# Patient Record
Sex: MALE | Race: WHITE | NOT HISPANIC OR LATINO | Employment: OTHER | URBAN - METROPOLITAN AREA
[De-identification: names, ages, dates, MRNs, and addresses within clinical notes are randomized per-mention and may not be internally consistent; named-entity substitution may affect disease eponyms.]

---

## 2020-11-23 ENCOUNTER — APPOINTMENT (OUTPATIENT)
Dept: GENERAL RADIOLOGY | Facility: HOSPITAL | Age: 60
End: 2020-11-23

## 2020-11-23 ENCOUNTER — APPOINTMENT (OUTPATIENT)
Dept: CT IMAGING | Facility: HOSPITAL | Age: 60
End: 2020-11-23

## 2020-11-23 ENCOUNTER — HOSPITAL ENCOUNTER (EMERGENCY)
Facility: HOSPITAL | Age: 60
Discharge: LEFT BEFORE DISCHARGE | End: 2020-11-23
Attending: EMERGENCY MEDICINE | Admitting: EMERGENCY MEDICINE

## 2020-11-23 VITALS
BODY MASS INDEX: 24.5 KG/M2 | HEIGHT: 71 IN | SYSTOLIC BLOOD PRESSURE: 175 MMHG | DIASTOLIC BLOOD PRESSURE: 100 MMHG | OXYGEN SATURATION: 93 % | RESPIRATION RATE: 16 BRPM | TEMPERATURE: 98 F | HEART RATE: 102 BPM | WEIGHT: 175 LBS

## 2020-11-23 DIAGNOSIS — F10.20 ALCOHOLISM (HCC): ICD-10-CM

## 2020-11-23 DIAGNOSIS — F10.920 ALCOHOLIC INTOXICATION WITHOUT COMPLICATION (HCC): Primary | ICD-10-CM

## 2020-11-23 DIAGNOSIS — R41.3 MEMORY CHANGES: ICD-10-CM

## 2020-11-23 LAB
ALBUMIN SERPL-MCNC: 4.2 G/DL (ref 3.5–5.2)
ALBUMIN/GLOB SERPL: 1.6 G/DL
ALP SERPL-CCNC: 115 U/L (ref 39–117)
ALT SERPL W P-5'-P-CCNC: 46 U/L (ref 1–41)
AMMONIA BLD-SCNC: 18 UMOL/L (ref 16–60)
AMPHET+METHAMPHET UR QL: NEGATIVE
AMPHETAMINES UR QL: NEGATIVE
ANION GAP SERPL CALCULATED.3IONS-SCNC: 17 MMOL/L (ref 5–15)
AST SERPL-CCNC: 80 U/L (ref 1–40)
BARBITURATES UR QL SCN: NEGATIVE
BASOPHILS # BLD AUTO: 0.06 10*3/MM3 (ref 0–0.2)
BASOPHILS NFR BLD AUTO: 0.7 % (ref 0–1.5)
BENZODIAZ UR QL SCN: NEGATIVE
BILIRUB SERPL-MCNC: 0.6 MG/DL (ref 0–1.2)
BILIRUB UR QL STRIP: NEGATIVE
BUN SERPL-MCNC: 5 MG/DL (ref 8–23)
BUN/CREAT SERPL: 8.6 (ref 7–25)
BUPRENORPHINE SERPL-MCNC: NEGATIVE NG/ML
CALCIUM SPEC-SCNC: 9.2 MG/DL (ref 8.6–10.5)
CANNABINOIDS SERPL QL: NEGATIVE
CHLORIDE SERPL-SCNC: 102 MMOL/L (ref 98–107)
CLARITY UR: CLEAR
CO2 SERPL-SCNC: 23 MMOL/L (ref 22–29)
COCAINE UR QL: NEGATIVE
COLOR UR: YELLOW
CREAT SERPL-MCNC: 0.58 MG/DL (ref 0.76–1.27)
D-LACTATE SERPL-SCNC: 2.7 MMOL/L (ref 0.5–2)
DEPRECATED RDW RBC AUTO: 47.8 FL (ref 37–54)
EOSINOPHIL # BLD AUTO: 0.13 10*3/MM3 (ref 0–0.4)
EOSINOPHIL NFR BLD AUTO: 1.5 % (ref 0.3–6.2)
ERYTHROCYTE [DISTWIDTH] IN BLOOD BY AUTOMATED COUNT: 12.4 % (ref 12.3–15.4)
ETHANOL BLD-MCNC: 283 MG/DL (ref 0–10)
GFR SERPL CREATININE-BSD FRML MDRD: 142 ML/MIN/1.73
GLOBULIN UR ELPH-MCNC: 2.7 GM/DL
GLUCOSE SERPL-MCNC: 90 MG/DL (ref 65–99)
GLUCOSE UR STRIP-MCNC: NEGATIVE MG/DL
HCT VFR BLD AUTO: 46.1 % (ref 37.5–51)
HGB BLD-MCNC: 16 G/DL (ref 13–17.7)
HGB UR QL STRIP.AUTO: NEGATIVE
HOLD SPECIMEN: NORMAL
HOLD SPECIMEN: NORMAL
IMM GRANULOCYTES # BLD AUTO: 0.03 10*3/MM3 (ref 0–0.05)
IMM GRANULOCYTES NFR BLD AUTO: 0.3 % (ref 0–0.5)
KETONES UR QL STRIP: NEGATIVE
LACTATE HOLD SPECIMEN: NORMAL
LEUKOCYTE ESTERASE UR QL STRIP.AUTO: NEGATIVE
LYMPHOCYTES # BLD AUTO: 2.02 10*3/MM3 (ref 0.7–3.1)
LYMPHOCYTES NFR BLD AUTO: 23.5 % (ref 19.6–45.3)
MAGNESIUM SERPL-MCNC: 2.1 MG/DL (ref 1.6–2.4)
MCH RBC QN AUTO: 35.9 PG (ref 26.6–33)
MCHC RBC AUTO-ENTMCNC: 34.7 G/DL (ref 31.5–35.7)
MCV RBC AUTO: 103.4 FL (ref 79–97)
METHADONE UR QL SCN: NEGATIVE
MONOCYTES # BLD AUTO: 1 10*3/MM3 (ref 0.1–0.9)
MONOCYTES NFR BLD AUTO: 11.7 % (ref 5–12)
NEUTROPHILS NFR BLD AUTO: 5.34 10*3/MM3 (ref 1.7–7)
NEUTROPHILS NFR BLD AUTO: 62.3 % (ref 42.7–76)
NITRITE UR QL STRIP: NEGATIVE
NRBC BLD AUTO-RTO: 0 /100 WBC (ref 0–0.2)
OPIATES UR QL: NEGATIVE
OXYCODONE UR QL SCN: NEGATIVE
PCP UR QL SCN: NEGATIVE
PH UR STRIP.AUTO: 6.5 [PH] (ref 5–8)
PLATELET # BLD AUTO: 193 10*3/MM3 (ref 140–450)
PMV BLD AUTO: 9.4 FL (ref 6–12)
POTASSIUM SERPL-SCNC: 3.9 MMOL/L (ref 3.5–5.2)
PROCALCITONIN SERPL-MCNC: 0.06 NG/ML (ref 0–0.25)
PROPOXYPH UR QL: NEGATIVE
PROT SERPL-MCNC: 6.9 G/DL (ref 6–8.5)
PROT UR QL STRIP: ABNORMAL
RBC # BLD AUTO: 4.46 10*6/MM3 (ref 4.14–5.8)
SODIUM SERPL-SCNC: 142 MMOL/L (ref 136–145)
SP GR UR STRIP: 1.01 (ref 1–1.03)
TRICYCLICS UR QL SCN: NEGATIVE
TROPONIN T SERPL-MCNC: <0.01 NG/ML (ref 0–0.03)
UROBILINOGEN UR QL STRIP: ABNORMAL
WBC # BLD AUTO: 8.58 10*3/MM3 (ref 3.4–10.8)
WHOLE BLOOD HOLD SPECIMEN: NORMAL
WHOLE BLOOD HOLD SPECIMEN: NORMAL

## 2020-11-23 PROCEDURE — 83735 ASSAY OF MAGNESIUM: CPT | Performed by: EMERGENCY MEDICINE

## 2020-11-23 PROCEDURE — 71045 X-RAY EXAM CHEST 1 VIEW: CPT

## 2020-11-23 PROCEDURE — 84145 PROCALCITONIN (PCT): CPT | Performed by: EMERGENCY MEDICINE

## 2020-11-23 PROCEDURE — 80307 DRUG TEST PRSMV CHEM ANLYZR: CPT | Performed by: EMERGENCY MEDICINE

## 2020-11-23 PROCEDURE — 84484 ASSAY OF TROPONIN QUANT: CPT | Performed by: EMERGENCY MEDICINE

## 2020-11-23 PROCEDURE — 70450 CT HEAD/BRAIN W/O DYE: CPT

## 2020-11-23 PROCEDURE — 85025 COMPLETE CBC W/AUTO DIFF WBC: CPT | Performed by: EMERGENCY MEDICINE

## 2020-11-23 PROCEDURE — 99284 EMERGENCY DEPT VISIT MOD MDM: CPT

## 2020-11-23 PROCEDURE — 80053 COMPREHEN METABOLIC PANEL: CPT | Performed by: EMERGENCY MEDICINE

## 2020-11-23 PROCEDURE — 82140 ASSAY OF AMMONIA: CPT | Performed by: EMERGENCY MEDICINE

## 2020-11-23 PROCEDURE — 93005 ELECTROCARDIOGRAM TRACING: CPT | Performed by: EMERGENCY MEDICINE

## 2020-11-23 PROCEDURE — 81003 URINALYSIS AUTO W/O SCOPE: CPT | Performed by: EMERGENCY MEDICINE

## 2020-11-23 PROCEDURE — 83605 ASSAY OF LACTIC ACID: CPT | Performed by: EMERGENCY MEDICINE

## 2020-11-23 PROCEDURE — 99283 EMERGENCY DEPT VISIT LOW MDM: CPT

## 2020-11-23 RX ORDER — SODIUM CHLORIDE 0.9 % (FLUSH) 0.9 %
10 SYRINGE (ML) INJECTION AS NEEDED
Status: DISCONTINUED | OUTPATIENT
Start: 2020-11-23 | End: 2020-11-23 | Stop reason: HOSPADM

## 2020-11-26 LAB
QT INTERVAL: 332 MS
QTC INTERVAL: 438 MS

## 2023-01-17 ENCOUNTER — OFFICE VISIT (OUTPATIENT)
Dept: FAMILY MEDICINE CLINIC | Facility: CLINIC | Age: 63
End: 2023-01-17
Payer: MEDICAID

## 2023-01-17 ENCOUNTER — LAB (OUTPATIENT)
Dept: LAB | Facility: HOSPITAL | Age: 63
End: 2023-01-17
Payer: MEDICAID

## 2023-01-17 VITALS
DIASTOLIC BLOOD PRESSURE: 90 MMHG | OXYGEN SATURATION: 96 % | WEIGHT: 173 LBS | HEART RATE: 105 BPM | BODY MASS INDEX: 24.22 KG/M2 | SYSTOLIC BLOOD PRESSURE: 146 MMHG | RESPIRATION RATE: 21 BRPM | HEIGHT: 71 IN | TEMPERATURE: 98.6 F

## 2023-01-17 DIAGNOSIS — Z13.1 SCREENING FOR DIABETES MELLITUS: ICD-10-CM

## 2023-01-17 DIAGNOSIS — R20.0 NUMBNESS AND TINGLING IN RIGHT HAND: Primary | ICD-10-CM

## 2023-01-17 DIAGNOSIS — R20.2 NUMBNESS AND TINGLING IN RIGHT HAND: Primary | ICD-10-CM

## 2023-01-17 DIAGNOSIS — Z12.5 SCREENING PSA (PROSTATE SPECIFIC ANTIGEN): ICD-10-CM

## 2023-01-17 DIAGNOSIS — Z13.0 SCREENING FOR DEFICIENCY ANEMIA: ICD-10-CM

## 2023-01-17 DIAGNOSIS — Z13.220 SCREENING, LIPID: ICD-10-CM

## 2023-01-17 DIAGNOSIS — Z13.21 ENCOUNTER FOR VITAMIN DEFICIENCY SCREENING: ICD-10-CM

## 2023-01-17 DIAGNOSIS — Z13.29 SCREENING FOR THYROID DISORDER: ICD-10-CM

## 2023-01-17 DIAGNOSIS — Z78.9 ALCOHOL USE: ICD-10-CM

## 2023-01-17 DIAGNOSIS — J44.9 COPD WITHOUT EXACERBATION: ICD-10-CM

## 2023-01-17 DIAGNOSIS — Z00.00 ENCOUNTER FOR MEDICAL EXAMINATION TO ESTABLISH CARE: ICD-10-CM

## 2023-01-17 DIAGNOSIS — I10 HTN, GOAL BELOW 140/80: ICD-10-CM

## 2023-01-17 DIAGNOSIS — M71.331 OTHER BURSAL CYST OF RIGHT WRIST: ICD-10-CM

## 2023-01-17 LAB
25(OH)D3 SERPL-MCNC: 46.2 NG/ML (ref 30–100)
ALBUMIN SERPL-MCNC: 4.6 G/DL (ref 3.5–5.2)
ALBUMIN/GLOB SERPL: 1.5 G/DL
ALP SERPL-CCNC: 97 U/L (ref 39–117)
ALT SERPL W P-5'-P-CCNC: 17 U/L (ref 1–41)
ANION GAP SERPL CALCULATED.3IONS-SCNC: 14.9 MMOL/L (ref 5–15)
AST SERPL-CCNC: 35 U/L (ref 1–40)
BACTERIA UR QL AUTO: ABNORMAL /HPF
BASOPHILS # BLD AUTO: 0.07 10*3/MM3 (ref 0–0.2)
BASOPHILS NFR BLD AUTO: 0.7 % (ref 0–1.5)
BILIRUB SERPL-MCNC: 0.8 MG/DL (ref 0–1.2)
BILIRUB UR QL STRIP: NEGATIVE
BUN SERPL-MCNC: 6 MG/DL (ref 8–23)
BUN/CREAT SERPL: 7.6 (ref 7–25)
CALCIUM SPEC-SCNC: 10.2 MG/DL (ref 8.6–10.5)
CHLORIDE SERPL-SCNC: 99 MMOL/L (ref 98–107)
CHOLEST SERPL-MCNC: 247 MG/DL (ref 0–200)
CLARITY UR: CLEAR
CO2 SERPL-SCNC: 25.1 MMOL/L (ref 22–29)
COLOR UR: ABNORMAL
CREAT SERPL-MCNC: 0.79 MG/DL (ref 0.76–1.27)
DEPRECATED RDW RBC AUTO: 45.2 FL (ref 37–54)
EGFRCR SERPLBLD CKD-EPI 2021: 99.8 ML/MIN/1.73
EOSINOPHIL # BLD AUTO: 0.1 10*3/MM3 (ref 0–0.4)
EOSINOPHIL NFR BLD AUTO: 1 % (ref 0.3–6.2)
ERYTHROCYTE [DISTWIDTH] IN BLOOD BY AUTOMATED COUNT: 12 % (ref 12.3–15.4)
FOLATE SERPL-MCNC: 14.2 NG/ML (ref 4.78–24.2)
GLOBULIN UR ELPH-MCNC: 3.1 GM/DL
GLUCOSE SERPL-MCNC: 95 MG/DL (ref 65–99)
GLUCOSE UR STRIP-MCNC: NEGATIVE MG/DL
HCT VFR BLD AUTO: 47.4 % (ref 37.5–51)
HDLC SERPL-MCNC: 68 MG/DL (ref 40–60)
HGB BLD-MCNC: 16.6 G/DL (ref 13–17.7)
HGB UR QL STRIP.AUTO: NEGATIVE
HYALINE CASTS UR QL AUTO: ABNORMAL /LPF
IMM GRANULOCYTES # BLD AUTO: 0.04 10*3/MM3 (ref 0–0.05)
IMM GRANULOCYTES NFR BLD AUTO: 0.4 % (ref 0–0.5)
KETONES UR QL STRIP: ABNORMAL
LDLC SERPL CALC-MCNC: 165 MG/DL (ref 0–100)
LDLC/HDLC SERPL: 2.39 {RATIO}
LEUKOCYTE ESTERASE UR QL STRIP.AUTO: ABNORMAL
LYMPHOCYTES # BLD AUTO: 2.07 10*3/MM3 (ref 0.7–3.1)
LYMPHOCYTES NFR BLD AUTO: 19.9 % (ref 19.6–45.3)
MCH RBC QN AUTO: 35.7 PG (ref 26.6–33)
MCHC RBC AUTO-ENTMCNC: 35 G/DL (ref 31.5–35.7)
MCV RBC AUTO: 101.9 FL (ref 79–97)
MONOCYTES # BLD AUTO: 1.25 10*3/MM3 (ref 0.1–0.9)
MONOCYTES NFR BLD AUTO: 12 % (ref 5–12)
MUCOUS THREADS URNS QL MICRO: ABNORMAL /HPF
NEUTROPHILS NFR BLD AUTO: 6.85 10*3/MM3 (ref 1.7–7)
NEUTROPHILS NFR BLD AUTO: 66 % (ref 42.7–76)
NITRITE UR QL STRIP: NEGATIVE
NRBC BLD AUTO-RTO: 0 /100 WBC (ref 0–0.2)
PH UR STRIP.AUTO: 7 [PH] (ref 5–8)
PLATELET # BLD AUTO: 343 10*3/MM3 (ref 140–450)
PMV BLD AUTO: 10.3 FL (ref 6–12)
POTASSIUM SERPL-SCNC: 4 MMOL/L (ref 3.5–5.2)
PROT SERPL-MCNC: 7.7 G/DL (ref 6–8.5)
PROT UR QL STRIP: ABNORMAL
PSA SERPL-MCNC: 0.95 NG/ML (ref 0–4)
RBC # BLD AUTO: 4.65 10*6/MM3 (ref 4.14–5.8)
RBC # UR STRIP: ABNORMAL /HPF
REF LAB TEST METHOD: ABNORMAL
SODIUM SERPL-SCNC: 139 MMOL/L (ref 136–145)
SP GR UR STRIP: 1.02 (ref 1–1.03)
SQUAMOUS #/AREA URNS HPF: ABNORMAL /HPF
TRIGL SERPL-MCNC: 83 MG/DL (ref 0–150)
TSH SERPL DL<=0.05 MIU/L-ACNC: 0.54 UIU/ML (ref 0.27–4.2)
UROBILINOGEN UR QL STRIP: ABNORMAL
VIT B12 BLD-MCNC: 666 PG/ML (ref 211–946)
VLDLC SERPL-MCNC: 14 MG/DL (ref 5–40)
WBC # UR STRIP: ABNORMAL /HPF
WBC NRBC COR # BLD: 10.38 10*3/MM3 (ref 3.4–10.8)

## 2023-01-17 PROCEDURE — 80061 LIPID PANEL: CPT

## 2023-01-17 PROCEDURE — 81001 URINALYSIS AUTO W/SCOPE: CPT

## 2023-01-17 PROCEDURE — 80053 COMPREHEN METABOLIC PANEL: CPT

## 2023-01-17 PROCEDURE — 83036 HEMOGLOBIN GLYCOSYLATED A1C: CPT

## 2023-01-17 PROCEDURE — 84443 ASSAY THYROID STIM HORMONE: CPT | Performed by: NURSE PRACTITIONER

## 2023-01-17 PROCEDURE — 85025 COMPLETE CBC W/AUTO DIFF WBC: CPT

## 2023-01-17 PROCEDURE — 36415 COLL VENOUS BLD VENIPUNCTURE: CPT

## 2023-01-17 PROCEDURE — 82306 VITAMIN D 25 HYDROXY: CPT

## 2023-01-17 PROCEDURE — 82607 VITAMIN B-12: CPT

## 2023-01-17 PROCEDURE — 99203 OFFICE O/P NEW LOW 30 MIN: CPT | Performed by: NURSE PRACTITIONER

## 2023-01-17 PROCEDURE — 82746 ASSAY OF FOLIC ACID SERUM: CPT

## 2023-01-17 PROCEDURE — G0103 PSA SCREENING: HCPCS

## 2023-01-17 RX ORDER — ALBUTEROL SULFATE 90 UG/1
2 AEROSOL, METERED RESPIRATORY (INHALATION) EVERY 4 HOURS PRN
Qty: 18 G | Refills: 5 | Status: SHIPPED | OUTPATIENT
Start: 2023-01-17

## 2023-01-17 RX ORDER — MULTIPLE VITAMINS W/ MINERALS TAB 9MG-400MCG
1 TAB ORAL DAILY
COMMUNITY

## 2023-01-17 RX ORDER — FLUTICASONE PROPIONATE AND SALMETEROL 250; 50 UG/1; UG/1
1 POWDER RESPIRATORY (INHALATION)
Qty: 1 EACH | Refills: 11 | Status: SHIPPED | OUTPATIENT
Start: 2023-01-17

## 2023-01-17 RX ORDER — LISINOPRIL 10 MG/1
10 TABLET ORAL DAILY
Qty: 90 TABLET | Refills: 1 | Status: SHIPPED | OUTPATIENT
Start: 2023-01-17

## 2023-01-17 NOTE — PROGRESS NOTES
"Chief Complaint  Establish Care and Hand Pain (Pt has been having right hand pain and numbness.  Pt had carpal tunnel surgery on left hand. )    Subjective          Oscar Avila presents to [facility]  History of Present Illness  Patient is a 63 yo male. He is here to establish care with a new provider. He has seen PCP at [facility] and does not recall the name. They no longer take his insurance.  He is retired on social security.   He states a history of COPD, alcohol use, and hand pain right hand pain and numbness and tingling that started several months ago seems to be worsening.  He does have a history of carpal tunnel surgery on his left hand some years ago.      The following portions of the patient's history were reviewed and updated as appropriate: allergies, current medications, past family history, past medical history, past social history, past surgical history and problem list.    Review of Systems   Constitutional: Negative.    Respiratory: Positive for cough and chest tightness.    Cardiovascular: Negative.    Gastrointestinal: Negative.    Genitourinary: Negative.    Musculoskeletal: Negative.    Skin: Negative.    Neurological: Positive for numbness.   Hematological: Negative.    Psychiatric/Behavioral: Negative for suicidal ideas. The patient is nervous/anxious.          Objective   Vital Signs:   /90   Pulse 105   Temp 98.6 °F (37 °C) (Temporal)   Resp 21   Ht 180.3 cm (70.98\")   Wt 78.5 kg (173 lb)   SpO2 96%   BMI 24.14 kg/m²    BMI is within normal parameters. No other follow-up for BMI required.      PHQ-2/9 Depression Screening  PHQ-9 Total Score: 0    SAMUEL-7 Anxiety Screening  SAMUEL-7  Feeling nervous, anxious or on edge: Not at all  Not being able to stop or control worrying: Several days  Worrying too much about different things: Several days  Trouble Relaxing: Not at all  Being so restless that it is hard to sit still: Not at " all  Feeling afraid as if something awful might happen: Not at all  Becoming easily annoyed or irritable: Several days  SAMUEL 7 Total Score: 3  If you checked any problems, how difficult have these problems made it for you to do your work, take care of things at home, or get along with other people: Somewhat difficult          Physical Exam  Vitals ( blood pressure is 146/90 and his pulse rate is 105 bpm   today is his first day with this provider.  He is not currently on any blood pressure medication. he does state a hx of HTN untreated.) reviewed.   Constitutional:       Appearance: He is normal weight.   HENT:      Head: Normocephalic.      Right Ear: Tympanic membrane normal.      Mouth/Throat:      Mouth: Mucous membranes are moist.   Eyes:      Pupils: Pupils are equal, round, and reactive to light.   Cardiovascular:      Rate and Rhythm: Regular rhythm. Tachycardia present.      Pulses: Normal pulses.   Pulmonary:      Effort: Pulmonary effort is normal.      Breath sounds: Normal breath sounds.   Abdominal:      Palpations: Abdomen is soft.   Musculoskeletal:         General: No swelling, deformity or signs of injury.      Right hand: Tenderness present. No swelling, deformity, lacerations or bony tenderness. Normal range of motion. Normal strength. Normal sensation. There is no disruption of two-point discrimination. Normal capillary refill. Normal pulse.        Arms:       Right lower leg: No edema.      Left lower leg: No edema.      Comments: There is a very small cyst type lesion on his ulnar side of his wrist. This may be the cause of his tingling/ numbness.      Skin:     General: Skin is warm and dry.      Capillary Refill: Capillary refill takes less than 2 seconds.   Neurological:      Mental Status: He is alert and oriented to person, place, and time.   Psychiatric:         Mood and Affect: Mood normal.        Result Review :                 Assessment and Plan    Diagnoses and all orders for this  visit:    1. Numbness and tingling in right hand (Primary)  -     Ambulatory Referral to Orthopedic Surgery    2. Encounter for medical examination to establish care  -     CBC & Differential; Future  -     Lipid Panel; Future  -     Hemoglobin A1c; Future  -     Comprehensive Metabolic Panel; Future  -     Urinalysis With Culture If Indicated - Urine, Clean Catch; Future    3. Screening, lipid  -     Lipid Panel; Future    4. Screening for diabetes mellitus  -     Hemoglobin A1c; Future    5. Screening for deficiency anemia  -     CBC & Differential; Future    6. Screening for thyroid disorder  -     TSH Rfx On Abnormal To Free T4    7. Screening PSA (prostate specific antigen)  -     PSA Screen; Future    8. Encounter for vitamin deficiency screening  -     Vitamin D,25-Hydroxy; Future  -     Vitamin B12; Future  -     Folate; Future    9. Alcohol use  -     Vitamin B12; Future  -     Folate; Future    10. HTN, goal below 140/80  -     lisinopril (PRINIVIL,ZESTRIL) 10 MG tablet; Take 1 tablet by mouth Daily.  Dispense: 90 tablet; Refill: 1    11. COPD without exacerbation (HCC)  -     albuterol sulfate  (90 Base) MCG/ACT inhaler; Inhale 2 puffs Every 4 (Four) Hours As Needed for Wheezing or Shortness of Air.  Dispense: 18 g; Refill: 5  -     Fluticasone-Salmeterol (ADVAIR/WIXELA) 250-50 MCG/ACT DISKUS; Inhale 1 puff 2 (Two) Times a Day.  Dispense: 1 each; Refill: 11    12. Other bursal cyst of right wrist    referral to see orthopedics for right hand numbness tingling and cyst.   Follow up for labs.   Refilling his albuterol, advair.  Starting on lisinopril for his HTN. He had several documented elevated pressures in his past.   Follow up in 4 weeks for HTN and annual exam.       Follow Up   Return needs appointment for physical in 4 weeks, for Annual.  Patient was given instructions and counseling regarding his condition or for health maintenance advice. Please see specific information pulled into the AVS  if appropriate.

## 2023-01-18 LAB — HBA1C MFR BLD: 5.4 % (ref 4.8–5.6)

## 2023-01-18 NOTE — PROGRESS NOTES
Please call patient regarding his urinalysis shows some protein and is dark.   Increase water intake will recheck at next visit. Otherwise, there are a few minor non concerning abnormalities.

## 2023-01-18 NOTE — PROGRESS NOTES
Let patient know his labs are normal.  His PSA/prostate level is normal, his liver function, kidney function, electrolytes are normal.  His folate, B12, and vitamin D are normal.     His cholesterol is elevated. Your cholesterol was elevated. You will need to avoid fat and fried foods.   Increase your intake of fiber. Increase your exercise to 30 minutes 3-4 times a week.

## 2023-01-19 ENCOUNTER — OFFICE VISIT (OUTPATIENT)
Dept: ORTHOPEDIC SURGERY | Facility: CLINIC | Age: 63
End: 2023-01-19
Payer: MEDICAID

## 2023-01-19 VITALS
SYSTOLIC BLOOD PRESSURE: 130 MMHG | HEIGHT: 71 IN | WEIGHT: 173.06 LBS | BODY MASS INDEX: 24.23 KG/M2 | DIASTOLIC BLOOD PRESSURE: 82 MMHG

## 2023-01-19 DIAGNOSIS — G56.01 CARPAL TUNNEL SYNDROME ON RIGHT: ICD-10-CM

## 2023-01-19 DIAGNOSIS — R20.2 NUMBNESS AND TINGLING IN RIGHT HAND: Primary | ICD-10-CM

## 2023-01-19 DIAGNOSIS — M79.641 RIGHT HAND PAIN: ICD-10-CM

## 2023-01-19 DIAGNOSIS — R20.0 NUMBNESS AND TINGLING IN RIGHT HAND: Primary | ICD-10-CM

## 2023-01-19 PROCEDURE — 73130 X-RAY EXAM OF HAND: CPT | Performed by: ORTHOPAEDIC SURGERY

## 2023-01-19 PROCEDURE — 99203 OFFICE O/P NEW LOW 30 MIN: CPT

## 2023-01-19 NOTE — PROGRESS NOTES
[facility] [place] Office Visit - Najma Mojica PA-C    Office Visit       Patient Name: Oscar Avila    Chief Complaint:   Chief Complaint   Patient presents with   • Right Hand - Pain       Referring Physician: Aicha Maldonado APRN    History of Present Illness:   Oscar Avila is a 62 y.o. male who presents with numbness and tingling in his right hand and fingers.  He reports for the last several years he has had numbness in his fingers.  It has progressively worsened recently.  Associated with poor  strength.  He says he often drops objects and cups due to poor sensation.  Associated with mild pain.   Described as dull and aching.  Treatments tried include heat and activity modification. He is right-hand dominant.  History of left carpal tunnel release many years ago at [facility].  No history of diabetes.  Normal vitamin B12 and folate levels checked by PCP.      Subjective     Review of Systems   Constitutional: Negative.  Negative for chills, fatigue and fever.   HENT: Negative.  Negative for congestion and dental problem.    Eyes: Negative.  Negative for blurred vision.   Respiratory: Negative.  Negative for shortness of breath.    Cardiovascular: Negative.  Negative for leg swelling.   Gastrointestinal: Negative.  Negative for abdominal pain.   Endocrine: Negative.  Negative for polyuria.   Genitourinary: Negative.  Negative for difficulty urinating.   Musculoskeletal: Positive for arthralgias.   Skin: Negative.    Allergic/Immunologic: Negative.    Neurological: Negative.    Hematological: Negative.  Negative for adenopathy.   Psychiatric/Behavioral: Negative.  Negative for behavioral problems.        Past Medical History:   Past Medical History:   Diagnosis Date   • Hypertension    • Neuromuscular disorder (HCC)        Past Surgical History:   Past Surgical History:   Procedure Laterality Date   • CARPAL TUNNEL  "RELEASE Left    • FOOT FRACTURE SURGERY Right 01/01/2018    fracture to right foot - has a metal pin on great toe       Family History:   Family History   Problem Relation Age of Onset   • Cancer Mother         small cell cancer   • Cancer Father         colon cancer       Social History:   Social History     Socioeconomic History   • Marital status:    Tobacco Use   • Smoking status: Every Day     Packs/day: 1.50     Years: 45.00     Pack years: 67.50     Types: Cigarettes     Start date: 1976   • Smokeless tobacco: Never   Vaping Use   • Vaping Use: Never used   Substance and Sexual Activity   • Alcohol use: Yes     Comment: drinks vodka - 4 pints per week.   • Drug use: Yes     Comment: alcohol   • Sexual activity: Yes     Partners: Female       Medications:   Current Outpatient Medications:   •  albuterol sulfate  (90 Base) MCG/ACT inhaler, Inhale 2 puffs Every 4 (Four) Hours As Needed for Wheezing or Shortness of Air., Disp: 18 g, Rfl: 5  •  Fluticasone-Salmeterol (ADVAIR/WIXELA) 250-50 MCG/ACT DISKUS, Inhale 1 puff 2 (Two) Times a Day., Disp: 1 each, Rfl: 11  •  lisinopril (PRINIVIL,ZESTRIL) 10 MG tablet, Take 1 tablet by mouth Daily., Disp: 90 tablet, Rfl: 1  •  multivitamin with minerals tablet tablet, Take 1 tablet by mouth Daily., Disp: , Rfl:     Allergies:   Allergies   Allergen Reactions   • Penicillins GI Intolerance     \" I VOMIT\"        I have reviewed and updated the following portions of the patient's history and review of systems: allergies, current medications, past family history, past medical history, past social history, past surgical history and problem list.    Objective      Vital Signs:   Vitals:    01/19/23 0755   BP: 130/82   Weight: 78.5 kg (173 lb 1 oz)   Height: 180.3 cm (70.98\")       Ortho Exam:  HAND AND WRIST EXAM    SIDE: Right    Peripheral Vascular   Bilateral Upper Extremity    No cyanotic nail beds    Pink nail beds and rapid capillary " refill   Palpation    Radial Pulse - Bilaterally normal    Neurologic   Sensory: Light touch intact, reports numbness of fingertips      Upper Extremity    Wrist extensors: 5/5    Wrist flexors: 5/5    Intrinsics: 5/5    Musculoskeletal      Elbow    Forearm supination: AROM - 90 degrees    Forearm pronation: AROM - 90 degrees     Inspection and Palpation   Wrist      Tenderness -none    Inspection- small volar cyst on wrist, <1 cm    Swelling - none    Crepitus - none    Muscle tone - no atrophy        ROJM:     Wrist    Flexion: AROM - 90 degrees    Extension: AROM - 90 degrees     Deformities, Malalignments, Discrepancies    None     Functional Testing   Wrist    Tinel's Sign--positive    Phalen's Sign--positive       Strength and Tone     strength: weakened     Results Review:   XR Hand 3+ View Right  Imaging: HAND x-rays 3 views     Side: RIGHT hand    Indication for images: hand pain, possible carpal tunnel syndrome    Comparison: no comparison views available    Findings:   Right hand baseline degenerative changes including basilar thumb arthritic   changes, no acute bony findings.    I personally reviewed the above x-rays         Assessment / Plan      Assessment:  Diagnoses and all orders for this visit:    1. Numbness and tingling in right hand (Primary)  -     EMG & Nerve Conduction Test    2. Carpal tunnel syndrome on right  -     EMG & Nerve Conduction Test    3. Right hand pain  -     Cancel: XR Hand 3+ View Right  -     EMG & Nerve Conduction Test        Plan:  1. Fitted for right cock-up wrist splint to wear nightly.  2. Ordered EMG testing for right upper extremity to evaluate median nerve function.      Follow Up:   After EMG study        Najma Mojica PA-C  [facility] [place] [place] [facility]       Dictated using Dragon Speech Recognition.

## 2023-01-20 ENCOUNTER — PRIOR AUTHORIZATION (OUTPATIENT)
Dept: FAMILY MEDICINE CLINIC | Facility: CLINIC | Age: 63
End: 2023-01-20
Payer: MEDICAID

## 2023-01-31 ENCOUNTER — TELEPHONE (OUTPATIENT)
Dept: ORTHOPEDIC SURGERY | Facility: CLINIC | Age: 63
End: 2023-01-31

## 2023-01-31 NOTE — TELEPHONE ENCOUNTER
Provider: GLENNY HARMON PA-C  Caller: PRAMOD ADAM   Relationship to Patient:PATIENT  Phone Number: [phone], [facility]   Reason for Call: EMG COULD NOT BE SCHEDULED UNTIL 03/24/23 FIRST AVAILABLE   When was the patient last seen:01/19/23    Characteristics of symptom/severity: PATIENT SAYS HE CAN'T HOLD ANYTHING IN HIS RIGHT HAND   Timing- Is it constant or intermittent: INTERMITTENT , BUT MOST OF THE TIME WHEN HE TRIES TO HOLD ANYTHING HE DROPS.    PATIENT WOULD LIKE TO KNOW IF ANY OTHER OPTIONS? CAN A STAT REQUEST FOR EMG BE PUT IN? PLEASE CALL TO DISCUSS.     PATIENT IS RIGHT HANDED AND HE HAS PROBLEMS WRITING .

## 2023-02-15 ENCOUNTER — OFFICE VISIT (OUTPATIENT)
Dept: FAMILY MEDICINE CLINIC | Facility: CLINIC | Age: 63
End: 2023-02-15
Payer: MEDICAID

## 2023-02-15 VITALS
WEIGHT: 169 LBS | OXYGEN SATURATION: 96 % | BODY MASS INDEX: 23.66 KG/M2 | RESPIRATION RATE: 21 BRPM | TEMPERATURE: 98.6 F | HEART RATE: 116 BPM | SYSTOLIC BLOOD PRESSURE: 126 MMHG | HEIGHT: 71 IN | DIASTOLIC BLOOD PRESSURE: 62 MMHG

## 2023-02-15 DIAGNOSIS — D22.9 MULTIPLE ATYPICAL NEVI: ICD-10-CM

## 2023-02-15 DIAGNOSIS — Z12.2 ENCOUNTER FOR SCREENING FOR LUNG CANCER: ICD-10-CM

## 2023-02-15 DIAGNOSIS — J44.9 COPD WITHOUT EXACERBATION: ICD-10-CM

## 2023-02-15 DIAGNOSIS — F17.210 NICOTINE DEPENDENCE, CIGARETTES, UNCOMPLICATED: ICD-10-CM

## 2023-02-15 DIAGNOSIS — Z00.00 ENCOUNTER FOR ANNUAL PHYSICAL EXAM: Primary | ICD-10-CM

## 2023-02-15 DIAGNOSIS — Z71.6 ENCOUNTER FOR SMOKING CESSATION COUNSELING: ICD-10-CM

## 2023-02-15 PROCEDURE — G0296 VISIT TO DETERM LDCT ELIG: HCPCS | Performed by: NURSE PRACTITIONER

## 2023-02-15 RX ORDER — VARENICLINE TARTRATE 1 MG/1
1 TABLET, FILM COATED ORAL 2 TIMES DAILY
Qty: 56 TABLET | Refills: 4 | Status: SHIPPED | OUTPATIENT
Start: 2023-03-15 | End: 2023-03-15 | Stop reason: SDUPTHER

## 2023-02-15 RX ORDER — VARENICLINE TARTRATE 25 MG
KIT ORAL
Qty: 53 TABLET | Refills: 0 | Status: SHIPPED | OUTPATIENT
Start: 2023-02-15 | End: 2023-03-15

## 2023-02-15 RX ORDER — VARENICLINE TARTRATE 1 MG/1
1 TABLET, FILM COATED ORAL 2 TIMES DAILY
Status: CANCELLED | OUTPATIENT
Start: 2023-02-15

## 2023-02-16 ENCOUNTER — OFFICE VISIT (OUTPATIENT)
Dept: ORTHOPEDIC SURGERY | Facility: CLINIC | Age: 63
End: 2023-02-16
Payer: MEDICAID

## 2023-02-16 VITALS
DIASTOLIC BLOOD PRESSURE: 72 MMHG | SYSTOLIC BLOOD PRESSURE: 120 MMHG | WEIGHT: 169.09 LBS | BODY MASS INDEX: 23.67 KG/M2 | HEIGHT: 71 IN

## 2023-02-16 DIAGNOSIS — R20.2 NUMBNESS AND TINGLING IN RIGHT HAND: ICD-10-CM

## 2023-02-16 DIAGNOSIS — G56.01 CARPAL TUNNEL SYNDROME ON RIGHT: Primary | ICD-10-CM

## 2023-02-16 DIAGNOSIS — R20.0 NUMBNESS AND TINGLING IN RIGHT HAND: ICD-10-CM

## 2023-02-16 PROCEDURE — 20526 THER INJECTION CARP TUNNEL: CPT

## 2023-02-16 PROCEDURE — 99213 OFFICE O/P EST LOW 20 MIN: CPT

## 2023-02-16 RX ORDER — TRIAMCINOLONE ACETONIDE 40 MG/ML
40 INJECTION, SUSPENSION INTRA-ARTICULAR; INTRAMUSCULAR
Status: COMPLETED | OUTPATIENT
Start: 2023-02-16 | End: 2023-02-16

## 2023-02-16 RX ORDER — LIDOCAINE HYDROCHLORIDE 10 MG/ML
1 INJECTION, SOLUTION EPIDURAL; INFILTRATION; INTRACAUDAL; PERINEURAL
Status: COMPLETED | OUTPATIENT
Start: 2023-02-16 | End: 2023-02-16

## 2023-02-16 RX ADMIN — TRIAMCINOLONE ACETONIDE 40 MG: 40 INJECTION, SUSPENSION INTRA-ARTICULAR; INTRAMUSCULAR at 11:47

## 2023-02-16 RX ADMIN — LIDOCAINE HYDROCHLORIDE 1 ML: 10 INJECTION, SOLUTION EPIDURAL; INFILTRATION; INTRACAUDAL; PERINEURAL at 11:47

## 2023-02-16 NOTE — PROGRESS NOTES
Procedure   - Hand/Upper Extremity Injection: R carpal tunnel for carpal tunnel syndrome on 2/16/2023 11:47 AM  Indications: pain  Details: 25 G needle, volar approach  Medications: 1 mL lidocaine PF 1% 1 %; 40 mg triamcinolone acetonide 40 MG/ML  Outcome: tolerated well, no immediate complications  Consent was given by the patient. Immediately prior to procedure a time out was called to verify the correct patient, procedure, equipment, support staff and site/side marked as required. Patient was prepped and draped in the usual sterile fashion.

## 2023-02-16 NOTE — PROGRESS NOTES
[facility] Office Follow Up       Office Follow Up Visit       Patient Name: Oscar Avila    Chief Complaint:   Chief Complaint   Patient presents with   • Follow-up     4 week EMG follow up - Numbness and tingling in right hand        Referring Physician: No ref. provider found    History of Present Illness:   Oscar Avila returns to clinic today for numbness and tingling of right hand.  Last visit 1/19/2023.  EMG testing ordered at that time.  He went to [place] to have EMG done quicker.  He was told he had severe carpal tunnel symptoms of right hand.  He says symptoms are unchanged since last visit.      Subjective     Review of Systems   Musculoskeletal: Positive for arthralgias.   All other systems reviewed and are negative.       I have reviewed and updated the following portions of the patient's history and review of systems: allergies, current medications, past family history, past medical history, past social history, past surgical history and problem list.    Medications:   Current Outpatient Medications:   •  albuterol sulfate  (90 Base) MCG/ACT inhaler, Inhale 2 puffs Every 4 (Four) Hours As Needed for Wheezing or Shortness of Air., Disp: 18 g, Rfl: 5  •  Fluticasone-Salmeterol (ADVAIR/WIXELA) 250-50 MCG/ACT DISKUS, Inhale 1 puff 2 (Two) Times a Day., Disp: 1 each, Rfl: 11  •  lisinopril (PRINIVIL,ZESTRIL) 10 MG tablet, Take 1 tablet by mouth Daily., Disp: 90 tablet, Rfl: 1  •  multivitamin with minerals tablet tablet, Take 1 tablet by mouth Daily., Disp: , Rfl:   •  [START ON 3/15/2023] varenicline (CHANTIX) 1 MG tablet, Take 1 tablet by mouth 2 (Two) Times a Day for 140 days., Disp: 56 tablet, Rfl: 4  •  Varenicline Tartrate 0.5 MG X 11 & 1 MG X 42 tablet, Take 0.5 mg one daily on days 1-3 and 0.5 mg twice daily on days 4-7. Then 1 mg twice daily for a total of 12 weeks., Disp: 53 tablet, Rfl: 0    Allergies:  "  Allergies   Allergen Reactions   • Penicillins GI Intolerance     \" I VOMIT\"          Objective      Vital Signs:   Vitals:    02/16/23 1120   BP: 120/72   Weight: 76.7 kg (169 lb 1.5 oz)   Height: 180.3 cm (70.98\")       Ortho Exam:  General: no acute distress, comfortable  Vitals reviewed in chart    Musculoskeletal Exam:    SIDE: Right hand    Tenderness: None    Range of motion measurements (degrees): Normal flexion/extension of wrist  Painful arc of motion: No  Reports numb sensation of first 3 digits at all times  Neurovascularly intact  Good capillary refill  Good Radial pulse  No evidence of septic joint      Results Review:  XR Hand 3+ View Right  Imaging: HAND x-rays 3 views     Side: RIGHT hand    Indication for images: hand pain, possible carpal tunnel syndrome    Comparison: no comparison views available    Findings:   Right hand baseline degenerative changes including basilar thumb arthritic   changes, no acute bony findings.    I personally reviewed the above x-rays       Assessment / Plan      Assessment:   Diagnoses and all orders for this visit:    1. Carpal tunnel syndrome on right (Primary)    2. Numbness and tingling in right hand    Other orders  -     - Hand/Upper Extremity Injection: R carpal tunnel        Reviewed EMG report showing severe slowing of median nerve on right side at the wrist.  Motor latency 7.7 at wrist.    Plan:  1. We will proceed with carpal tunnel injection of right side despite severe phobia of needles.  This will give us a good idea of what outcome can be expected from surgery.  He does smoke 1-1/2 packs of cigarettes a day.  Advised he would need to cut this way back to be considered for surgery.  He says he is recently been put on Chantix by his primary care provider and is trying to cut back.  2. May continue with over-the-counter anti-inflammatories as needed for pain and swelling.  3. We can consider carpal tunnel release at next visit if he has cut down from " smoking.    Follow Up:   Return in about 3 months (around 5/16/2023).        Najma Mojica PA-C  [facility] [place] [facility]    Dictated using Dragon Speech Recognition.

## 2023-03-15 ENCOUNTER — OFFICE VISIT (OUTPATIENT)
Dept: FAMILY MEDICINE CLINIC | Facility: CLINIC | Age: 63
End: 2023-03-15
Payer: MEDICAID

## 2023-03-15 VITALS
BODY MASS INDEX: 23.21 KG/M2 | DIASTOLIC BLOOD PRESSURE: 60 MMHG | SYSTOLIC BLOOD PRESSURE: 102 MMHG | OXYGEN SATURATION: 97 % | WEIGHT: 165.8 LBS | TEMPERATURE: 98 F | HEIGHT: 71 IN | RESPIRATION RATE: 19 BRPM | HEART RATE: 83 BPM

## 2023-03-15 DIAGNOSIS — Z71.6 ENCOUNTER FOR SMOKING CESSATION COUNSELING: ICD-10-CM

## 2023-03-15 DIAGNOSIS — Z23 IMMUNIZATION DUE: ICD-10-CM

## 2023-03-15 DIAGNOSIS — E78.5 HYPERLIPIDEMIA, UNSPECIFIED HYPERLIPIDEMIA TYPE: ICD-10-CM

## 2023-03-15 DIAGNOSIS — J44.9 COPD WITHOUT EXACERBATION: ICD-10-CM

## 2023-03-15 DIAGNOSIS — F17.210 NICOTINE DEPENDENCE, CIGARETTES, UNCOMPLICATED: Primary | ICD-10-CM

## 2023-03-15 PROBLEM — U07.1 COVID-19: Status: ACTIVE | Noted: 2023-03-15

## 2023-03-15 PROCEDURE — 90677 PCV20 VACCINE IM: CPT | Performed by: NURSE PRACTITIONER

## 2023-03-15 PROCEDURE — 99214 OFFICE O/P EST MOD 30 MIN: CPT | Performed by: NURSE PRACTITIONER

## 2023-03-15 PROCEDURE — 90471 IMMUNIZATION ADMIN: CPT | Performed by: NURSE PRACTITIONER

## 2023-03-15 RX ORDER — ATORVASTATIN CALCIUM 10 MG/1
10 TABLET, FILM COATED ORAL NIGHTLY
Qty: 30 TABLET | Refills: 3 | Status: SHIPPED | OUTPATIENT
Start: 2023-03-15

## 2023-03-15 RX ORDER — VARENICLINE TARTRATE 1 MG/1
1 TABLET, FILM COATED ORAL 2 TIMES DAILY
Qty: 60 TABLET | Refills: 2 | Status: SHIPPED | OUTPATIENT
Start: 2023-03-15

## 2023-03-15 RX ORDER — ATORVASTATIN CALCIUM 10 MG/1
10 TABLET, FILM COATED ORAL DAILY
Qty: 30 TABLET | Refills: 3 | Status: SHIPPED | OUTPATIENT
Start: 2023-03-15 | End: 2023-03-15

## 2023-03-15 NOTE — PROGRESS NOTES
"Chief Complaint  COPD (1 mo f/u)    Subjective          Oscar Avila presents to [facility]  History of Present Illness  Patient is a 62-year-old male.  He is here for follow-up on hypertension, COPD, and smoking cessation.  He states he is tolerating the Chantix without any issues.  He would like to continue it another month.  His COPD reports his shortness of air has improved since starting the Advair inhaler.  His blood pressure is currently under control is 102/60.  Heart rate 83.  Patient does report feeling better.  He denies any chest pain, shortness of breath, or leg swelling.  He does have a low-dose CT scan ordered for tomorrow screening.  Patient states he recently had COVID 3 weeks ago.  States he did not well.  Did not require hospitalization.    Review labs.  Patient's cholesterol level is high.  Discussed diet.  Low-fat, low-cholesterol, increase fiber, increase exercise.  He is agreeable to start on atorvastatin nightly.  Follow-up in 6 months for recheck.         The following portions of the patient's history were reviewed and updated as appropriate: allergies, current medications, past family history, past medical history, past social history, past surgical history and problem list.    Review of Systems   Constitutional: Negative.    HENT: Negative.    Respiratory: Positive for cough and wheezing.         COPD   Cardiovascular: Negative.    Gastrointestinal: Negative.    Genitourinary: Negative.    Musculoskeletal: Negative.    Skin: Positive for rash.        Eczema   Allergic/Immunologic: Positive for environmental allergies.   Neurological: Negative.    Hematological: Negative.    Psychiatric/Behavioral: Positive for sleep disturbance.         Objective   Vital Signs:   /60   Pulse 83   Temp 98 °F (36.7 °C) (Temporal)   Resp 19   Ht 180.3 cm (70.98\")   Wt 75.2 kg (165 lb 12.8 oz)   SpO2 97%   BMI 23.13 kg/m²    BMI is within normal parameters. " No other follow-up for BMI required.              Physical Exam  Vitals reviewed.   Constitutional:       Appearance: He is well-developed. He is not ill-appearing.   HENT:      Head: Normocephalic.   Eyes:      Conjunctiva/sclera: Conjunctivae normal.      Pupils: Pupils are equal, round, and reactive to light.   Cardiovascular:      Rate and Rhythm: Normal rate and regular rhythm.      Heart sounds: Normal heart sounds.   Pulmonary:      Effort: Pulmonary effort is normal.      Breath sounds: Normal breath sounds.   Abdominal:      General: Bowel sounds are normal.      Palpations: Abdomen is soft.   Musculoskeletal:      Cervical back: Normal range of motion.   Lymphadenopathy:      Cervical: No cervical adenopathy.   Skin:     General: Skin is warm and dry.      Capillary Refill: Capillary refill takes less than 2 seconds.      Comments: Eczema left face. Continue using current OTC medication   Neurological:      Mental Status: He is alert and oriented to person, place, and time.   Psychiatric:         Mood and Affect: Mood normal.         Speech: Speech normal.         Behavior: Behavior normal. Behavior is cooperative.         Thought Content: Thought content normal.         Judgment: Judgment normal.        Result Review :            Urinalysis, Microscopic Only - Urine, Clean Catch (01/17/2023 11:05)  TSH Rfx On Abnormal To Free T4 (01/17/2023 11:05)  CBC & Differential (01/17/2023 11:05)  Lipid Panel (01/17/2023 11:05)  Hemoglobin A1c (01/17/2023 11:05)  Comprehensive Metabolic Panel (01/17/2023 11:05)  Urinalysis With Culture If Indicated - Urine, Clean Catch (01/17/2023 11:05)  Vitamin D,25-Hydroxy (01/17/2023 11:05)  TSH Rfx On Abnormal To Free T4 (01/17/2023 11:05)  PSA Screen (01/17/2023 11:05)  Vitamin B12 (01/17/2023 11:05)  Folate (01/17/2023 11:05)  Urinalysis, Microscopic Only - Urine, Clean Catch (01/17/2023 11:05)       Assessment and Plan    Diagnoses and all orders for this visit:    1.  Nicotine dependence, cigarettes, uncomplicated (Primary)    2. Encounter for smoking cessation counseling  -     varenicline (CHANTIX) 1 MG tablet; Take 1 tablet by mouth 2 (Two) Times a Day.  Dispense: 60 tablet; Refill: 2    3. Hyperlipidemia, unspecified hyperlipidemia type  -     Discontinue: atorvastatin (LIPITOR) 10 MG tablet; Take 1 tablet by mouth Daily.  Dispense: 30 tablet; Refill: 3  -     atorvastatin (LIPITOR) 10 MG tablet; Take 1 tablet by mouth Every Night.  Dispense: 30 tablet; Refill: 3    4. Immunization due  -     Pneumococcal Conjugate Vaccine 20-Valent (PCV20)    5. COPD without exacerbation (HCC)    Follow-up in 2 months if need to continue Chantix.  Follow-up in 6 months for lab recheck and for blood pressure, hyperlipidemia.        Follow Up   Return in about 6 months (around 9/15/2023), or Hyperlipidemia, hypertension, COPD.  Patient was given instructions and counseling regarding his condition or for health maintenance advice. Please see specific information pulled into the AVS if appropriate.

## 2023-03-16 ENCOUNTER — HOSPITAL ENCOUNTER (OUTPATIENT)
Dept: CT IMAGING | Facility: HOSPITAL | Age: 63
Discharge: HOME | End: 2023-03-16
Admitting: NURSE PRACTITIONER
Payer: MEDICAID

## 2023-03-16 DIAGNOSIS — F17.210 NICOTINE DEPENDENCE, CIGARETTES, UNCOMPLICATED: ICD-10-CM

## 2023-03-16 DIAGNOSIS — Z12.2 ENCOUNTER FOR SCREENING FOR LUNG CANCER: ICD-10-CM

## 2023-03-16 PROCEDURE — 71271 CT THORAX LUNG CANCER SCR C-: CPT

## 2023-03-27 ENCOUNTER — TELEPHONE (OUTPATIENT)
Dept: FAMILY MEDICINE CLINIC | Facility: CLINIC | Age: 63
End: 2023-03-27
Payer: MEDICAID

## 2023-03-27 DIAGNOSIS — I25.10 CORONARY ARTERY CALCIFICATION: Primary | ICD-10-CM

## 2023-03-27 DIAGNOSIS — I25.84 CORONARY ARTERY CALCIFICATION: Primary | ICD-10-CM

## 2023-03-27 PROBLEM — J43.9 EMPHYSEMA, UNSPECIFIED: Status: ACTIVE | Noted: 2023-03-27

## 2023-03-27 NOTE — TELEPHONE ENCOUNTER
"Called patient regarding his CT chest low dose.    CT Chest Low Dose Cancer Screening WO (03/16/2023 08:39)  \"IMPRESSION:  Impression:   1.Moderate coronary artery calcification. If the patient has not had a cardiovascular workup this should be considered.  2.There are emphysematous changes.  3.Groundglass changes left upper lobe that may reflect sequela to reported Covid pneumonia     Recommendation:   Annual low dose screening CT.\"       Patient did recently have COVID about 4 to 6 weeks ago.  He states he is doing okay from that.  Discussed seeing cardiology for work-up for coronary artery calcification.  He is agreeable.  He denies any recent chest pain or shortness of breath.    He he is currently trying to stop smoking he is on Chantix states he is doing okay.  He has not seen a pulmonologist.  He does have a pending pulmonary function test.  He states he had to cancel it due to having COVID.  Patient has been provided with the telephone number to reschedule and that would be central scheduling will [phone].     He is to follow up as planned and as needed.   Aicha Maldonado, APRN    "

## 2023-03-31 ENCOUNTER — HOSPITAL ENCOUNTER (OUTPATIENT)
Dept: PULMONOLOGY | Facility: HOSPITAL | Age: 63
Discharge: HOME | End: 2023-03-31
Admitting: NURSE PRACTITIONER
Payer: MEDICAID

## 2023-03-31 VITALS — HEART RATE: 100 BPM | RESPIRATION RATE: 18 BRPM

## 2023-03-31 DIAGNOSIS — J44.9 COPD WITHOUT EXACERBATION: ICD-10-CM

## 2023-03-31 PROCEDURE — 94727 GAS DIL/WSHOT DETER LNG VOL: CPT

## 2023-03-31 PROCEDURE — 94060 EVALUATION OF WHEEZING: CPT

## 2023-03-31 PROCEDURE — 94060 EVALUATION OF WHEEZING: CPT | Performed by: INTERNAL MEDICINE

## 2023-03-31 PROCEDURE — 94640 AIRWAY INHALATION TREATMENT: CPT

## 2023-03-31 PROCEDURE — 94727 GAS DIL/WSHOT DETER LNG VOL: CPT | Performed by: INTERNAL MEDICINE

## 2023-03-31 RX ORDER — ALBUTEROL SULFATE 2.5 MG/3ML
2.5 SOLUTION RESPIRATORY (INHALATION) ONCE
Status: COMPLETED | OUTPATIENT
Start: 2023-03-31 | End: 2023-03-31

## 2023-03-31 RX ADMIN — ALBUTEROL SULFATE 2.5 MG: 2.5 SOLUTION RESPIRATORY (INHALATION) at 08:25

## 2023-04-14 ENCOUNTER — HOSPITAL ENCOUNTER (OUTPATIENT)
Dept: CARDIOLOGY | Facility: HOSPITAL | Age: 63
Discharge: HOME | End: 2023-04-14
Payer: MEDICAID

## 2023-04-14 ENCOUNTER — OFFICE VISIT (OUTPATIENT)
Dept: CARDIOLOGY | Facility: HOSPITAL | Age: 63
End: 2023-04-14
Payer: MEDICAID

## 2023-04-14 VITALS
HEART RATE: 92 BPM | RESPIRATION RATE: 20 BRPM | OXYGEN SATURATION: 95 % | HEIGHT: 71 IN | BODY MASS INDEX: 23.26 KG/M2 | DIASTOLIC BLOOD PRESSURE: 78 MMHG | SYSTOLIC BLOOD PRESSURE: 134 MMHG | WEIGHT: 166.13 LBS | TEMPERATURE: 97.5 F

## 2023-04-14 DIAGNOSIS — I25.10 CORONARY ARTERY DISEASE INVOLVING NATIVE CORONARY ARTERY OF NATIVE HEART, UNSPECIFIED WHETHER ANGINA PRESENT: ICD-10-CM

## 2023-04-14 DIAGNOSIS — R06.02 SHORTNESS OF BREATH: ICD-10-CM

## 2023-04-14 DIAGNOSIS — I10 PRIMARY HYPERTENSION: ICD-10-CM

## 2023-04-14 DIAGNOSIS — I25.10 CORONARY ARTERY DISEASE INVOLVING NATIVE CORONARY ARTERY OF NATIVE HEART, UNSPECIFIED WHETHER ANGINA PRESENT: Primary | ICD-10-CM

## 2023-04-14 DIAGNOSIS — K21.9 GASTROESOPHAGEAL REFLUX DISEASE, UNSPECIFIED WHETHER ESOPHAGITIS PRESENT: ICD-10-CM

## 2023-04-14 DIAGNOSIS — E78.5 HYPERLIPIDEMIA, UNSPECIFIED HYPERLIPIDEMIA TYPE: ICD-10-CM

## 2023-04-14 LAB
QT INTERVAL: 346 MS
QTC INTERVAL: 418 MS

## 2023-04-14 PROCEDURE — 93005 ELECTROCARDIOGRAM TRACING: CPT | Performed by: NURSE PRACTITIONER

## 2023-04-14 RX ORDER — ASPIRIN 81 MG/1
81 TABLET ORAL DAILY
Qty: 30 TABLET | Refills: 3 | Status: SHIPPED | OUTPATIENT
Start: 2023-04-14

## 2023-04-14 RX ORDER — OMEPRAZOLE 20 MG/1
20 CAPSULE, DELAYED RELEASE ORAL NIGHTLY PRN
Qty: 30 CAPSULE | Refills: 1 | Status: SHIPPED | OUTPATIENT
Start: 2023-04-14

## 2023-04-14 NOTE — PROGRESS NOTES
"[facility], [facility] Heart and Vascular    Chief Complaint  Coronary Artery Disease and Establish Care    Subjective    History of Present Illness {CC  Problem List  Visit  Diagnosis   Encounters  Notes  Medications  Labs  Result Review Imaging  Media :23}     Oscar Avila presents to [facility] CARDIOLOGY for   History of Present Illness     62-year-old male with history of COPD, tobacco use, hyperlipidemia, hypertension.  Patient recently completed a CT scan of the chest for cancer screening.    He was found to have moderate coronary artery calcification, groundglass changes of the left upper lobe (possible COVID-pneumonia)    Referred to the heart valve center for evaluation.  Patient also has a referral for PFTs. With moderate COPD on inhalers.     Pt with dyspnea.  No CP or pressure.  Recent worsening of GERD like s/s.     No dizziness, near syncope, syncope, edema.    No hx of ischemic evaluation.      Recently started Chantix for smoking cessation.     Cardiac risk factors:  History of CVD, dyslipidemia, hypertension.  Tobacco use, gender, age (older than 55 for men, 65 for women)        Objective     Vital Signs:   Vitals:    04/14/23 0857 04/14/23 0902 04/14/23 0903   BP: 137/78 128/78 134/78   BP Location: Right arm Left arm Left arm   Patient Position: Sitting Standing Sitting   Cuff Size: Adult Adult Adult   Pulse: 85 90 92   Resp:   20   Temp:   97.5 °F (36.4 °C)   TempSrc:   Temporal   SpO2: 96% 95% 95%   Weight:   75.4 kg (166 lb 2 oz)   Height:   180.3 cm (70.98\")     Body mass index is 23.18 kg/m².  Physical Exam  Vitals reviewed.   Constitutional:       General: He is not in acute distress.     Appearance: Normal appearance.   Cardiovascular:      Rate and Rhythm: Normal rate and regular rhythm.      Pulses:           Radial pulses are 2+ on the right side and 2+ on the left side.        Dorsalis pedis pulses are 2+ on the right side " and 2+ on the left side.        Posterior tibial pulses are 2+ on the right side and 2+ on the left side.      Heart sounds: Normal heart sounds.   Pulmonary:      Effort: Pulmonary effort is normal.      Breath sounds: Normal breath sounds.   Musculoskeletal:      Right lower leg: No edema.      Left lower leg: No edema.   Skin:     General: Skin is warm and dry.   Neurological:      Mental Status: He is alert.   Psychiatric:         Mood and Affect: Mood normal.         Behavior: Behavior is cooperative.              Result Review  Data Reviewed:{ Labs  Result Review  Imaging  Med Tab  Media :23}   EKG 11/23/2020: Sinus tachycardia 105 bpm              Assessment and Plan {CC Problem List  Visit Diagnosis  ROS  Review (Popup)  Health Maintenance  Quality  BestPractice  Medications  SmartSets  SnapShot Encounters  Media :23}   1. Coronary artery disease involving native coronary artery of native heart, unspecified whether angina present  Dyspnea, ? Anginal     Cardiac risk factors:  History of CVD, dyslipidemia, hypertension.  Tobacco use, gender, age (older than 55 for men, 65 for women)    - ECG 12 Lead; NSR 88 bpm ? Non-specific ST changes vs old anteroseptal infarct.  NO previous EKG for comparison.     Initial:  - aspirin 81 MG EC tablet; Take 1 tablet by mouth Daily.  Dispense: 30 tablet; Refill: 3    Continue statin      - Stress Test With Myocardial Perfusion One Day; Future      - Ambulatory Referral to Cardiology:  Requesting Dr. Shine for continued management.     2. Shortness of breath  With known COPD  Recent COVID  Moderate CAD    - ECG 12 Lead; Future  - Stress Test With Myocardial Perfusion One Day; Future  - Adult Transthoracic Echo Complete W/ Cont if Necessary Per Protocol; Future    3. Primary hypertension  Continue lisinopril    4. Hyperlipidemia, unspecified hyperlipidemia type  statin    5. Gastroesophageal reflux disease, unspecified whether esophagitis  present  Initiate  - omeprazole (priLOSEC) 20 MG capsule; Take 1 capsule by mouth At Night As Needed (acid reflux).  Dispense: 30 capsule; Refill: 1    6.  Tobacco use  Motivated  Currently on Chantix.   Goal complete cessation.     F/u with PCP and cardiology (to be scheduled).  F/u [facility] as needed or as determined by cardiology.       Follow Up {Instructions Charge Capture  Follow-up Communications :23}   Return if symptoms worsen or fail to improve.    Patient was given instructions and counseling regarding his condition or for health maintenance advice. Please see specific information pulled into the AVS if appropriate.  Patient was instructed to call the Heart and Valve Center with any questions, concerns, or worsening symptoms.

## 2023-04-23 PROBLEM — J44.9 COPD (CHRONIC OBSTRUCTIVE PULMONARY DISEASE): Chronic | Status: ACTIVE | Noted: 2023-01-17

## 2023-04-25 ENCOUNTER — HOSPITAL ENCOUNTER (OUTPATIENT)
Dept: CARDIOLOGY | Facility: HOSPITAL | Age: 63
Discharge: HOME | End: 2023-04-25
Payer: MEDICAID

## 2023-04-25 DIAGNOSIS — R06.02 SHORTNESS OF BREATH: ICD-10-CM

## 2023-04-25 DIAGNOSIS — I25.10 CORONARY ARTERY DISEASE INVOLVING NATIVE CORONARY ARTERY OF NATIVE HEART, UNSPECIFIED WHETHER ANGINA PRESENT: ICD-10-CM

## 2023-04-25 LAB
BH CV ECHO MEAS - AO MAX PG: 7.4 MMHG
BH CV ECHO MEAS - AO MEAN PG: 4 MMHG
BH CV ECHO MEAS - AO ROOT DIAM: 3.1 CM
BH CV ECHO MEAS - AO V2 MAX: 136 CM/SEC
BH CV ECHO MEAS - AO V2 VTI: 25 CM
BH CV ECHO MEAS - AVA(I,D): 2.3 CM2
BH CV ECHO MEAS - EDV(CUBED): 74.1 ML
BH CV ECHO MEAS - EDV(MOD-SP2): 86.9 ML
BH CV ECHO MEAS - EDV(MOD-SP4): 65.2 ML
BH CV ECHO MEAS - EF(MOD-BP): 58.6 %
BH CV ECHO MEAS - EF(MOD-SP2): 55.8 %
BH CV ECHO MEAS - EF(MOD-SP4): 61.2 %
BH CV ECHO MEAS - ESV(CUBED): 32.8 ML
BH CV ECHO MEAS - ESV(MOD-SP2): 38.4 ML
BH CV ECHO MEAS - ESV(MOD-SP4): 25.3 ML
BH CV ECHO MEAS - FS: 23.8 %
BH CV ECHO MEAS - IVS/LVPW: 0.91 CM
BH CV ECHO MEAS - IVSD: 1 CM
BH CV ECHO MEAS - LA DIMENSION: 4 CM
BH CV ECHO MEAS - LAT PEAK E' VEL: 10 CM/SEC
BH CV ECHO MEAS - LV MASS(C)D: 147 GRAMS
BH CV ECHO MEAS - LV MAX PG: 4.4 MMHG
BH CV ECHO MEAS - LV MEAN PG: 2 MMHG
BH CV ECHO MEAS - LV V1 MAX: 105 CM/SEC
BH CV ECHO MEAS - LV V1 VTI: 18.3 CM
BH CV ECHO MEAS - LVIDD: 4.2 CM
BH CV ECHO MEAS - LVIDS: 3.2 CM
BH CV ECHO MEAS - LVOT AREA: 3.1 CM2
BH CV ECHO MEAS - LVOT DIAM: 2 CM
BH CV ECHO MEAS - LVPWD: 1.1 CM
BH CV ECHO MEAS - MED PEAK E' VEL: 9 CM/SEC
BH CV ECHO MEAS - MV A MAX VEL: 81.8 CM/SEC
BH CV ECHO MEAS - MV DEC TIME: 0.23 MSEC
BH CV ECHO MEAS - MV E MAX VEL: 98 CM/SEC
BH CV ECHO MEAS - MV E/A: 1.2
BH CV ECHO MEAS - PA ACC TIME: 0.22 SEC
BH CV ECHO MEAS - PA PR(ACCEL): -19.1 MMHG
BH CV ECHO MEAS - PA V2 MAX: 103 CM/SEC
BH CV ECHO MEAS - RAP SYSTOLE: 3 MMHG
BH CV ECHO MEAS - RVSP: 10 MMHG
BH CV ECHO MEAS - SV(LVOT): 57.5 ML
BH CV ECHO MEAS - SV(MOD-SP2): 48.5 ML
BH CV ECHO MEAS - SV(MOD-SP4): 39.9 ML
BH CV ECHO MEAS - TAPSE (>1.6): 2.04 CM
BH CV ECHO MEAS - TR MAX PG: 7 MMHG
BH CV ECHO MEAS - TR MAX VEL: 132 CM/SEC
BH CV ECHO MEASUREMENTS AVERAGE E/E' RATIO: 10.32
BH CV REST NUCLEAR ISOTOPE DOSE: 9.9 MCI
BH CV STRESS BP STAGE 1: NORMAL
BH CV STRESS BP STAGE 3: NORMAL
BH CV STRESS COMMENTS STAGE 1: NORMAL
BH CV STRESS DOSE REGADENOSON STAGE 1: 0.4
BH CV STRESS DURATION MIN STAGE 1: 1
BH CV STRESS DURATION MIN STAGE 2: 1
BH CV STRESS DURATION MIN STAGE 3: 1
BH CV STRESS DURATION MIN STAGE 4: 1
BH CV STRESS DURATION SEC STAGE 1: 0
BH CV STRESS DURATION SEC STAGE 2: 0
BH CV STRESS DURATION SEC STAGE 3: 0
BH CV STRESS DURATION SEC STAGE 4: 0
BH CV STRESS HR STAGE 1: 107
BH CV STRESS HR STAGE 2: 112
BH CV STRESS HR STAGE 3: 110
BH CV STRESS HR STAGE 4: 107
BH CV STRESS METS STAGE 1: 5
BH CV STRESS NUCLEAR ISOTOPE DOSE: 33 MCI
BH CV STRESS O2 STAGE 1: 97
BH CV STRESS O2 STAGE 2: 98
BH CV STRESS O2 STAGE 3: 99
BH CV STRESS O2 STAGE 4: 99
BH CV STRESS PROTOCOL 1: NORMAL
BH CV STRESS RECOVERY BP: NORMAL MMHG
BH CV STRESS RECOVERY HR: 106 BPM
BH CV STRESS RECOVERY O2: 98 %
BH CV STRESS STAGE 1: 1
BH CV STRESS STAGE 2: 2
BH CV STRESS STAGE 3: 3
BH CV STRESS STAGE 4: 4
BH CV XLRA - RV BASE: 4.2 CM
BH CV XLRA - RV LENGTH: 6.1 CM
BH CV XLRA - RV MID: 2.8 CM
BH CV XLRA - TDI S': 11.6 CM/SEC
LEFT ATRIUM VOLUME INDEX: 22.5 ML/M2
LV EF NUC BP: 71 %
MAXIMAL PREDICTED HEART RATE: 157 BPM
MAXIMAL PREDICTED HEART RATE: 157 BPM
PERCENT MAX PREDICTED HR: 72.61 %
STRESS BASELINE BP: NORMAL MMHG
STRESS BASELINE HR: 92 BPM
STRESS O2 SAT REST: 98 %
STRESS PERCENT HR: 85 %
STRESS POST ESTIMATED WORKLOAD: 1 METS
STRESS POST EXERCISE DUR MIN: 4 MIN
STRESS POST EXERCISE DUR SEC: 0 SEC
STRESS POST O2 SAT PEAK: 99 %
STRESS POST PEAK BP: NORMAL MMHG
STRESS POST PEAK HR: 114 BPM
STRESS TARGET HR: 133 BPM
STRESS TARGET HR: 133 BPM

## 2023-04-25 PROCEDURE — 0 TECHNETIUM SESTAMIBI: Performed by: NURSE PRACTITIONER

## 2023-04-25 PROCEDURE — 78452 HT MUSCLE IMAGE SPECT MULT: CPT | Performed by: INTERNAL MEDICINE

## 2023-04-25 PROCEDURE — 93306 TTE W/DOPPLER COMPLETE: CPT | Performed by: INTERNAL MEDICINE

## 2023-04-25 PROCEDURE — 93306 TTE W/DOPPLER COMPLETE: CPT

## 2023-04-25 PROCEDURE — 25010000002 REGADENOSON 0.4 MG/5ML SOLUTION: Performed by: NURSE PRACTITIONER

## 2023-04-25 PROCEDURE — 78452 HT MUSCLE IMAGE SPECT MULT: CPT

## 2023-04-25 PROCEDURE — 93018 CV STRESS TEST I&R ONLY: CPT | Performed by: INTERNAL MEDICINE

## 2023-04-25 PROCEDURE — A9500 TC99M SESTAMIBI: HCPCS | Performed by: NURSE PRACTITIONER

## 2023-04-25 PROCEDURE — 93017 CV STRESS TEST TRACING ONLY: CPT

## 2023-04-25 RX ORDER — REGADENOSON 0.08 MG/ML
0.4 INJECTION, SOLUTION INTRAVENOUS ONCE
Status: COMPLETED | OUTPATIENT
Start: 2023-04-25 | End: 2023-04-25

## 2023-04-25 RX ADMIN — REGADENOSON 0.4 MG: 0.08 INJECTION, SOLUTION INTRAVENOUS at 09:07

## 2023-04-25 RX ADMIN — TECHNETIUM TC 99M SESTAMIBI 1 DOSE: 1 INJECTION INTRAVENOUS at 07:30

## 2023-04-25 RX ADMIN — TECHNETIUM TC 99M SESTAMIBI 1 DOSE: 1 INJECTION INTRAVENOUS at 09:10

## 2023-04-26 NOTE — PROGRESS NOTES
Your echocardiogram results have been reviewed.  Your heart muscle function is normal.  No concerning valvular disease was seen.  Your results are considered acceptable.

## 2023-04-26 NOTE — PROGRESS NOTES
Your myocardial perfusion stress test results have been reviewed.  No evidence of a heart blockage affecting blood flow during your stress test.  Your results are acceptable.    Your results will be discussed further at your follow-up visit with your cardiologist.  At this time continue your medications as prescribed.

## 2023-04-30 NOTE — PROGRESS NOTES
[facility] Cardiology   Consult  Oscar Avila  1960    VISIT DATE:  05/02/23    PCP:   Aicha Maldonado, APRN  [address] [facility] [address] [place] [id number]        CC:  Establish Care (Coronary artery disease involving native coronary artery of native )      Problem List:  1.  Moderate COPD by PFT  2.  Hypertension  3.  Hyperlipidemia  4.  Current smoker      Cardiac testing:  Stress test April 2023     •  Patient reported GI discomfort and lightheadedness after lexiscan injeciton which resolved in recovery.  •  No significant ST-T changes from baseline noted.  •  Diaphragmatic attenuation artifact is present.  •  Myocardial perfusion imaging indicates a normal myocardial perfusion study with no evidence of ischemia.  •  Left ventricular ejection fraction is hyperdynamic (Calculated EF > 70%).  •  Moderate coronary artery calcification present.  •  Impressions are consistent with a low risk study.     Echo April 2023  •  Left ventricular ejection fraction appears to be 56 - 60%.  •  The left ventricular cavity is small in size. Left ventricular diastolic function was normal.  •  No significant valvular stenosis or regurgitation.         History of Present Illness:  Oscar Avila  Is a 62 y.o. male with pertinent cardiac history detailed above.  Patient has a history of smoking and underwent a lung cancer screening CT in March.  This demonstrated moderate coronary artery calcifications and emphysematous changes.  He then underwent both a stress test and echocardiogram.  Fortunately this showed no evidence of obstructive CAD with no ischemia and normal ejection fraction.  He does have some dyspnea and known moderate COPD.  He is on Chantix to work on cutting down smoking.  He has recently been started on a low-dose atorvastatin and aspirin.  He is not having chest pain.  EKG in the office today normal.          Patient Active Problem List    Diagnosis Date Noted   • Coronary artery  "calcification seen on CT scan 03/27/2023   • Emphysema, unspecified 03/27/2023   • COVID-19 03/15/2023     Note Last Updated: 3/15/2023     02/15/2023 did not require hospitalization     • Hyperlipidemia 03/15/2023   • COPD (chronic obstructive pulmonary disease) 01/17/2023     Note Last Updated: 4/23/2023 04/2023 PFT 60-69% Moderate COPD      • Alcohol use        Allergies   Allergen Reactions   • Penicillins GI Intolerance     \" I VOMIT\"        Social History     Socioeconomic History   • Marital status:    Tobacco Use   • Smoking status: Every Day     Packs/day: 1.00     Years: 45.00     Pack years: 45.00     Types: Cigarettes     Start date: 1976     Passive exposure: Past   • Smokeless tobacco: Never   Vaping Use   • Vaping Use: Never used   Substance and Sexual Activity   • Alcohol use: Yes     Comment: drinks vodka - 4 pints per week. Pint and a half now05/2023   • Drug use: Yes     Comment: alcohol   • Sexual activity: Yes     Partners: Female       Family History   Problem Relation Age of Onset   • Cancer Mother         small cell cancer   • Cancer Father         colon cancer       Current Medications:    Current Outpatient Medications:   •  albuterol sulfate  (90 Base) MCG/ACT inhaler, Inhale 2 puffs Every 4 (Four) Hours As Needed for Wheezing or Shortness of Air., Disp: 18 g, Rfl: 5  •  aspirin 81 MG EC tablet, Take 1 tablet by mouth Daily., Disp: 30 tablet, Rfl: 3  •  atorvastatin (LIPITOR) 40 MG tablet, Take 1 tablet by mouth Every Night., Disp: 30 tablet, Rfl: 6  •  Fluticasone-Salmeterol (ADVAIR/WIXELA) 250-50 MCG/ACT DISKUS, Inhale 1 puff 2 (Two) Times a Day., Disp: 1 each, Rfl: 11  •  lisinopril (PRINIVIL,ZESTRIL) 10 MG tablet, Take 1 tablet by mouth Daily., Disp: 90 tablet, Rfl: 1  •  multivitamin with minerals tablet tablet, Take 1 tablet by mouth Daily., Disp: , Rfl:   •  omeprazole (priLOSEC) 20 MG capsule, Take 1 capsule by mouth At Night As Needed (acid reflux)., Disp: 30 " "capsule, Rfl: 1  •  varenicline (CHANTIX) 1 MG tablet, Take 1 tablet by mouth 2 (Two) Times a Day., Disp: 60 tablet, Rfl: 2     Review of Systems   Cardiovascular: Positive for dyspnea on exertion. Negative for chest pain.   Respiratory: Positive for cough.        Vitals:    05/02/23 1004   BP: 136/82   BP Location: Right arm   Patient Position: Sitting   Pulse: 83   SpO2: 96%   Weight: 76.7 kg (169 lb)   Height: 180.3 cm (71\")       Physical Exam  Constitutional:       Appearance: Normal appearance.   Cardiovascular:      Rate and Rhythm: Normal rate and regular rhythm.      Pulses: Normal pulses.      Heart sounds: Normal heart sounds.   Pulmonary:      Effort: Pulmonary effort is normal.      Breath sounds: Normal breath sounds.   Neurological:      General: No focal deficit present.      Mental Status: He is alert.   Psychiatric:         Mood and Affect: Mood normal.         Diagnostic Data:    ECG 12 Lead    Date/Time: 5/2/2023 10:16 AM  Performed by: Reuben Shine MD  Authorized by: Reuben Shine MD   Comparison: compared with previous ECG from 4/14/2023  Similar to previous ECG  Rhythm: sinus rhythm  Rate: normal  BPM: 83  QRS axis: normal    Clinical impression: normal ECG          Lab Results   Component Value Date    TRIG 83 01/17/2023    HDL 68 (H) 01/17/2023     Lab Results   Component Value Date    GLUCOSE 95 01/17/2023    BUN 6 (L) 01/17/2023    CREATININE 0.79 01/17/2023     01/17/2023    K 4.0 01/17/2023    CL 99 01/17/2023    CO2 25.1 01/17/2023     Lab Results   Component Value Date    HGBA1C 5.40 01/17/2023     Lab Results   Component Value Date    WBC 10.38 01/17/2023    HGB 16.6 01/17/2023    HCT 47.4 01/17/2023     01/17/2023       Assessment:   Diagnosis Plan   1. Dyspnea on exertion  ECG 12 Lead      2. Hyperlipidemia, unspecified hyperlipidemia type  atorvastatin (LIPITOR) 40 MG tablet          Plan:      1.  Dyspnea  -Appears pulmonary in nature, has " COPD  -Continue inhaler regimen    2.  Hypertension  -Controlled on lisinopril, no changes made    3.  Hyperlipidemia  -, needs aggressive lowering due to presence of coronary artery calcification  -increase lipitor to 40mg daily  -Repeat lipids in  4 months he has a PCP appointment in September    4.  Tobacco abuse  Working on quitting with chantix  Provided encouragement    5.  Nonobstructive CAD  -Coronary artery calcifications present, no ischemia on stress  -Continue aspirin.  Increase statin intensity as detailed above.      Follow-up in 6 months.      Advance Care Planning   ACP discussion was held with the patient during this visit. Patient does not have an advance directive, information provided.       Reuben Shine MD [facility]

## 2023-05-02 ENCOUNTER — OFFICE VISIT (OUTPATIENT)
Dept: CARDIOLOGY | Facility: CLINIC | Age: 63
End: 2023-05-02
Payer: MEDICAID

## 2023-05-02 VITALS
HEART RATE: 83 BPM | DIASTOLIC BLOOD PRESSURE: 82 MMHG | SYSTOLIC BLOOD PRESSURE: 136 MMHG | OXYGEN SATURATION: 96 % | BODY MASS INDEX: 23.66 KG/M2 | WEIGHT: 169 LBS | HEIGHT: 71 IN

## 2023-05-02 DIAGNOSIS — R06.09 DYSPNEA ON EXERTION: Primary | ICD-10-CM

## 2023-05-02 DIAGNOSIS — E78.5 HYPERLIPIDEMIA, UNSPECIFIED HYPERLIPIDEMIA TYPE: ICD-10-CM

## 2023-05-02 PROCEDURE — 99244 OFF/OP CNSLTJ NEW/EST MOD 40: CPT | Performed by: INTERNAL MEDICINE

## 2023-05-02 PROCEDURE — 93000 ELECTROCARDIOGRAM COMPLETE: CPT | Performed by: INTERNAL MEDICINE

## 2023-05-02 RX ORDER — ATORVASTATIN CALCIUM 40 MG/1
40 TABLET, FILM COATED ORAL NIGHTLY
Qty: 30 TABLET | Refills: 6 | Status: SHIPPED | OUTPATIENT
Start: 2023-05-02

## 2023-05-18 ENCOUNTER — OFFICE VISIT (OUTPATIENT)
Dept: ORTHOPEDIC SURGERY | Facility: CLINIC | Age: 63
End: 2023-05-18
Payer: MEDICAID

## 2023-05-18 ENCOUNTER — TELEPHONE (OUTPATIENT)
Dept: CARDIOLOGY | Facility: CLINIC | Age: 63
End: 2023-05-18
Payer: MEDICAID

## 2023-05-18 VITALS
DIASTOLIC BLOOD PRESSURE: 78 MMHG | SYSTOLIC BLOOD PRESSURE: 124 MMHG | BODY MASS INDEX: 23.67 KG/M2 | HEIGHT: 71 IN | WEIGHT: 169.09 LBS

## 2023-05-18 DIAGNOSIS — G56.01 CARPAL TUNNEL SYNDROME ON RIGHT: Primary | ICD-10-CM

## 2023-05-18 DIAGNOSIS — R20.0 NUMBNESS AND TINGLING IN RIGHT HAND: ICD-10-CM

## 2023-05-18 DIAGNOSIS — R20.2 NUMBNESS AND TINGLING IN RIGHT HAND: ICD-10-CM

## 2023-05-18 NOTE — TELEPHONE ENCOUNTER
Low risk from a cardiac perspective.  He had a recent stress test showing no ischemia.  He does have baseline COPD which increases his pulmonary risk.  Should be able to proceed with surgery  at low risk from my standpoint.

## 2023-05-18 NOTE — PROGRESS NOTES
[facility] Follow Up       Office Follow Up Visit       Patient Name: Oscar Avila    Chief Complaint:   Chief Complaint   Patient presents with   • Follow-up     3 months- Carpal tunnel syndrome on right        Referring Physician: No ref. provider found    History of Present Illness:   Oscar Avila returns to clinic today for follow-up of right carpal tunnel syndrome.  Last visit on 2/16/2023.  Performed cortisone injection for carpal tunnel at that time.  He reports relief for a couple of weeks, however symptoms have returned and worsened.  He states his right hand persistently becomes numb.  Associated pain.    We discussed surgical carpal tunnel release last visit.  He was smoking a pack and a half a day.  Now on Chantix.  He has cut down considerably.  Now smoking about half pack to a pack a day.      Subjective     Review of Systems   Constitutional: Negative.  Negative for chills, fatigue and fever.   HENT: Negative.  Negative for congestion and dental problem.    Eyes: Negative.  Negative for blurred vision.   Respiratory: Negative.  Negative for shortness of breath.    Cardiovascular: Negative.  Negative for leg swelling.   Gastrointestinal: Negative.  Negative for abdominal pain.   Endocrine: Negative.  Negative for polyuria.   Genitourinary: Negative.  Negative for difficulty urinating.   Musculoskeletal: Positive for arthralgias.   Skin: Negative.    Allergic/Immunologic: Negative.    Neurological: Negative.    Hematological: Negative.  Negative for adenopathy.   Psychiatric/Behavioral: Negative.  Negative for behavioral problems.        I have reviewed and updated the following portions of the patient's history and review of systems: allergies, current medications, past family history, past medical history, past social history, past surgical history and problem list.    Medications:   Current Outpatient  "Medications:   •  albuterol sulfate  (90 Base) MCG/ACT inhaler, Inhale 2 puffs Every 4 (Four) Hours As Needed for Wheezing or Shortness of Air., Disp: 18 g, Rfl: 5  •  aspirin 81 MG EC tablet, Take 1 tablet by mouth Daily., Disp: 30 tablet, Rfl: 3  •  atorvastatin (LIPITOR) 40 MG tablet, Take 1 tablet by mouth Every Night., Disp: 30 tablet, Rfl: 6  •  Fluticasone-Salmeterol (ADVAIR/WIXELA) 250-50 MCG/ACT DISKUS, Inhale 1 puff 2 (Two) Times a Day., Disp: 1 each, Rfl: 11  •  lisinopril (PRINIVIL,ZESTRIL) 10 MG tablet, Take 1 tablet by mouth Daily., Disp: 90 tablet, Rfl: 1  •  multivitamin with minerals tablet tablet, Take 1 tablet by mouth Daily., Disp: , Rfl:   •  omeprazole (priLOSEC) 20 MG capsule, Take 1 capsule by mouth At Night As Needed (acid reflux)., Disp: 30 capsule, Rfl: 1  •  varenicline (CHANTIX) 1 MG tablet, Take 1 tablet by mouth 2 (Two) Times a Day., Disp: 60 tablet, Rfl: 2    Allergies:   Allergies   Allergen Reactions   • Penicillins GI Intolerance     \" I VOMIT\"          Objective      Vital Signs:   Vitals:    05/18/23 0756   BP: 124/78   Weight: 76.7 kg (169 lb 1.5 oz)   Height: 180.3 cm (70.98\")       Ortho Exam:  HAND AND WRIST EXAM    SIDE: RIGHT    Peripheral Vascular   Bilateral Upper Extremity    No cyanotic nail beds    Pink nail beds and rapid capillary refill   Palpation    Radial Pulse - Bilaterally normal    Neurologic   Sensory: Light touch intact      Upper Extremity    Wrist extensors: 5/5    Wrist flexors: 5/5    Intrinsics: 5/5    Musculoskeletal      Elbow    Forearm supination: AROM - 90 degrees    Forearm pronation: AROM - 90 degrees     Inspection and Palpation   Wrist      Tenderness - none    Swelling - none    Crepitus - none    Muscle tone - thenar atrophy present        ROJM:     Wrist    Flexion: AROM - 90 degrees    Extension: AROM - 90 degrees     Deformities, Malalignments, Discrepancies    None     Functional Testing   Wrist    Tinel's Sign-- " positive    Phalen's Sign-- positive    Carpal Compression Test-- negative    Finklestein's Test-- negative    Thumb CMC joint--negative       Strength and Tone     strength: weak compared to left      Results Review:  EMG Right upper extremity performed 2/10/23  Severe median nerve neuropathy at right wrist  Median motor latency       Assessment / Plan      Assessment:   Diagnoses and all orders for this visit:    1. Carpal tunnel syndrome on right (Primary)  -     External Facility Surgical/Procedural Request; Future    2. Numbness and tingling in right hand  -     External Facility Surgical/Procedural Request; Future          Plan:  1. Patient responded well to carpal tunnel injection 2/16/23, however symptoms have returned and worsening.    2. He has done well from the left carpal tunnel release 3 to 4 years ago at [facility].  3. He has been working on cutting down smoking.  Now down to less than a pack a day.  He reports his cardiologist cleared him for surgery last visit on 5/2/2023.  4. He would like to proceed with surgical intervention.  Discussed RIGHT carpal tunnel release with Dr. Acosta.  Counseled regarding recovery. Counseled regarding smoking cessation. He understands he must avoid lifting and forceful gripping for the first 4 weeks from surgery.    Follow Up:   Plan for Right carpal tunnel release with Dr. Dave Mojica PA-C  [facility]    Dictated using Dragon Speech Recognition.

## 2023-05-18 NOTE — TELEPHONE ENCOUNTER
Patient called and states that he is scheduled for right carpal tunnel release on 7/14/23 with Dr. Esa Acosta.         What is patient's cardiac risk?      Please advise.       Abbie ROMERO# [phone]

## 2023-06-09 DIAGNOSIS — K21.9 GASTROESOPHAGEAL REFLUX DISEASE, UNSPECIFIED WHETHER ESOPHAGITIS PRESENT: ICD-10-CM

## 2023-06-09 RX ORDER — OMEPRAZOLE 20 MG/1
CAPSULE, DELAYED RELEASE ORAL
Qty: 30 CAPSULE | Refills: 1 | OUTPATIENT
Start: 2023-06-09

## 2023-06-09 NOTE — TELEPHONE ENCOUNTER
Non-cardiac medication. Last visit 4/14/23 with no follow-up currently scheduled. Please reroute, deny or refill as appropriate

## 2023-06-09 NOTE — TELEPHONE ENCOUNTER
Patient to see primary care for continued management of this noncardiac medication.  Currently no follow-up in the heart valve center.

## 2023-06-12 ENCOUNTER — OUTSIDE FACILITY SERVICE (OUTPATIENT)
Dept: ORTHOPEDIC SURGERY | Facility: CLINIC | Age: 63
End: 2023-06-12
Payer: MEDICAID

## 2023-06-12 DIAGNOSIS — G56.01 CARPAL TUNNEL SYNDROME ON RIGHT: Primary | ICD-10-CM

## 2023-06-12 RX ORDER — OXYCODONE HYDROCHLORIDE 5 MG/1
5 TABLET ORAL EVERY 6 HOURS PRN
Qty: 15 TABLET | Refills: 0 | Status: SHIPPED | OUTPATIENT
Start: 2023-06-12

## 2023-06-12 RX ORDER — MELOXICAM 7.5 MG/1
7.5 TABLET ORAL DAILY
Qty: 30 TABLET | Refills: 0 | Status: SHIPPED | OUTPATIENT
Start: 2023-06-12

## 2023-06-12 RX ORDER — ONDANSETRON 4 MG/1
4 TABLET, FILM COATED ORAL EVERY 8 HOURS PRN
Qty: 10 TABLET | Refills: 1 | Status: SHIPPED | OUTPATIENT
Start: 2023-06-12

## 2023-06-12 RX ORDER — SENNOSIDES 8.6 MG
650 CAPSULE ORAL EVERY 8 HOURS PRN
Qty: 30 TABLET | Refills: 0 | Status: SHIPPED | OUTPATIENT
Start: 2023-06-12

## 2023-06-12 RX ORDER — DOCUSATE SODIUM 100 MG/1
100 CAPSULE, LIQUID FILLED ORAL 2 TIMES DAILY PRN
Qty: 62 CAPSULE | Refills: 0 | Status: SHIPPED | OUTPATIENT
Start: 2023-06-12

## 2023-06-15 ENCOUNTER — TELEPHONE (OUTPATIENT)
Dept: ORTHOPEDIC SURGERY | Facility: CLINIC | Age: 63
End: 2023-06-15
Payer: MEDICAID

## 2023-06-15 NOTE — TELEPHONE ENCOUNTER
Pt called. He lost his discharge paperwork. He had surgery on right wrist on 6/12/23.    Please call patient to go over his discharge instructions for when to change bandage.       Call patient can leave message  [phone]

## 2023-06-15 NOTE — TELEPHONE ENCOUNTER
I spoke with the patient to see what questions he had for after care. He states he had carpal tunnel surgery done 5/12/23 he wanted to know when he could take the splint off per  they only have to stay in that for 2 days post surgery. I let the patient know he can go ahead and take that off. The patient verbalized understanding.     Zuleika Maza

## 2023-06-16 DIAGNOSIS — K21.9 GASTROESOPHAGEAL REFLUX DISEASE, UNSPECIFIED WHETHER ESOPHAGITIS PRESENT: ICD-10-CM

## 2023-06-16 RX ORDER — OMEPRAZOLE 20 MG/1
20 CAPSULE, DELAYED RELEASE ORAL NIGHTLY PRN
Qty: 30 CAPSULE | Refills: 1 | OUTPATIENT
Start: 2023-06-16

## 2023-06-17 DIAGNOSIS — K21.9 GASTROESOPHAGEAL REFLUX DISEASE, UNSPECIFIED WHETHER ESOPHAGITIS PRESENT: ICD-10-CM

## 2023-06-17 RX ORDER — OMEPRAZOLE 20 MG/1
20 CAPSULE, DELAYED RELEASE ORAL NIGHTLY PRN
Qty: 30 CAPSULE | Refills: 1 | Status: SHIPPED | OUTPATIENT
Start: 2023-06-17

## 2023-07-27 ENCOUNTER — OFFICE VISIT (OUTPATIENT)
Dept: ORTHOPEDIC SURGERY | Facility: CLINIC | Age: 63
End: 2023-07-27
Payer: MEDICAID

## 2023-07-27 DIAGNOSIS — Z98.890 S/P CARPAL TUNNEL RELEASE: Primary | ICD-10-CM

## 2023-07-27 DIAGNOSIS — R22.30 LOCALIZED SWELLING ON HAND: ICD-10-CM

## 2023-07-27 DIAGNOSIS — M19.031 PRIMARY OSTEOARTHRITIS OF RIGHT WRIST: ICD-10-CM

## 2023-07-27 DIAGNOSIS — M79.641 RIGHT HAND PAIN: ICD-10-CM

## 2023-07-27 NOTE — PROGRESS NOTES
[facility] [place] [facility] Follow Up       Office Follow Up Visit       Patient Name: Oscar Avila    Chief Complaint:   Chief Complaint   Patient presents with   • Post-op     1 month recheck- 6 weeks s/p Right carpal tunnel release 6/12/23       Referring Physician: No ref. provider found    History of Present Illness:   Oscar Avila returns to clinic today for postop visit 6 weeks s/p right carpal tunnel release 6/12/2023 with Dr. Acosta.  He reports to be doing well.  Numbness is much improved compared to prior to surgery.  His only complaint is some thenar pain in the hand when working or sleeping. Smoker- trying to cut back.    Subjective     Review of Systems   Constitutional: Negative.  Negative for chills, fatigue and fever.   HENT: Negative.  Negative for congestion and dental problem.    Eyes: Negative.  Negative for blurred vision.   Respiratory: Negative.  Negative for shortness of breath.    Cardiovascular: Negative.  Negative for leg swelling.   Gastrointestinal: Negative.  Negative for abdominal pain.   Endocrine: Negative.  Negative for polyuria.   Genitourinary: Negative.  Negative for difficulty urinating.   Musculoskeletal:  Positive for arthralgias.   Skin: Negative.    Allergic/Immunologic: Negative.    Neurological: Negative.    Hematological: Negative.  Negative for adenopathy.   Psychiatric/Behavioral: Negative.  Negative for behavioral problems.       I have reviewed and updated the following portions of the patient's history and review of systems: allergies, current medications, past family history, past medical history, past social history, past surgical history and problem list.    Medications:   Current Outpatient Medications:   •  acetaminophen (TYLENOL) 650 MG 8 hr tablet, Take 1 tablet by mouth Every 8 (Eight) Hours As Needed for Mild Pain., Disp: 30 tablet, Rfl: 0  •  albuterol sulfate   "(90 Base) MCG/ACT inhaler, Inhale 2 puffs Every 4 (Four) Hours As Needed for Wheezing or Shortness of Air., Disp: 18 g, Rfl: 5  •  aspirin 81 MG EC tablet, Take 1 tablet by mouth Daily., Disp: 30 tablet, Rfl: 3  •  atorvastatin (LIPITOR) 40 MG tablet, Take 1 tablet by mouth Every Night., Disp: 30 tablet, Rfl: 6  •  docusate sodium (Colace) 100 MG capsule, Take 1 capsule by mouth 2 (Two) Times a Day As Needed for Constipation., Disp: 62 capsule, Rfl: 0  •  Fluticasone-Salmeterol (ADVAIR/WIXELA) 250-50 MCG/ACT DISKUS, Inhale 1 puff 2 (Two) Times a Day., Disp: 1 each, Rfl: 11  •  lisinopril (PRINIVIL,ZESTRIL) 10 MG tablet, TAKE ONE TABLET BY MOUTH DAILY, Disp: 90 tablet, Rfl: 1  •  meloxicam (MOBIC) 7.5 MG tablet, Take 1 tablet by mouth Daily., Disp: 30 tablet, Rfl: 0  •  multivitamin with minerals tablet tablet, Take 1 tablet by mouth Daily., Disp: , Rfl:   •  omeprazole (priLOSEC) 20 MG capsule, Take 1 capsule by mouth At Night As Needed (acid reflux)., Disp: 30 capsule, Rfl: 1  •  ondansetron (Zofran) 4 MG tablet, Take 1 tablet by mouth Every 8 (Eight) Hours As Needed for Nausea or Vomiting., Disp: 10 tablet, Rfl: 1  •  varenicline (CHANTIX) 1 MG tablet, Take 1 tablet by mouth 2 (Two) Times a Day., Disp: 60 tablet, Rfl: 2    Allergies:   Allergies   Allergen Reactions   • Penicillins GI Intolerance     \" I VOMIT\"          Objective      Vital Signs: There were no vitals filed for this visit.    Ortho Exam:  General: no acute distress, comfortable  Vitals reviewed in chart    Musculoskeletal Exam:    SIDE: Right hand    Tenderness: Incisional tenderness  Incision healing well with some swelling  No sign of infection  Range of motion measurements (degrees): Wrist 60/60  Painful arc of motion: No  No evidence of septic joint      Results Review:  None    Assessment / Plan      Assessment:   Diagnoses and all orders for this visit:    1. S/P carpal tunnel release (Primary)  -     Ambulatory Referral to Physical Therapy " Evaluate and treat, Ortho    2. Right hand pain  -     Ambulatory Referral to Physical Therapy Evaluate and treat, Ortho    3. Localized swelling on hand  -     Ambulatory Referral to Physical Therapy Evaluate and treat, Ortho    4. Primary osteoarthritis of right wrist          Plan:  Doing well 6 weeks s/p right carpal tunnel release.  Numbness is much improved compared to prior to surgery.  We discussed it may take 6 months to a year for return of sensation to allow nerves to wake back up.  Experiencing thenar pain around incision.  Referral to physical therapy for desensitization and massage.  He works with his hands frequently for work as an .   May take anti-inflammatories as needed for pain and swelling.  Recommended ice for swelling of hand.    Follow Up:   6 weeks      History, diagnosis and treatment plan discussed with Dr. Acosta.    Najma Mojica PA-C  [facility] [place] [facility]    Dictated using Dragon Speech Recognition.

## 2023-08-09 DIAGNOSIS — I25.10 CORONARY ARTERY DISEASE INVOLVING NATIVE CORONARY ARTERY OF NATIVE HEART, UNSPECIFIED WHETHER ANGINA PRESENT: ICD-10-CM

## 2023-08-10 RX ORDER — ASPIRIN 81 MG/1
TABLET, COATED ORAL
Qty: 90 TABLET | Refills: 3 | Status: SHIPPED | OUTPATIENT
Start: 2023-08-10

## 2023-08-19 DIAGNOSIS — K21.9 GASTROESOPHAGEAL REFLUX DISEASE, UNSPECIFIED WHETHER ESOPHAGITIS PRESENT: ICD-10-CM

## 2023-08-19 RX ORDER — OMEPRAZOLE 20 MG/1
CAPSULE, DELAYED RELEASE ORAL
Qty: 30 CAPSULE | Refills: 1 | Status: SHIPPED | OUTPATIENT
Start: 2023-08-19

## 2023-08-31 ENCOUNTER — TELEPHONE (OUTPATIENT)
Dept: ORTHOPEDIC SURGERY | Facility: CLINIC | Age: 63
End: 2023-08-31

## 2023-08-31 NOTE — TELEPHONE ENCOUNTER
Provider: GLENNY MENDOSA  Caller: PRAMOD ADAM  Relationship to Patient: SELF    Phone Number: [phone] (HAS ANSWERING MACHINE)  Reason for Call: PATIENT SAYS HE HAD AN APPT. FOR PHYSICAL THERAPY THIS MORNING AT [facility] BUT THEY TOLD HIM NOT TO COME BECAUSE THEY DID NOT HAVE AUTH FROM [facility]. PLEASE ADVISE.  When was the patient last seen: 7/27/23

## 2023-10-17 DIAGNOSIS — E78.5 HYPERLIPIDEMIA, UNSPECIFIED HYPERLIPIDEMIA TYPE: ICD-10-CM

## 2023-10-17 RX ORDER — ATORVASTATIN CALCIUM 40 MG/1
40 TABLET, FILM COATED ORAL NIGHTLY
Qty: 90 TABLET | Refills: 1 | Status: SHIPPED | OUTPATIENT
Start: 2023-10-17

## 2023-10-24 ENCOUNTER — LAB (OUTPATIENT)
Dept: LAB | Facility: HOSPITAL | Age: 63
End: 2023-10-24
Payer: MEDICAID

## 2023-10-24 ENCOUNTER — OFFICE VISIT (OUTPATIENT)
Dept: FAMILY MEDICINE CLINIC | Facility: CLINIC | Age: 63
End: 2023-10-24
Payer: MEDICAID

## 2023-10-24 VITALS
SYSTOLIC BLOOD PRESSURE: 120 MMHG | DIASTOLIC BLOOD PRESSURE: 70 MMHG | WEIGHT: 173.6 LBS | OXYGEN SATURATION: 97 % | BODY MASS INDEX: 24.22 KG/M2 | HEART RATE: 92 BPM | TEMPERATURE: 97.3 F | RESPIRATION RATE: 24 BRPM

## 2023-10-24 DIAGNOSIS — F17.200 CURRENT SMOKER: ICD-10-CM

## 2023-10-24 DIAGNOSIS — Z09 FOLLOW-UP EXAM AFTER TREATMENT: ICD-10-CM

## 2023-10-24 DIAGNOSIS — R29.818 SUSPECTED SLEEP APNEA: ICD-10-CM

## 2023-10-24 DIAGNOSIS — I10 HTN, GOAL BELOW 140/80: ICD-10-CM

## 2023-10-24 DIAGNOSIS — K21.9 GASTROESOPHAGEAL REFLUX DISEASE, UNSPECIFIED WHETHER ESOPHAGITIS PRESENT: ICD-10-CM

## 2023-10-24 DIAGNOSIS — E55.9 VITAMIN D INSUFFICIENCY: Primary | ICD-10-CM

## 2023-10-24 DIAGNOSIS — Z71.6 ENCOUNTER FOR SMOKING CESSATION COUNSELING: ICD-10-CM

## 2023-10-24 LAB
ALBUMIN SERPL-MCNC: 4 G/DL (ref 3.5–5.2)
ALBUMIN/GLOB SERPL: 1.4 G/DL
ALP SERPL-CCNC: 131 U/L (ref 39–117)
ALT SERPL W P-5'-P-CCNC: 23 U/L (ref 1–41)
ANION GAP SERPL CALCULATED.3IONS-SCNC: 9 MMOL/L (ref 5–15)
AST SERPL-CCNC: 20 U/L (ref 1–40)
BASOPHILS # BLD AUTO: 0.06 10*3/MM3 (ref 0–0.2)
BASOPHILS NFR BLD AUTO: 0.5 % (ref 0–1.5)
BILIRUB SERPL-MCNC: 0.2 MG/DL (ref 0–1.2)
BILIRUB UR QL STRIP: NEGATIVE
BUN SERPL-MCNC: 7 MG/DL (ref 8–23)
BUN/CREAT SERPL: 7.3 (ref 7–25)
CALCIUM SPEC-SCNC: 10.4 MG/DL (ref 8.6–10.5)
CHLORIDE SERPL-SCNC: 104 MMOL/L (ref 98–107)
CHOLEST SERPL-MCNC: 129 MG/DL (ref 0–200)
CLARITY UR: CLEAR
CO2 SERPL-SCNC: 25 MMOL/L (ref 22–29)
COLOR UR: ABNORMAL
CREAT SERPL-MCNC: 0.96 MG/DL (ref 0.76–1.27)
DEPRECATED RDW RBC AUTO: 44 FL (ref 37–54)
EGFRCR SERPLBLD CKD-EPI 2021: 88.3 ML/MIN/1.73
EOSINOPHIL # BLD AUTO: 0.55 10*3/MM3 (ref 0–0.4)
EOSINOPHIL NFR BLD AUTO: 4.2 % (ref 0.3–6.2)
ERYTHROCYTE [DISTWIDTH] IN BLOOD BY AUTOMATED COUNT: 12 % (ref 12.3–15.4)
GLOBULIN UR ELPH-MCNC: 2.9 GM/DL
GLUCOSE SERPL-MCNC: 85 MG/DL (ref 65–99)
GLUCOSE UR STRIP-MCNC: NEGATIVE MG/DL
HBA1C MFR BLD: 5.8 % (ref 4.8–5.6)
HCT VFR BLD AUTO: 44.1 % (ref 37.5–51)
HDLC SERPL-MCNC: 33 MG/DL (ref 40–60)
HGB BLD-MCNC: 14.6 G/DL (ref 13–17.7)
HGB UR QL STRIP.AUTO: NEGATIVE
HOLD SPECIMEN: NORMAL
IMM GRANULOCYTES # BLD AUTO: 0.07 10*3/MM3 (ref 0–0.05)
IMM GRANULOCYTES NFR BLD AUTO: 0.5 % (ref 0–0.5)
KETONES UR QL STRIP: NEGATIVE
LDLC SERPL CALC-MCNC: 76 MG/DL (ref 0–100)
LDLC/HDLC SERPL: 2.25 {RATIO}
LEUKOCYTE ESTERASE UR QL STRIP.AUTO: NEGATIVE
LYMPHOCYTES # BLD AUTO: 2.23 10*3/MM3 (ref 0.7–3.1)
LYMPHOCYTES NFR BLD AUTO: 17 % (ref 19.6–45.3)
MCH RBC QN AUTO: 33 PG (ref 26.6–33)
MCHC RBC AUTO-ENTMCNC: 33.1 G/DL (ref 31.5–35.7)
MCV RBC AUTO: 99.8 FL (ref 79–97)
MONOCYTES # BLD AUTO: 1.59 10*3/MM3 (ref 0.1–0.9)
MONOCYTES NFR BLD AUTO: 12.1 % (ref 5–12)
NEUTROPHILS NFR BLD AUTO: 65.7 % (ref 42.7–76)
NEUTROPHILS NFR BLD AUTO: 8.63 10*3/MM3 (ref 1.7–7)
NITRITE UR QL STRIP: NEGATIVE
NRBC BLD AUTO-RTO: 0 /100 WBC (ref 0–0.2)
PH UR STRIP.AUTO: 6 [PH] (ref 5–8)
PLATELET # BLD AUTO: 352 10*3/MM3 (ref 140–450)
PMV BLD AUTO: 10 FL (ref 6–12)
POTASSIUM SERPL-SCNC: 4.5 MMOL/L (ref 3.5–5.2)
PROT SERPL-MCNC: 6.9 G/DL (ref 6–8.5)
PROT UR QL STRIP: NEGATIVE
RBC # BLD AUTO: 4.42 10*6/MM3 (ref 4.14–5.8)
SODIUM SERPL-SCNC: 138 MMOL/L (ref 136–145)
SP GR UR STRIP: 1.02 (ref 1–1.03)
TRIGL SERPL-MCNC: 108 MG/DL (ref 0–150)
TSH SERPL DL<=0.05 MIU/L-ACNC: 0.48 UIU/ML (ref 0.27–4.2)
UROBILINOGEN UR QL STRIP: ABNORMAL
VLDLC SERPL-MCNC: 20 MG/DL (ref 5–40)
WBC NRBC COR # BLD: 13.13 10*3/MM3 (ref 3.4–10.8)

## 2023-10-24 PROCEDURE — 99214 OFFICE O/P EST MOD 30 MIN: CPT | Performed by: NURSE PRACTITIONER

## 2023-10-24 PROCEDURE — 85025 COMPLETE CBC W/AUTO DIFF WBC: CPT

## 2023-10-24 PROCEDURE — 84443 ASSAY THYROID STIM HORMONE: CPT

## 2023-10-24 PROCEDURE — 80053 COMPREHEN METABOLIC PANEL: CPT

## 2023-10-24 PROCEDURE — 83036 HEMOGLOBIN GLYCOSYLATED A1C: CPT

## 2023-10-24 PROCEDURE — 80061 LIPID PANEL: CPT

## 2023-10-24 PROCEDURE — 81003 URINALYSIS AUTO W/O SCOPE: CPT

## 2023-10-24 RX ORDER — VARENICLINE TARTRATE 1 MG/1
1 TABLET, FILM COATED ORAL 2 TIMES DAILY
Qty: 60 TABLET | Refills: 2 | Status: SHIPPED | OUTPATIENT
Start: 2023-10-24

## 2023-10-24 RX ORDER — OMEPRAZOLE 20 MG/1
20 CAPSULE, DELAYED RELEASE ORAL DAILY
Qty: 30 CAPSULE | Refills: 1 | Status: SHIPPED | OUTPATIENT
Start: 2023-10-24

## 2023-10-24 RX ORDER — LISINOPRIL 10 MG/1
10 TABLET ORAL DAILY
Qty: 90 TABLET | Refills: 1 | Status: SHIPPED | OUTPATIENT
Start: 2023-10-24

## 2023-10-24 NOTE — PROGRESS NOTES
Chief Complaint  Abnormal Lab and inpatient facilty follow up  (Alcohol inpatient treatment )    Subjective          Oscar Avila presents to [facility]  History of Present Illness  Patient is a 63-year-old male.  He is here for follow-up from recent discharge from [facility].  He was inpatient for alcohol treatment.  He states he was discharged on 10/10/2023.  He was discharged with some medication that started with a D.  Patient did not bring in his bottles and has no discharge summary.  He did bring in a copy of his recent labs and genetic testing results.  His provider was Felton Ann.     He states he is doing well.  He is continuing to take his medication he was prescribed.  He does state it was filled at the prescription pad in [facility].  We will have staff contact for medication.      He is continuing to smoke. He would like to restart his chantix.   He was told he had abnormal testosterone level. Free testosterone level too high.     He was told he had possible sleep apnea. He does report snoring. He would like to be evaluated. He has chronic fatigue.   He states he gets up several times per night to go to the bathroom.     His PSA was reported as normal.   His vitamin d level is low - discussed taking daily vitamin d 2000 IU with some type of calcium.           The following portions of the patient's history were reviewed and updated as appropriate: allergies, current medications, past family history, past medical history, past social history, past surgical history and problem list.    Review of Systems   Constitutional:  Positive for fatigue.   Respiratory:  Positive for cough.         Snores   Has COPD     Current smoker   Cardiovascular: Negative.    Gastrointestinal: Negative.    Genitourinary:  Positive for frequency.   Musculoskeletal:  Positive for arthralgias.   Skin: Negative.    Allergic/Immunologic: Negative.    Neurological:  Negative.    Hematological: Negative.    Psychiatric/Behavioral:  Negative for self-injury and suicidal ideas. The patient is not nervous/anxious.          Objective   Vital Signs:   /70   Pulse 92   Temp 97.3 °F (36.3 °C)   Resp 24   Wt 78.7 kg (173 lb 9.6 oz)   SpO2 97%   BMI 24.22 kg/m²    BMI is within normal parameters. No other follow-up for BMI required.      PHQ-2/9 Depression Screening  PHQ-9 Total Score: 0        Physical Exam  Vitals reviewed.   HENT:      Head: Normocephalic.      Nose: Nose normal.      Mouth/Throat:      Mouth: Mucous membranes are moist.   Eyes:      Pupils: Pupils are equal, round, and reactive to light.   Cardiovascular:      Rate and Rhythm: Normal rate and regular rhythm.      Pulses: Normal pulses.   Pulmonary:      Effort: Pulmonary effort is normal.      Breath sounds: No wheezing.      Comments: Intermittent cough- non productive  Abdominal:      General: Bowel sounds are normal.      Palpations: Abdomen is soft.   Musculoskeletal:         General: Normal range of motion.   Skin:     General: Skin is warm and dry.      Capillary Refill: Capillary refill takes less than 2 seconds.      Comments: Baron skin   Neurological:      Mental Status: He is alert and oriented to person, place, and time.   Psychiatric:         Mood and Affect: Mood normal.         Behavior: Behavior normal.        Result Review :                 Assessment and Plan    Diagnoses and all orders for this visit:    1. Vitamin D insufficiency (Primary)  -     Cholecalciferol 50 MCG (2000 UT) tablet; Take 1 tablet by mouth Daily for 360 days.  Dispense: 30 tablet; Refill: 11    2. HTN, goal below 140/80  -     lisinopril (PRINIVIL,ZESTRIL) 10 MG tablet; Take 1 tablet by mouth Daily.  Dispense: 90 tablet; Refill: 1  Chronic - stable   3. Gastroesophageal reflux disease, unspecified whether esophagitis present  -     omeprazole (priLOSEC) 20 MG capsule; Take 1 capsule by mouth Daily.  Dispense: 30  capsule; Refill: 1  -     CBC & Differential; Future  -     Comprehensive Metabolic Panel; Future  -     Hemoglobin A1c; Future  -     Lipid Panel; Future  -     TSH Rfx On Abnormal To Free T4; Future  -     Urinalysis With Culture If Indicated -; Future  Chronic stable   4. Encounter for smoking cessation counseling  -     varenicline (CHANTIX) 1 MG tablet; Take 1 tablet by mouth 2 (Two) Times a Day.  Dispense: 60 tablet; Refill: 2    5. Suspected sleep apnea  -     Ambulatory Referral to Sleep Medicine    6. Follow-up exam after treatment  -     CBC & Differential; Future  -     Comprehensive Metabolic Panel; Future  -     Hemoglobin A1c; Future  -     Lipid Panel; Future  -     Urinalysis With Culture If Indicated -; Future    7. Current smoker  Smoking cessation     Checking labs.   Starting on daily vitamin D.     Will ask patient to sign for release of discharge summary from [facility] in [place], [place].   Follow up in 2 weeks.   Will attempt to get his medication list from [facility]    Follow Up   Return in about 2 weeks (around 11/7/2023), or follow up.  Patient was given instructions and counseling regarding his condition or for health maintenance advice. Please see specific information pulled into the AVS if appropriate.

## 2023-11-07 ENCOUNTER — OFFICE VISIT (OUTPATIENT)
Dept: FAMILY MEDICINE CLINIC | Facility: CLINIC | Age: 63
End: 2023-11-07
Payer: MEDICAID

## 2023-11-07 VITALS
HEART RATE: 117 BPM | BODY MASS INDEX: 24.03 KG/M2 | WEIGHT: 172.2 LBS | RESPIRATION RATE: 20 BRPM | SYSTOLIC BLOOD PRESSURE: 142 MMHG | TEMPERATURE: 98.6 F | DIASTOLIC BLOOD PRESSURE: 70 MMHG | OXYGEN SATURATION: 95 %

## 2023-11-07 DIAGNOSIS — Z71.6 ENCOUNTER FOR SMOKING CESSATION COUNSELING: ICD-10-CM

## 2023-11-07 DIAGNOSIS — J42 CHRONIC BRONCHITIS, UNSPECIFIED CHRONIC BRONCHITIS TYPE: Chronic | ICD-10-CM

## 2023-11-07 DIAGNOSIS — K21.9 GASTROESOPHAGEAL REFLUX DISEASE, UNSPECIFIED WHETHER ESOPHAGITIS PRESENT: ICD-10-CM

## 2023-11-07 DIAGNOSIS — F41.9 ANXIETY: ICD-10-CM

## 2023-11-07 DIAGNOSIS — Z76.0 ENCOUNTER FOR MEDICATION REFILL: ICD-10-CM

## 2023-11-07 DIAGNOSIS — I10 HTN, GOAL BELOW 140/80: ICD-10-CM

## 2023-11-07 DIAGNOSIS — F17.200 CURRENT SMOKER: ICD-10-CM

## 2023-11-07 DIAGNOSIS — F10.21 ALCOHOLISM IN RECOVERY: Primary | ICD-10-CM

## 2023-11-07 PROCEDURE — 99214 OFFICE O/P EST MOD 30 MIN: CPT | Performed by: NURSE PRACTITIONER

## 2023-11-07 RX ORDER — PRAZOSIN HYDROCHLORIDE 2 MG/1
2 CAPSULE ORAL
COMMUNITY
Start: 2023-10-02 | End: 2023-11-07 | Stop reason: SDUPTHER

## 2023-11-07 RX ORDER — DISULFIRAM 500 MG/1
500 TABLET ORAL DAILY
COMMUNITY
Start: 2023-10-06 | End: 2023-11-07

## 2023-11-07 RX ORDER — DULOXETIN HYDROCHLORIDE 60 MG/1
1 CAPSULE, DELAYED RELEASE ORAL DAILY
COMMUNITY
Start: 2023-10-02 | End: 2023-11-07 | Stop reason: SDUPTHER

## 2023-11-07 RX ORDER — PRAZOSIN HYDROCHLORIDE 2 MG/1
2 CAPSULE ORAL
Qty: 90 CAPSULE | Refills: 1 | Status: SHIPPED | OUTPATIENT
Start: 2023-11-07

## 2023-11-07 RX ORDER — METOPROLOL SUCCINATE 25 MG/1
25 TABLET, EXTENDED RELEASE ORAL DAILY
Qty: 90 TABLET | Refills: 1 | Status: SHIPPED | OUTPATIENT
Start: 2023-11-07

## 2023-11-07 RX ORDER — DISULFIRAM 250 MG/1
250 TABLET ORAL DAILY
Qty: 30 TABLET | Refills: 1 | Status: SHIPPED | OUTPATIENT
Start: 2023-11-07

## 2023-11-07 RX ORDER — METOPROLOL SUCCINATE 25 MG/1
1 TABLET, EXTENDED RELEASE ORAL DAILY
COMMUNITY
Start: 2023-10-02 | End: 2023-11-07 | Stop reason: SDUPTHER

## 2023-11-07 RX ORDER — DULOXETIN HYDROCHLORIDE 60 MG/1
60 CAPSULE, DELAYED RELEASE ORAL DAILY
Qty: 90 CAPSULE | Refills: 1 | Status: SHIPPED | OUTPATIENT
Start: 2023-11-07

## 2023-11-07 RX ORDER — OMEPRAZOLE 20 MG/1
20 CAPSULE, DELAYED RELEASE ORAL DAILY
Qty: 90 CAPSULE | Refills: 1 | Status: SHIPPED | OUTPATIENT
Start: 2023-11-07

## 2023-11-07 NOTE — PROGRESS NOTES
Chief Complaint  Abnormal Lab    Subjective          Oscar Avila presents to [facility]  History of Present Illness  Patient is a 62 yo male. He is here for follow up on sobriety of alcohol use. He has been taking antabuse 500 mg daily. He has not been drinking any alcohol.   He is almost out of his medication. Discussed with patient. Will order antabuse 250 mg daily as maintenance dose.  He is currently out of metoprolol. His heart rate is elevated at 117 BPM. He states he has drank 2 cups of coffee this am.       He is requesting a handicap placard. He has COPD and is attending his grandchildren's events and is very short of air after walking.   Filled out handicapped parking  placard for 6 yr. He will see [facility] today.     He is currently on duloxetine 60 mg daily. Tolerating well. Needs refill.   Prazosin 2 mg daily reordering. States he is tolerating well.     Follow up in 4 weeks for recheck.         The following portions of the patient's history were reviewed and updated as appropriate: allergies, current medications, past family history, past medical history, past social history, past surgical history and problem list.    Review of Systems   Constitutional: Negative.    HENT: Negative.     Respiratory:  Positive for cough, chest tightness and wheezing.    Cardiovascular: Negative.    Gastrointestinal: Negative.          Objective   Vital Signs:   /70   Pulse 117   Temp 98.6 °F (37 °C)   Resp 20   Wt 78.1 kg (172 lb 3.2 oz)   SpO2 95%   BMI 24.03 kg/m²    BMI is within normal parameters. No other follow-up for BMI required.      PHQ-2/9 Depression Screening  PHQ-9 Total Score:      SAMUEL-7 Anxiety Screening  SAMUEL-7             Physical Exam  Vitals reviewed.   Constitutional:       Appearance: He is well-developed. He is not ill-appearing.   HENT:      Head: Normocephalic.      Nose: Nose normal.      Mouth/Throat:      Mouth: Mucous membranes are moist.    Eyes:      Conjunctiva/sclera: Conjunctivae normal.      Pupils: Pupils are equal, round, and reactive to light.   Cardiovascular:      Rate and Rhythm: Regular rhythm. Tachycardia present.      Heart sounds: Normal heart sounds.   Pulmonary:      Effort: Pulmonary effort is normal.      Breath sounds: Normal breath sounds.   Abdominal:      General: Bowel sounds are normal.      Palpations: Abdomen is soft.   Musculoskeletal:         General: Normal range of motion.      Cervical back: Normal range of motion.   Lymphadenopathy:      Cervical: No cervical adenopathy.   Skin:     General: Skin is warm and dry.      Capillary Refill: Capillary refill takes less than 2 seconds.   Neurological:      Mental Status: He is alert and oriented to person, place, and time.   Psychiatric:         Mood and Affect: Mood normal.         Speech: Speech normal.         Behavior: Behavior normal. Behavior is cooperative.         Thought Content: Thought content normal.         Judgment: Judgment normal.        Result Review :                 Assessment and Plan    Diagnoses and all orders for this visit:    1. Alcoholism in recovery (Primary)  -     disulfiram (Antabuse) 250 MG tablet; Take 1 tablet by mouth Daily.  Dispense: 30 tablet; Refill: 1    2. Gastroesophageal reflux disease, unspecified whether esophagitis present  -     omeprazole (priLOSEC) 20 MG capsule; Take 1 capsule by mouth Daily.  Dispense: 90 capsule; Refill: 1    3. Current smoker    4. Chronic bronchitis, unspecified chronic bronchitis type    5. Anxiety  -     DULoxetine (CYMBALTA) 60 MG capsule; Take 1 capsule by mouth Daily.  Dispense: 90 capsule; Refill: 1    6. Encounter for medication refill  -     disulfiram (Antabuse) 250 MG tablet; Take 1 tablet by mouth Daily.  Dispense: 30 tablet; Refill: 1  -     DULoxetine (CYMBALTA) 60 MG capsule; Take 1 capsule by mouth Daily.  Dispense: 90 capsule; Refill: 1  -     metoprolol succinate XL (TOPROL-XL) 25 MG 24 hr  tablet; Take 1 tablet by mouth Daily.  Dispense: 90 tablet; Refill: 1  -     omeprazole (priLOSEC) 20 MG capsule; Take 1 capsule by mouth Daily.  Dispense: 90 capsule; Refill: 1  -     prazosin (MINIPRESS) 2 MG capsule; Take 1 capsule by mouth every night at bedtime.  Dispense: 90 capsule; Refill: 1    7. HTN, goal below 140/80  -     metoprolol succinate XL (TOPROL-XL) 25 MG 24 hr tablet; Take 1 tablet by mouth Daily.  Dispense: 90 tablet; Refill: 1  -     prazosin (MINIPRESS) 2 MG capsule; Take 1 capsule by mouth every night at bedtime.  Dispense: 90 capsule; Refill: 1    8. Encounter for smoking cessation counseling    He has not picked up the chantix at this time.   Decreasing his anabuse to a maintenace dose of 250 mg daily.   Follow up in 4 weeks.     Handicapped placard. Assisted patient.   Paperwork completed.   He has questions about testosterone being low. He will need to be referred to urology or endocrinology if he is planning on replacement.     He is agreeable and prefers to wait.           Follow Up   Return in about 4 weeks (around 12/5/2023).  Patient was given instructions and counseling regarding his condition or for health maintenance advice. Please see specific information pulled into the AVS if appropriate.

## 2023-11-20 ENCOUNTER — OFFICE VISIT (OUTPATIENT)
Dept: CARDIOLOGY | Facility: CLINIC | Age: 63
End: 2023-11-20
Payer: MEDICAID

## 2023-11-20 VITALS
HEIGHT: 71 IN | SYSTOLIC BLOOD PRESSURE: 146 MMHG | HEART RATE: 79 BPM | BODY MASS INDEX: 24.95 KG/M2 | OXYGEN SATURATION: 100 % | DIASTOLIC BLOOD PRESSURE: 80 MMHG | WEIGHT: 178.2 LBS

## 2023-11-20 DIAGNOSIS — I25.10 CORONARY ARTERY CALCIFICATION: Primary | ICD-10-CM

## 2023-11-20 DIAGNOSIS — I10 PRIMARY HYPERTENSION: ICD-10-CM

## 2023-11-20 DIAGNOSIS — Z72.0 TOBACCO USE: ICD-10-CM

## 2023-11-20 DIAGNOSIS — I25.84 CORONARY ARTERY CALCIFICATION: Primary | ICD-10-CM

## 2023-11-20 DIAGNOSIS — E78.5 HYPERLIPIDEMIA, UNSPECIFIED HYPERLIPIDEMIA TYPE: ICD-10-CM

## 2023-11-20 RX ORDER — MULTIVITAMIN WITH FOLIC ACID 400 MCG
1 TABLET ORAL DAILY
COMMUNITY
Start: 2023-09-12

## 2023-11-20 RX ORDER — DM/PE/ACETAMINOPHEN/CHLORPHENR 10-5-325-2
1 TABLET, SEQUENTIAL ORAL DAILY
COMMUNITY
Start: 2023-10-02

## 2023-11-20 RX ORDER — FOLIC ACID 1 MG/1
1 TABLET ORAL DAILY
COMMUNITY
Start: 2023-10-02

## 2023-11-20 RX ORDER — NICOTINE 21 MG/24HR
1 PATCH, TRANSDERMAL 24 HOURS TRANSDERMAL EVERY 24 HOURS
Qty: 30 PATCH | Refills: 0 | Status: SHIPPED | OUTPATIENT
Start: 2023-11-20

## 2023-11-20 RX ORDER — FLUTICASONE PROPIONATE 50 MCG
SPRAY, SUSPENSION (ML) NASAL
COMMUNITY
Start: 2023-10-09

## 2023-11-20 RX ORDER — AZELASTINE 1 MG/ML
SPRAY, METERED NASAL AS NEEDED
COMMUNITY
Start: 2023-09-11

## 2023-11-20 RX ORDER — IBUPROFEN 800 MG/1
TABLET ORAL AS NEEDED
COMMUNITY
Start: 2023-09-28

## 2023-11-20 NOTE — PROGRESS NOTES
Follow-up Visit      Date: 2023  Patient Name: Oscar Avila  : 1960   MRN: [id number]     Chief Complaint: Hypertension, dyslipidemia, coronary calcifications    History of Present Illness: Oscar Avila is a 63 y.o. male who is here today for 6-month follow-up.  Since he was last seen patient went to rehab for chronic alcohol use.  He states he was in rehab for 30 days and has now been home for several weeks.  He states he is doing well.  He has some dyspnea with exertion but this is relieved by his albuterol inhaler.  He denies any chest pain, lower extremity edema or orthopnea.  He has no claudication symptoms.  He continues to smoke about a pack a day but states that he is trying to quit.  He was placed on Chantix by his primary care provider but he states that it has made him sick.  While he was in rehab he used a nicotine patch which he states seemed to help him.  Patient's blood pressure is slightly elevated today.  He states that he does not check it on a regular basis at home.    Problem List     1.  Moderate COPD by PFT  2.  Hypertension  3.  Hyperlipidemia  4.  Current smoker,        Cardiac testing:  Stress test 2023       Patient reported GI discomfort and lightheadedness after lexiscan injeciton which resolved in recovery.    No significant ST-T changes from baseline noted.    Diaphragmatic attenuation artifact is present.    Myocardial perfusion imaging indicates a normal myocardial perfusion study with no evidence of ischemia.    Left ventricular ejection fraction is hyperdynamic (Calculated EF > 70%).    Moderate coronary artery calcification present.    Impressions are consistent with a low risk study.     Echo 2023    Left ventricular ejection fraction appears to be 56 - 60%.    The left ventricular cavity is small in size. Left ventricular diastolic function was normal.    No significant valvular stenosis or regurgitation.  Subjective      ROS  Review  of systems was performed and pertinent positives and negatives are noted in the HPI.      Current Outpatient Medications:     acetaminophen (TYLENOL) 650 MG 8 hr tablet, Take 1 tablet by mouth Every 8 (Eight) Hours As Needed for Mild Pain., Disp: 30 tablet, Rfl: 0    albuterol sulfate  (90 Base) MCG/ACT inhaler, Inhale 2 puffs Every 4 (Four) Hours As Needed for Wheezing or Shortness of Air., Disp: 18 g, Rfl: 5    aspirin (Aspirin Low Dose) 81 MG EC tablet, TAKE ONE TABLET BY MOUTH DAILY, Disp: 90 tablet, Rfl: 3    atorvastatin (LIPITOR) 40 MG tablet, TAKE ONE TABLET BY MOUTH ONCE NIGHTLY, Disp: 90 tablet, Rfl: 1    azelastine (ASTELIN) 0.1 % nasal spray, As Needed., Disp: , Rfl:     Cholecalciferol 50 MCG (2000 UT) tablet, Take 1 tablet by mouth Daily for 360 days., Disp: 30 tablet, Rfl: 11    disulfiram (Antabuse) 250 MG tablet, Take 1 tablet by mouth Daily., Disp: 30 tablet, Rfl: 1    docusate sodium (Colace) 100 MG capsule, Take 1 capsule by mouth 2 (Two) Times a Day As Needed for Constipation., Disp: 62 capsule, Rfl: 0    DULoxetine (CYMBALTA) 60 MG capsule, Take 1 capsule by mouth Daily., Disp: 90 capsule, Rfl: 1    fluticasone (FLONASE) 50 MCG/ACT nasal spray, Spray 2 spray into both nostrils once a day, Disp: , Rfl:     Fluticasone-Salmeterol (ADVAIR/WIXELA) 250-50 MCG/ACT DISKUS, Inhale 1 puff 2 (Two) Times a Day., Disp: 1 each, Rfl: 11    folic acid (FOLVITE) 1 MG tablet, Take 1 tablet by mouth Daily., Disp: , Rfl:     GNP Vitamin B-1 100 MG tablet, Take 1 tablet by mouth Daily., Disp: , Rfl:     ibuprofen (ADVIL,MOTRIN) 800 MG tablet, As Needed., Disp: , Rfl:     lisinopril (PRINIVIL,ZESTRIL) 10 MG tablet, Take 1 tablet by mouth Daily., Disp: 90 tablet, Rfl: 1    metoprolol succinate XL (TOPROL-XL) 25 MG 24 hr tablet, Take 1 tablet by mouth Daily., Disp: 90 tablet, Rfl: 1    multivitamin with minerals tablet tablet, Take 1 tablet by mouth Daily., Disp: , Rfl:     multivitamin with minerals tablet  "tablet, Take 1 tablet by mouth Daily., Disp: , Rfl:     omeprazole (priLOSEC) 20 MG capsule, Take 1 capsule by mouth Daily., Disp: 90 capsule, Rfl: 1    prazosin (MINIPRESS) 2 MG capsule, Take 1 capsule by mouth every night at bedtime., Disp: 90 capsule, Rfl: 1    nicotine (NICODERM CQ) 21 MG/24HR patch, Place 1 patch on the skin as directed by provider Daily., Disp: 30 patch, Rfl: 0    varenicline (CHANTIX) 1 MG tablet, Take 1 tablet by mouth 2 (Two) Times a Day., Disp: 60 tablet, Rfl: 2     Allergies   Allergen Reactions    Penicillins GI Intolerance     \" I VOMIT\"        Objective     Vitals:    11/20/23 1050   BP: 146/80   BP Location: Right arm   Patient Position: Sitting   Cuff Size: Adult   Pulse: 79   SpO2: 100%   Weight: 80.8 kg (178 lb 3.2 oz)   Height: 180.3 cm (71\")     Body mass index is 24.85 kg/m².    Physical Exam   Constitutional: Chronically ill-appearing, no acute distress  HENT: Normocephalic, atraumatic moist oral mucosa  Neck: Neck supple. No JVD present. No carotid bruits.   Cardiovascular: Regular rate, regular rhythm and normal heart sounds. No murmur heard.   Pulmonary/Chest: Globally diminished without crackles.  Abdominal: Nondistended  Musculoskeletal: No obvious deformity  Neurological: Alert and oriented x3, no focal deficits  Extremities: No peripheral edema, palpable PT pulses bilaterally    Lab Review:   Lab Results   Component Value Date    GLUCOSE 85 10/24/2023    BUN 7 (L) 10/24/2023    CREATININE 0.96 10/24/2023    EGFRIFNONA 142 11/23/2020    BCR 7.3 10/24/2023    K 4.5 10/24/2023    CO2 25.0 10/24/2023    CALCIUM 10.4 10/24/2023    ALBUMIN 4.0 10/24/2023    AST 20 10/24/2023    ALT 23 10/24/2023     Lab Results   Component Value Date    WBC 13.13 (H) 10/24/2023    HGB 14.6 10/24/2023    HCT 44.1 10/24/2023    MCV 99.8 (H) 10/24/2023     10/24/2023     Lab Results   Component Value Date    CHOL 129 10/24/2023    TRIG 108 10/24/2023    HDL 33 (L) 10/24/2023    LDL 76 " 10/24/2023     Lab Results   Component Value Date    TSH 0.477 10/24/2023     Lab Results   Component Value Date    HGBA1C 5.80 (H) 10/24/2023         Smoking Cessation:   3-10 mintues spent counseling Agreeable to stop    Advance Care Planning   ACP discussion was declined by the patient. Patient does not have an advance directive, declines further assistance.            Assessment / Plan    Assessment:  1. Coronary artery calcification    2. Hyperlipidemia, unspecified hyperlipidemia type    3. Primary hypertension    4. Tobacco use           Plan:  Continue aspirin 81 mg given his coronary calcifications for reducing his risk for heart attack.  Continue atorvastatin 40 mg nightly as his LDL is currently around goal at 76.  With his coronary calcifications and his other risk factors we would like to be aggressive in treatment of his hyperlipidemia.  Continue lisinopril 10 mg daily and metoprolol succinate 25 mg daily for hypertension.  Discussed with the patient he needs to check his blood pressure at home.  If it is regularly above 140/80 then he can contact us and his lisinopril can be increased to 20 mg daily.  Because patient wishes to discontinue smoking we will call him in 30 days worth of nicotine patches before his next follow-up appointment with his primary care provider.    Follow Up    Return in about 1 year (around 11/20/2024) for With CARMEN.    Jeimy Pelaez PA-C

## 2023-12-12 ENCOUNTER — TELEPHONE (OUTPATIENT)
Dept: FAMILY MEDICINE CLINIC | Facility: CLINIC | Age: 63
End: 2023-12-12

## 2023-12-13 ENCOUNTER — OFFICE VISIT (OUTPATIENT)
Dept: FAMILY MEDICINE CLINIC | Facility: CLINIC | Age: 63
End: 2023-12-13
Payer: MEDICAID

## 2023-12-13 VITALS
RESPIRATION RATE: 19 BRPM | SYSTOLIC BLOOD PRESSURE: 106 MMHG | HEIGHT: 71 IN | OXYGEN SATURATION: 95 % | DIASTOLIC BLOOD PRESSURE: 62 MMHG | WEIGHT: 177 LBS | TEMPERATURE: 98 F | BODY MASS INDEX: 24.78 KG/M2 | HEART RATE: 94 BPM

## 2023-12-13 DIAGNOSIS — Z23 IMMUNIZATION DUE: ICD-10-CM

## 2023-12-13 DIAGNOSIS — I10 HYPERTENSION GOAL BP (BLOOD PRESSURE) < 130/80: ICD-10-CM

## 2023-12-13 DIAGNOSIS — Z23 NEED FOR IMMUNIZATION AGAINST INFLUENZA: ICD-10-CM

## 2023-12-13 DIAGNOSIS — J44.9 COPD WITHOUT EXACERBATION: ICD-10-CM

## 2023-12-13 RX ORDER — ALBUTEROL SULFATE 90 UG/1
2 AEROSOL, METERED RESPIRATORY (INHALATION) EVERY 4 HOURS PRN
Qty: 18 G | Refills: 5 | Status: SHIPPED | OUTPATIENT
Start: 2023-12-13

## 2023-12-13 NOTE — PROGRESS NOTES
"Chief Complaint  Hypertension and Dizziness    Subjective          Oscar Avila presents to [facility]  History of Present Illness  Patient is a 64 yo male. He is here for complaint of feeling light headed. His current blood pressure is on the low side of 100/60. He is currently taking his lisinopril and metoprolol xl 25.   Discussed holding the lisinopril. Check blood pressure.   Goal is 130/80.   He denies any chest pain.       Hypertension  This is a chronic problem. The current episode started more than 1 year ago. The problem is controlled. Pertinent negatives include no chest pain, headaches, peripheral edema or shortness of breath. Risk factors for coronary artery disease include smoking/tobacco exposure and male gender. Current antihypertension treatment includes ACE inhibitors, lifestyle changes, alpha 1 blockers and beta blockers. Compliance problems include diet.    Dizziness  Associated symptoms include coughing. Pertinent negatives include no chest pain or headaches.       The following portions of the patient's history were reviewed and updated as appropriate: allergies, current medications, past family history, past medical history, past social history, past surgical history and problem list.    Review of Systems   Constitutional: Negative.    HENT: Negative.     Eyes: Negative.    Respiratory: Negative.  Positive for cough. Negative for shortness of breath.         Occasional cough   Cardiovascular: Negative.  Negative for chest pain.   Gastrointestinal: Negative.    Musculoskeletal: Negative.    Skin: Negative.    Neurological:  Positive for dizziness. Negative for headaches.   Hematological: Negative.    Psychiatric/Behavioral: Negative.  Negative for agitation.          Objective   Vital Signs:   /62   Pulse 94   Temp 98 °F (36.7 °C) (Temporal)   Resp 19   Ht 180.3 cm (70.98\")   Wt 80.3 kg (177 lb)   SpO2 95%   BMI 24.70 kg/m²    BMI is within " normal parameters. No other follow-up for BMI required.          Physical Exam  Vitals reviewed.   HENT:      Head: Normocephalic.      Right Ear: Tympanic membrane, ear canal and external ear normal.      Left Ear: Tympanic membrane, ear canal and external ear normal.      Nose: Nose normal.      Mouth/Throat:      Mouth: Mucous membranes are moist.   Eyes:      Pupils: Pupils are equal, round, and reactive to light.   Cardiovascular:      Rate and Rhythm: Normal rate and regular rhythm.      Pulses: Normal pulses.   Pulmonary:      Effort: Pulmonary effort is normal.   Abdominal:      General: Bowel sounds are normal.      Palpations: Abdomen is soft.   Musculoskeletal:         General: Normal range of motion.      Cervical back: Normal range of motion.   Skin:     General: Skin is warm and dry.      Capillary Refill: Capillary refill takes less than 2 seconds.   Neurological:      Mental Status: He is alert and oriented to person, place, and time.   Psychiatric:         Mood and Affect: Mood normal.        Result Review :                 Assessment and Plan    Diagnoses and all orders for this visit:    1. Hypertension goal BP (blood pressure) < 130/80    2. Immunization due  -     Cancel: Flu Vaccine Quad PF 6-35MO    3. COPD without exacerbation  -     albuterol sulfate  (90 Base) MCG/ACT inhaler; Inhale 2 puffs Every 4 (Four) Hours As Needed for Wheezing or Shortness of Air.  Dispense: 18 g; Refill: 5  Chronic - stable  4. Need for immunization against influenza  -     Fluzone >6 Months ([id number])    Holding the lisinopril. Discussed checking blood pressure and if over 130 systolic to go ahead and take the lisinopril.     Refilling albuterol   He states is still sober.   Follow up in 3 months       Follow Up   Return in about 3 months (around 3/13/2024), or if symptoms worsen or fail to improve.  Patient was given instructions and counseling regarding his condition or for health maintenance advice.  Please see specific information pulled into the AVS if appropriate.

## 2024-01-16 ENCOUNTER — OFFICE VISIT (OUTPATIENT)
Dept: FAMILY MEDICINE CLINIC | Facility: CLINIC | Age: 64
End: 2024-01-16
Payer: MEDICAID

## 2024-01-16 VITALS
HEIGHT: 71 IN | DIASTOLIC BLOOD PRESSURE: 78 MMHG | OXYGEN SATURATION: 94 % | WEIGHT: 179.3 LBS | TEMPERATURE: 98.9 F | BODY MASS INDEX: 25.1 KG/M2 | SYSTOLIC BLOOD PRESSURE: 132 MMHG | HEART RATE: 98 BPM

## 2024-01-16 DIAGNOSIS — F10.21 ALCOHOLISM IN RECOVERY: ICD-10-CM

## 2024-01-16 DIAGNOSIS — F17.200 CURRENT SMOKER: ICD-10-CM

## 2024-01-16 DIAGNOSIS — J44.9 COPD WITHOUT EXACERBATION: ICD-10-CM

## 2024-01-16 DIAGNOSIS — I10 PRIMARY HYPERTENSION: Primary | ICD-10-CM

## 2024-01-16 DIAGNOSIS — I10 HTN, GOAL BELOW 140/80: ICD-10-CM

## 2024-01-16 DIAGNOSIS — Z76.0 ENCOUNTER FOR MEDICATION REFILL: ICD-10-CM

## 2024-01-16 DIAGNOSIS — Z71.6 ENCOUNTER FOR SMOKING CESSATION COUNSELING: ICD-10-CM

## 2024-01-16 PROCEDURE — 99214 OFFICE O/P EST MOD 30 MIN: CPT | Performed by: NURSE PRACTITIONER

## 2024-01-16 PROCEDURE — 3078F DIAST BP <80 MM HG: CPT | Performed by: NURSE PRACTITIONER

## 2024-01-16 PROCEDURE — 1159F MED LIST DOCD IN RCRD: CPT | Performed by: NURSE PRACTITIONER

## 2024-01-16 PROCEDURE — 1160F RVW MEDS BY RX/DR IN RCRD: CPT | Performed by: NURSE PRACTITIONER

## 2024-01-16 PROCEDURE — 3075F SYST BP GE 130 - 139MM HG: CPT | Performed by: NURSE PRACTITIONER

## 2024-01-16 RX ORDER — NICOTINE 21 MG/24HR
1 PATCH, TRANSDERMAL 24 HOURS TRANSDERMAL EVERY 24 HOURS
Qty: 28 PATCH | Refills: 0 | Status: SHIPPED | OUTPATIENT
Start: 2024-01-16 | End: 2024-02-13

## 2024-01-16 RX ORDER — FLUTICASONE PROPIONATE AND SALMETEROL 250; 50 UG/1; UG/1
1 POWDER RESPIRATORY (INHALATION)
Qty: 1 EACH | Refills: 11 | Status: SHIPPED | OUTPATIENT
Start: 2024-01-16

## 2024-01-16 NOTE — PROGRESS NOTES
"Chief Complaint  Hypertension goal BP (blood pressure) < 130/80 (F/u)    Subjective          Oscar Avila presents to [facility]  History of Present Illness  Patient is a 64 yo male. He is here for follow-up for hypertension, COPD, smoking cessation.   His blood pressure has improved since stopping the lisinopril and metoprolol. Is is currently 132/78. He denies any chest pain or leg swelling.   He has COPD and is \"doing ok\" on his current meds. He is wearing a 21 mg nicoderm patch. And has decreased smoking.   He has remained - to be in recovery from alcohol use.     Discussed parameters to restart the metoprolol. Sys greater than 140 mm/hg, greater than 80 diastolic, and hr over 100. He is agreeable.     Follow up in 3 months and as needed.           The following portions of the patient's history were reviewed and updated as appropriate: allergies, current medications, past family history, past medical history, past social history, past surgical history and problem list.    Review of Systems   Constitutional: Negative.    HENT: Negative.     Respiratory:  Positive for cough. Negative for shortness of breath and wheezing.    Cardiovascular: Negative.    Gastrointestinal: Negative.    Genitourinary: Negative.    Musculoskeletal:  Positive for arthralgias.   Skin: Negative.    Hematological: Negative.    Psychiatric/Behavioral: Negative.           Objective   Vital Signs:   /78 (BP Location: Left arm, Patient Position: Sitting, Cuff Size: Adult)   Pulse 98   Temp 98.9 °F (37.2 °C) (Infrared)   Ht 180.3 cm (70.98\")   Wt 81.3 kg (179 lb 4.8 oz)   SpO2 94%   BMI 25.02 kg/m²           PHQ-2/9 Depression Screening  PHQ-9 Total Score:      SAMUEL-7 Anxiety Screening  SAMUEL-7             Physical Exam  Vitals reviewed.   HENT:      Nose: Nose normal.   Eyes:      Pupils: Pupils are equal, round, and reactive to light.   Cardiovascular:      Rate and Rhythm: Normal rate and " regular rhythm.      Pulses: Normal pulses.      Heart sounds: Normal heart sounds.   Pulmonary:      Effort: Pulmonary effort is normal.      Breath sounds: Normal breath sounds.   Abdominal:      Palpations: Abdomen is soft.   Skin:     General: Skin is warm and dry.      Capillary Refill: Capillary refill takes less than 2 seconds.   Neurological:      Mental Status: He is alert.   Psychiatric:         Mood and Affect: Mood normal.         Behavior: Behavior normal.        Result Review :                 Assessment and Plan    Diagnoses and all orders for this visit:    1. Primary hypertension (Primary)    2. COPD without exacerbation  -     Fluticasone-Salmeterol (ADVAIR/WIXELA) 250-50 MCG/ACT DISKUS; Inhale 1 puff 2 (Two) Times a Day.  Dispense: 1 each; Refill: 11    3. Encounter for smoking cessation counseling  -     nicotine (Nicoderm CQ) 14 MG/24HR patch; Place 1 patch on the skin as directed by provider Daily for 28 days.  Dispense: 28 patch; Refill: 0  -     nicotine (Nicoderm CQ) 7 MG/24HR patch; Place 1 patch on the skin as directed by provider Daily.  Dispense: 28 patch; Refill: 0    4. Current smoker  -     nicotine (Nicoderm CQ) 14 MG/24HR patch; Place 1 patch on the skin as directed by provider Daily for 28 days.  Dispense: 28 patch; Refill: 0  -     nicotine (Nicoderm CQ) 7 MG/24HR patch; Place 1 patch on the skin as directed by provider Daily.  Dispense: 28 patch; Refill: 0    5. HTN, goal below 140/80    6. Encounter for medication refill    7. Alcoholism in recovery    Follow up in  3 months and as needed.         Follow Up   Return HTN and COPD.  Patient was given instructions and counseling regarding his condition or for health maintenance advice. Please see specific information pulled into the AVS if appropriate.

## 2024-01-17 ENCOUNTER — OFFICE VISIT (OUTPATIENT)
Dept: SLEEP MEDICINE | Facility: HOSPITAL | Age: 64
End: 2024-01-17
Payer: MEDICAID

## 2024-01-17 VITALS
SYSTOLIC BLOOD PRESSURE: 157 MMHG | BODY MASS INDEX: 24.89 KG/M2 | HEART RATE: 110 BPM | DIASTOLIC BLOOD PRESSURE: 70 MMHG | HEIGHT: 72 IN | OXYGEN SATURATION: 94 % | WEIGHT: 183.8 LBS

## 2024-01-17 DIAGNOSIS — G47.33 OBSTRUCTIVE SLEEP APNEA, ADULT: ICD-10-CM

## 2024-01-17 DIAGNOSIS — R06.83 SNORING: Primary | ICD-10-CM

## 2024-01-17 DIAGNOSIS — G47.19 EXCESSIVE DAYTIME SLEEPINESS: ICD-10-CM

## 2024-01-17 PROBLEM — U07.1 COVID-19: Status: RESOLVED | Noted: 2023-03-15 | Resolved: 2024-01-17

## 2024-01-17 PROBLEM — K63.5 POLYP OF COLON: Status: ACTIVE | Noted: 2022-02-01

## 2024-01-17 NOTE — PROGRESS NOTES
Oscar Avila is a 63 y.o. male.   Chief Complaint   Patient presents with    Sleeping Problem       HPI     63 y.o. male seen in consultation at the request of Aicha Maldonado APRN for evaluation of the above.     He has been told by his wife for years that he snores.  He said he pretty much ignored that but recently was in alcohol rehab and his roommates complained of his snoring.    He thinks he averages around 8 hours of sleep per night.  He does recall awakening fairly frequently.    He does admit to being intermittently somnolent.  He will take naps on some days.    He has been noted to talk in his sleep and move a lot but does not relate any other specific reported parasomnias.    [place] Scale is: 9/24    The patient's relevant past medical, surgical, family, and social history reviewed and updated in Epic as appropriate.    Current medications are:   Current Outpatient Medications:     acetaminophen (TYLENOL) 650 MG 8 hr tablet, Take 1 tablet by mouth Every 8 (Eight) Hours As Needed for Mild Pain., Disp: 30 tablet, Rfl: 0    albuterol sulfate  (90 Base) MCG/ACT inhaler, Inhale 2 puffs Every 4 (Four) Hours As Needed for Wheezing or Shortness of Air., Disp: 18 g, Rfl: 5    aspirin (Aspirin Low Dose) 81 MG EC tablet, TAKE ONE TABLET BY MOUTH DAILY, Disp: 90 tablet, Rfl: 3    atorvastatin (LIPITOR) 40 MG tablet, TAKE ONE TABLET BY MOUTH ONCE NIGHTLY, Disp: 90 tablet, Rfl: 1    azelastine (ASTELIN) 0.1 % nasal spray, As Needed., Disp: , Rfl:     Cholecalciferol 50 MCG (2000 UT) tablet, Take 1 tablet by mouth Daily for 360 days., Disp: 30 tablet, Rfl: 11    docusate sodium (Colace) 100 MG capsule, Take 1 capsule by mouth 2 (Two) Times a Day As Needed for Constipation., Disp: 62 capsule, Rfl: 0    DULoxetine (CYMBALTA) 60 MG capsule, Take 1 capsule by mouth Daily., Disp: 90 capsule, Rfl: 1    fluticasone (FLONASE) 50 MCG/ACT nasal spray, Spray 2 spray into both nostrils once a day, Disp: , Rfl:     " Fluticasone-Salmeterol (ADVAIR/WIXELA) 250-50 MCG/ACT DISKUS, Inhale 1 puff 2 (Two) Times a Day., Disp: 1 each, Rfl: 11    folic acid (FOLVITE) 1 MG tablet, Take 1 tablet by mouth Daily., Disp: , Rfl:     GNP Vitamin B-1 100 MG tablet, Take 1 tablet by mouth Daily., Disp: , Rfl:     ibuprofen (ADVIL,MOTRIN) 800 MG tablet, As Needed., Disp: , Rfl:     metoprolol succinate XL (TOPROL-XL) 25 MG 24 hr tablet, Take 1 tablet by mouth Daily., Disp: 90 tablet, Rfl: 1    multivitamin with minerals tablet tablet, Take 1 tablet by mouth Daily., Disp: , Rfl:     multivitamin with minerals tablet tablet, Take 1 tablet by mouth Daily., Disp: , Rfl:     nicotine (Nicoderm CQ) 14 MG/24HR patch, Place 1 patch on the skin as directed by provider Daily for 28 days., Disp: 28 patch, Rfl: 0    [START ON 2/13/2024] nicotine (Nicoderm CQ) 7 MG/24HR patch, Place 1 patch on the skin as directed by provider Daily., Disp: 28 patch, Rfl: 0    omeprazole (priLOSEC) 20 MG capsule, Take 1 capsule by mouth Daily., Disp: 90 capsule, Rfl: 1    prazosin (MINIPRESS) 2 MG capsule, Take 1 capsule by mouth every night at bedtime., Disp: 90 capsule, Rfl: 1.    Review of Systems    Review of Systems  ROS documented in patient questionnaire ×14 systems.  Reviewed with patient.  Otherwise negative except as noted in HPI.    Physical Exam    Blood pressure 157/70, pulse 110, height 182.9 cm (72\"), weight 83.4 kg (183 lb 12.8 oz), SpO2 94%. Body mass index is 24.93 kg/m².    Physical Exam  Vitals and nursing note reviewed.   Constitutional:       Appearance: Normal appearance. He is well-developed.   HENT:      Head: Normocephalic and atraumatic.      Nose: Nose normal.      Mouth/Throat:      Mouth: Mucous membranes are moist.      Pharynx: Oropharynx is clear. No oropharyngeal exudate.      Comments: Edentulous  Class IV airway  Eyes:      General: No scleral icterus.     Conjunctiva/sclera: Conjunctivae normal.   Neck:      Thyroid: No thyromegaly.      " Trachea: No tracheal deviation.   Cardiovascular:      Rate and Rhythm: Normal rate and regular rhythm.      Heart sounds: No murmur heard.     No friction rub. No gallop.   Pulmonary:      Effort: Pulmonary effort is normal. No respiratory distress.      Breath sounds: No wheezing or rales.   Musculoskeletal:         General: No deformity. Normal range of motion.   Skin:     General: Skin is warm and dry.      Findings: No rash.   Neurological:      Mental Status: He is alert and oriented to person, place, and time.   Psychiatric:         Behavior: Behavior normal.         Thought Content: Thought content normal.         DATA:    Reviewed 10/24/2023 note from DEONDRE Ward    Reviewed 10/24/2023 labs including CBC, CMP, and TSH    ASSESSMENT:    Problem List Items Addressed This Visit    None  Visit Diagnoses       Snoring    -  Primary    Relevant Orders    Home Sleep Study    Obstructive sleep apnea, adult        Relevant Orders    Home Sleep Study    Excessive daytime sleepiness        Relevant Orders    Home Sleep Study            63-year-old male who presents to the sleep center with reports of loud snoring as well as nonrestorative sleep/daytime somnolence.  He has a class IV airway putting him at risk for the presence of obstructive sleep apnea.  Medical comorbidities include hypertension.  Based upon this he warrants further workup for the presence of obstructive sleep apnea.  I discussed this process with him as well as the importance of diagnosis and treatment of this condition.  I went over the diagnostic process and treatment options with him as outlined below and he is agreeable to proceed.    PLAN:    Home sleep apnea testing.  The diagnostic process as well as potential treatment options for obstructive sleep apnea were discussed.  The cardiovascular and metabolic risks of untreated obstructive sleep apnea were reviewed.  The patient was amenable to a trial of CPAP therapy if deemed appropriate  after testing complete.  Close sleep center follow-up.      I have reviewed the results of my evaluation and impression and discussed my recommendations in detail with the patient.    Signed by  Emiliano Pascual MD    January 17, 2024      CC: Aicha Maldonado APRN Cox, Tammy Rena, APRN

## 2024-03-05 ENCOUNTER — HOSPITAL ENCOUNTER (OUTPATIENT)
Dept: SLEEP MEDICINE | Facility: HOSPITAL | Age: 64
Discharge: HOME | End: 2024-03-05
Admitting: INTERNAL MEDICINE
Payer: MEDICAID

## 2024-03-05 VITALS — HEIGHT: 72 IN | WEIGHT: 183 LBS | BODY MASS INDEX: 24.79 KG/M2

## 2024-03-05 DIAGNOSIS — G47.33 OBSTRUCTIVE SLEEP APNEA, ADULT: ICD-10-CM

## 2024-03-05 DIAGNOSIS — R06.83 SNORING: ICD-10-CM

## 2024-03-05 DIAGNOSIS — G47.19 EXCESSIVE DAYTIME SLEEPINESS: ICD-10-CM

## 2024-03-05 PROCEDURE — 95806 SLEEP STUDY UNATT&RESP EFFT: CPT

## 2024-03-07 DIAGNOSIS — G47.33 OSA (OBSTRUCTIVE SLEEP APNEA): Primary | ICD-10-CM

## 2024-04-10 ENCOUNTER — TELEPHONE (OUTPATIENT)
Dept: FAMILY MEDICINE CLINIC | Facility: CLINIC | Age: 64
End: 2024-04-10

## 2024-04-12 DIAGNOSIS — E78.5 HYPERLIPIDEMIA, UNSPECIFIED HYPERLIPIDEMIA TYPE: ICD-10-CM

## 2024-04-12 RX ORDER — ATORVASTATIN CALCIUM 40 MG/1
40 TABLET, FILM COATED ORAL NIGHTLY
Qty: 90 TABLET | Refills: 3 | Status: SHIPPED | OUTPATIENT
Start: 2024-04-12

## 2024-04-30 ENCOUNTER — OFFICE VISIT (OUTPATIENT)
Dept: FAMILY MEDICINE CLINIC | Facility: CLINIC | Age: 64
End: 2024-04-30
Payer: MEDICAID

## 2024-04-30 ENCOUNTER — LAB (OUTPATIENT)
Dept: LAB | Facility: HOSPITAL | Age: 64
End: 2024-04-30
Payer: MEDICAID

## 2024-04-30 VITALS
SYSTOLIC BLOOD PRESSURE: 132 MMHG | BODY MASS INDEX: 24.54 KG/M2 | RESPIRATION RATE: 19 BRPM | WEIGHT: 181.2 LBS | OXYGEN SATURATION: 96 % | DIASTOLIC BLOOD PRESSURE: 60 MMHG | HEIGHT: 72 IN | TEMPERATURE: 98.9 F | HEART RATE: 97 BPM

## 2024-04-30 DIAGNOSIS — J44.9 COPD WITHOUT EXACERBATION: ICD-10-CM

## 2024-04-30 DIAGNOSIS — K21.9 GASTROESOPHAGEAL REFLUX DISEASE, UNSPECIFIED WHETHER ESOPHAGITIS PRESENT: ICD-10-CM

## 2024-04-30 DIAGNOSIS — E78.2 MIXED HYPERLIPIDEMIA: ICD-10-CM

## 2024-04-30 DIAGNOSIS — I10 PRIMARY HYPERTENSION: ICD-10-CM

## 2024-04-30 DIAGNOSIS — Z76.0 ENCOUNTER FOR MEDICATION REFILL: ICD-10-CM

## 2024-04-30 DIAGNOSIS — Z12.2 ENCOUNTER FOR SCREENING FOR LUNG CANCER: Primary | ICD-10-CM

## 2024-04-30 DIAGNOSIS — R73.03 PRE-DIABETES: ICD-10-CM

## 2024-04-30 DIAGNOSIS — G47.33 OSA (OBSTRUCTIVE SLEEP APNEA): ICD-10-CM

## 2024-04-30 DIAGNOSIS — I10 HTN, GOAL BELOW 140/80: ICD-10-CM

## 2024-04-30 DIAGNOSIS — F17.200 CURRENT SMOKER: ICD-10-CM

## 2024-04-30 DIAGNOSIS — F41.9 ANXIETY: ICD-10-CM

## 2024-04-30 DIAGNOSIS — J43.9 PULMONARY EMPHYSEMA, UNSPECIFIED EMPHYSEMA TYPE: Chronic | ICD-10-CM

## 2024-04-30 LAB
ALBUMIN SERPL-MCNC: 4.2 G/DL (ref 3.5–5.2)
ALBUMIN/GLOB SERPL: 1.5 G/DL
ALP SERPL-CCNC: 122 U/L (ref 39–117)
ALT SERPL W P-5'-P-CCNC: 11 U/L (ref 1–41)
ANION GAP SERPL CALCULATED.3IONS-SCNC: 10.4 MMOL/L (ref 5–15)
AST SERPL-CCNC: 20 U/L (ref 1–40)
BILIRUB SERPL-MCNC: 0.4 MG/DL (ref 0–1.2)
BUN SERPL-MCNC: 11 MG/DL (ref 8–23)
BUN/CREAT SERPL: 9.8 (ref 7–25)
CALCIUM SPEC-SCNC: 9.5 MG/DL (ref 8.6–10.5)
CHLORIDE SERPL-SCNC: 103 MMOL/L (ref 98–107)
CO2 SERPL-SCNC: 23.6 MMOL/L (ref 22–29)
CREAT SERPL-MCNC: 1.12 MG/DL (ref 0.76–1.27)
EGFRCR SERPLBLD CKD-EPI 2021: 73.4 ML/MIN/1.73
GLOBULIN UR ELPH-MCNC: 2.8 GM/DL
GLUCOSE SERPL-MCNC: 72 MG/DL (ref 65–99)
POTASSIUM SERPL-SCNC: 4.7 MMOL/L (ref 3.5–5.2)
PROT SERPL-MCNC: 7 G/DL (ref 6–8.5)
SODIUM SERPL-SCNC: 137 MMOL/L (ref 136–145)

## 2024-04-30 PROCEDURE — 80053 COMPREHEN METABOLIC PANEL: CPT

## 2024-04-30 PROCEDURE — 1160F RVW MEDS BY RX/DR IN RCRD: CPT | Performed by: NURSE PRACTITIONER

## 2024-04-30 PROCEDURE — 99396 PREV VISIT EST AGE 40-64: CPT | Performed by: NURSE PRACTITIONER

## 2024-04-30 PROCEDURE — 83036 HEMOGLOBIN GLYCOSYLATED A1C: CPT

## 2024-04-30 PROCEDURE — 3078F DIAST BP <80 MM HG: CPT | Performed by: NURSE PRACTITIONER

## 2024-04-30 PROCEDURE — 1159F MED LIST DOCD IN RCRD: CPT | Performed by: NURSE PRACTITIONER

## 2024-04-30 PROCEDURE — 3075F SYST BP GE 130 - 139MM HG: CPT | Performed by: NURSE PRACTITIONER

## 2024-04-30 RX ORDER — LISINOPRIL 10 MG/1
10 TABLET ORAL DAILY
Qty: 90 TABLET | Refills: 3 | Status: SHIPPED | OUTPATIENT
Start: 2024-04-30

## 2024-04-30 RX ORDER — LISINOPRIL 10 MG/1
10 TABLET ORAL DAILY
COMMUNITY
Start: 2024-04-07 | End: 2024-04-30 | Stop reason: SDUPTHER

## 2024-04-30 RX ORDER — ALBUTEROL SULFATE 90 UG/1
2 AEROSOL, METERED RESPIRATORY (INHALATION) EVERY 4 HOURS PRN
Qty: 18 G | Refills: 5 | Status: SHIPPED | OUTPATIENT
Start: 2024-04-30

## 2024-04-30 RX ORDER — OMEPRAZOLE 20 MG/1
20 CAPSULE, DELAYED RELEASE ORAL DAILY
Qty: 90 CAPSULE | Refills: 1 | Status: SHIPPED | OUTPATIENT
Start: 2024-04-30

## 2024-04-30 RX ORDER — DULOXETIN HYDROCHLORIDE 60 MG/1
60 CAPSULE, DELAYED RELEASE ORAL DAILY
Qty: 90 CAPSULE | Refills: 1 | Status: SHIPPED | OUTPATIENT
Start: 2024-04-30

## 2024-04-30 RX ORDER — METOPROLOL SUCCINATE 25 MG/1
25 TABLET, EXTENDED RELEASE ORAL DAILY
Qty: 90 TABLET | Refills: 1 | Status: SHIPPED | OUTPATIENT
Start: 2024-04-30

## 2024-04-30 RX ORDER — PRAZOSIN HYDROCHLORIDE 2 MG/1
2 CAPSULE ORAL
Qty: 90 CAPSULE | Refills: 1 | Status: SHIPPED | OUTPATIENT
Start: 2024-04-30

## 2024-04-30 NOTE — PROGRESS NOTES
"Chief Complaint  Annual Exam    Subjective          Oscar Avila presents to [facility]  History of Present Illness  Patient is a 62 yo male. He is here for annual exam with fasting labs. He has HTN, HLD, prediabetes, CAD, COPD, ANGELA -moderate, current smoker, recovery from alcohol. He states he is feeling better. His has tried to stop smoking. He tried nicotine patches. \"It made me smoke more\". He denies any chest pain or a increase in his shortness of breat. He does have COPD.   He denies any headache, nausea, vomiting, and muscle aches. He follows with Dr. Shine Cardiologist and Dr. Pascual- Pulmonologist.      Patient complains of shortness of breath and cough. With his COPD no increase in symptoms. Using his inhalers.   Patient is here for monitoring of chronic issues due to need for monitoring of renal function, liver function, and side effects.           The following portions of the patient's history were reviewed and updated as appropriate: allergies, current medications, past family history, past medical history, past social history, past surgical history and problem list.    Review of Systems   Constitutional:  Positive for fatigue.   HENT: Negative.     Eyes:         Wears glasses - up to date -    Respiratory:  Positive for cough, chest tightness and shortness of breath.         Chronic with COPD   Cardiovascular: Negative.  Negative for chest pain, palpitations and leg swelling.   Gastrointestinal: Negative.  Negative for abdominal distention, diarrhea, nausea and vomiting.   Genitourinary:  Positive for urgency.   Musculoskeletal:  Positive for arthralgias.   Skin: Negative.    Allergic/Immunologic: Negative.    Neurological: Negative.    Hematological: Negative.    Psychiatric/Behavioral: Negative.           Objective   Vital Signs:   /60   Pulse 97   Temp 98.9 °F (37.2 °C) (Temporal)   Resp 19   Ht 182.9 cm (72.01\")   Wt 82.2 kg (181 lb 3.2 " oz)   SpO2 96%   BMI 24.57 kg/m²    BMI is within normal parameters. No other follow-up for BMI required.      PHQ-2/9 Depression Screening  PHQ-9 Total Score: 0    SAMUEL-7 Anxiety Screening  SAMUEL-7  Feeling nervous, anxious or on edge: Not at all  Not being able to stop or control worrying: Not at all  Worrying too much about different things: Not at all  Trouble Relaxing: Not at all  Being so restless that it is hard to sit still: Not at all  Feeling afraid as if something awful might happen: Not at all  Becoming easily annoyed or irritable: Not at all  SAMUEL 7 Total Score: 0  If you checked any problems, how difficult have these problems made it for you to do your work, take care of things at home, or get along with other people: Not difficult at all          Physical Exam  Vitals reviewed.   HENT:      Head: Normocephalic.      Right Ear: Tympanic membrane, ear canal and external ear normal.      Left Ear: Tympanic membrane, ear canal and external ear normal.      Nose: Nose normal.      Mouth/Throat:      Mouth: Mucous membranes are moist.   Eyes:      Pupils: Pupils are equal, round, and reactive to light.   Cardiovascular:      Rate and Rhythm: Normal rate and regular rhythm.      Pulses: Normal pulses.      Heart sounds: Normal heart sounds.   Pulmonary:      Effort: Pulmonary effort is normal.      Breath sounds: No wheezing.   Abdominal:      General: Bowel sounds are normal.      Palpations: Abdomen is soft.   Genitourinary:     Comments: Pt declined a  exam.   Musculoskeletal:         General: Normal range of motion.      Right lower leg: No edema.      Left lower leg: No edema.   Skin:     General: Skin is warm and dry.      Capillary Refill: Capillary refill takes less than 2 seconds.   Neurological:      Mental Status: He is alert and oriented to person, place, and time.   Psychiatric:         Mood and Affect: Mood normal.         Thought Content: Thought content normal.         Judgment: Judgment  normal.        Result Review :            Hemoglobin A1c (04/30/2024 10:23)  CBC & Differential (10/24/2023 09:05)  Comprehensive Metabolic Panel (10/24/2023 09:05)  Hemoglobin A1c (10/24/2023 09:05)  Lipid Panel (10/24/2023 09:05)  TSH Rfx On Abnormal To Free T4 (10/24/2023 09:05)  Urinalysis With Culture If Indicated - Urine, Clean Catch (10/24/2023 09:05)     Assessment and Plan    Diagnoses and all orders for this visit:    1. Encounter for screening for lung cancer (Primary)  -     CT Chest Low Dose Wo; Future    2. COPD without exacerbation  -     albuterol sulfate  (90 Base) MCG/ACT inhaler; Inhale 2 puffs Every 4 (Four) Hours As Needed for Wheezing or Shortness of Air.  Dispense: 18 g; Refill: 5    3. Anxiety  -     DULoxetine (CYMBALTA) 60 MG capsule; Take 1 capsule by mouth Daily.  Dispense: 90 capsule; Refill: 1    4. Encounter for medication refill  -     DULoxetine (CYMBALTA) 60 MG capsule; Take 1 capsule by mouth Daily.  Dispense: 90 capsule; Refill: 1  -     metoprolol succinate XL (TOPROL-XL) 25 MG 24 hr tablet; Take 1 tablet by mouth Daily.  Dispense: 90 tablet; Refill: 1  -     omeprazole (priLOSEC) 20 MG capsule; Take 1 capsule by mouth Daily.  Dispense: 90 capsule; Refill: 1  -     prazosin (MINIPRESS) 2 MG capsule; Take 1 capsule by mouth every night at bedtime.  Dispense: 90 capsule; Refill: 1    5. HTN, goal below 140/80  -     lisinopril (PRINIVIL,ZESTRIL) 10 MG tablet; Take 1 tablet by mouth Daily.  Dispense: 90 tablet; Refill: 3  -     metoprolol succinate XL (TOPROL-XL) 25 MG 24 hr tablet; Take 1 tablet by mouth Daily.  Dispense: 90 tablet; Refill: 1  -     prazosin (MINIPRESS) 2 MG capsule; Take 1 capsule by mouth every night at bedtime.  Dispense: 90 capsule; Refill: 1    6. Gastroesophageal reflux disease, unspecified whether esophagitis present  -     omeprazole (priLOSEC) 20 MG capsule; Take 1 capsule by mouth Daily.  Dispense: 90 capsule; Refill: 1  -     Comprehensive  Metabolic Panel; Future    7. Pre-diabetes  -     Hemoglobin A1c; Future    8. Pulmonary emphysema, unspecified emphysema type    9. Primary hypertension    10. Mixed hyperlipidemia    11. Current smoker    12. ANGELA (obstructive sleep apnea)      Discussed following up with sleep medicine regarding his equipment for CPAP.        The patient is here for a health maintenance visit.  Currently, the patient consumes a healthy diet and has an adequate exercise regimen. Screening lab work is ordered.  Immunizations are updated today.  Advice and education is given regarding nutrition, aerobic exercise, routine dental evaluations, routine eye exams, reproductive health, cardiovascular risk reduction, sunscreen use, self-skin examination (annual dermatology evaluations) and seat belt use (general overall safety).  Further recommendations after lab evaluation.  Annual wellness evaluations recommended.     Nutrition: Stressed importance of moderation in sodium/caffeine intake, saturated fat and cholesterol, caloric balance, sufficient intake of fresh fruits, vegetables, fiber, calcium, iron, and 1 mg of folate supplement per day (for females capable of pregnancy)., Exercise: Stressed the importance of regular exercise. , Discussed safety belts, safety helmets, smoke detector, smoking near bedding or upholstery. , and Immunizations reviewed.    Discussed the nature of the medical condition(s) risks, complications, implications, and management, safe and proper use of medications. Encouraged medication compliance, and keeping scheduled follow up appointments with me and any other providers.       Follow Up   Return in about 3 months (around 7/30/2024).  Patient was given instructions and counseling regarding his condition or for health maintenance advice. Please see specific information pulled into the AVS if appropriate.

## 2024-05-01 LAB — HBA1C MFR BLD: 5.8 % (ref 4.8–5.6)

## 2024-05-23 ENCOUNTER — HOSPITAL ENCOUNTER (OUTPATIENT)
Dept: CT IMAGING | Facility: HOSPITAL | Age: 64
Discharge: HOME | End: 2024-05-23
Admitting: NURSE PRACTITIONER
Payer: MEDICAID

## 2024-05-23 DIAGNOSIS — Z12.2 ENCOUNTER FOR SCREENING FOR LUNG CANCER: ICD-10-CM

## 2024-05-23 PROCEDURE — 71271 CT THORAX LUNG CANCER SCR C-: CPT

## 2024-07-30 ENCOUNTER — OFFICE VISIT (OUTPATIENT)
Dept: FAMILY MEDICINE CLINIC | Facility: CLINIC | Age: 64
End: 2024-07-30
Payer: MEDICAID

## 2024-07-30 VITALS
WEIGHT: 190.45 LBS | RESPIRATION RATE: 20 BRPM | TEMPERATURE: 98.9 F | HEIGHT: 72 IN | DIASTOLIC BLOOD PRESSURE: 66 MMHG | OXYGEN SATURATION: 94 % | HEART RATE: 89 BPM | BODY MASS INDEX: 25.8 KG/M2 | SYSTOLIC BLOOD PRESSURE: 134 MMHG

## 2024-07-30 DIAGNOSIS — Z13.6 SCREENING FOR AAA (ABDOMINAL AORTIC ANEURYSM): ICD-10-CM

## 2024-07-30 DIAGNOSIS — R73.03 PREDIABETES: ICD-10-CM

## 2024-07-30 DIAGNOSIS — J44.9 CHRONIC OBSTRUCTIVE PULMONARY DISEASE, UNSPECIFIED COPD TYPE: Chronic | ICD-10-CM

## 2024-07-30 DIAGNOSIS — E78.5 HYPERLIPIDEMIA, UNSPECIFIED HYPERLIPIDEMIA TYPE: Primary | ICD-10-CM

## 2024-07-30 LAB
EXPIRATION DATE: NORMAL
HBA1C MFR BLD: 5.7 % (ref 4.5–5.7)
Lab: NORMAL

## 2024-07-30 PROCEDURE — 3078F DIAST BP <80 MM HG: CPT | Performed by: STUDENT IN AN ORGANIZED HEALTH CARE EDUCATION/TRAINING PROGRAM

## 2024-07-30 PROCEDURE — 83036 HEMOGLOBIN GLYCOSYLATED A1C: CPT | Performed by: STUDENT IN AN ORGANIZED HEALTH CARE EDUCATION/TRAINING PROGRAM

## 2024-07-30 PROCEDURE — 1126F AMNT PAIN NOTED NONE PRSNT: CPT | Performed by: STUDENT IN AN ORGANIZED HEALTH CARE EDUCATION/TRAINING PROGRAM

## 2024-07-30 PROCEDURE — 3044F HG A1C LEVEL LT 7.0%: CPT | Performed by: STUDENT IN AN ORGANIZED HEALTH CARE EDUCATION/TRAINING PROGRAM

## 2024-07-30 PROCEDURE — 3075F SYST BP GE 130 - 139MM HG: CPT | Performed by: STUDENT IN AN ORGANIZED HEALTH CARE EDUCATION/TRAINING PROGRAM

## 2024-07-30 PROCEDURE — 99214 OFFICE O/P EST MOD 30 MIN: CPT | Performed by: STUDENT IN AN ORGANIZED HEALTH CARE EDUCATION/TRAINING PROGRAM

## 2024-07-30 NOTE — PROGRESS NOTES
"Chief Complaint  Establish Care (Offboarding mak muse)    History of Present Illness      Copd  He says the advair helps on a daily basis and rarely takes the albuterol.       Hld  Tolerating  lipitor.       Htn  He reports taking both bp meds in the morning metoprolol and lisinopril as well as prazosin at night.       Anxiety  Tolerating cymbalta for anxiety , stable      gerd  He feels the ppi controls heartburn      He has no complaints today.         The following portions of the patient's history were reviewed and updated as appropriate: allergies, current medications, past family history, past medical history, past social history, past surgical history, and problem list.    OBJECTIVE:  /66   Pulse 89   Temp 98.9 °F (37.2 °C) (Temporal)   Resp 20   Ht 182.9 cm (72.01\")   Wt 86.4 kg (190 lb 7.2 oz)   SpO2 94%   BMI 25.82 kg/m²       Physical Exam  Constitutional:       General: He is not in acute distress.     Appearance: Normal appearance.   HENT:      Head: Normocephalic and atraumatic.   Eyes:      Extraocular Movements: Extraocular movements intact.   Cardiovascular:      Rate and Rhythm: Normal rate and regular rhythm.      Heart sounds: No murmur heard.  Pulmonary:      Effort: Pulmonary effort is normal. No respiratory distress.      Breath sounds: Normal breath sounds. No stridor. No wheezing, rhonchi or rales.   Skin:     Findings: No rash.   Neurological:      General: No focal deficit present.      Mental Status: He is alert.   Psychiatric:         Mood and Affect: Mood normal.                    Assessment and Plan   Diagnoses and all orders for this visit:    1. Hyperlipidemia, unspecified hyperlipidemia type (Primary)    2. Chronic obstructive pulmonary disease, unspecified COPD type    3. Prediabetes  -     POC Glycosylated Hemoglobin (Hb A1C)    4. Screening for AAA (abdominal aortic aneurysm)  -     [place] aaa screen limited; Future    Other orders  -     nicotine polacrilex (Nicotine " Mini) 4 MG lozenge; Dissolve 1 lozenge in the mouth As Needed for Smoking Cessation.  Dispense: 90 lozenge; Refill: 11        Continue current meds.   Asked him to bring meds to next visit.     Discussed with patient that PPI can be associated with decreased absorption leading to osteoporosis, vitamin deficiencies, increased risk for infection.   Advised daily multivitamin with calcium and vitamin d.       Tobacco use  Counseled on cessation.   He will continue with the patch. He has been working to quit, about a pack and a half per day. Add in nicotine lozenge.     Return in about 3 months (around 10/30/2024).       Concepción Quigley D.O.  [facility] [place] [facility] [place]

## 2024-07-30 NOTE — PATIENT INSTRUCTIONS
The Tobacco Use Log can help patients to identify activities or situations that trigger the desire to smoke or use other forms of tobacco. It is important for patients to understand these environmental cues so that they can develop coping strategies to overcome the temptation to use tobacco. Clinicians can use this information to suggest alternative behaviors to increase the likelihood of a successful quit attempt. The log is most appropriate for patients who are preparing for a quit attempt, but it can be used with any patient who wants to learn more about his or her smoking behavior.      Instructions for use:   The Tobacco Use Log is a piece of paper that is kept with the patient’s tobacco. It can be folded and wrapped around the cigarette pack or can of snuff with a rubber band. Alternatively, patients may keep the log in their wallet or day planner. It is important that the log be readily available at the times when the patient uses the tobacco. Through careful documentation of tobacco use over a period of several days, patient-specific tobacco usage patterns become evident.         1. Instruct the patient to continue his or her regular tobacco use for a period of at least three days (including one non-work day). It is preferable to complete the Tobacco Use Log for seven consecutive days , because usage patterns might fluctuate as a function of the day of the week (e.g. weekends vs. work days). The patient should not attempt to reduce his or her tobacco use during this time. The intent is to document current tobacco use habits and patterns, so that the patient can understand the triggers and situations associated with his or her tobacco use.         2. The following information should be noted in the Tobacco Use Log each time tobacco is used:    - Time of day (indicate AM or PM)    - Description of the activity/situation at the time of tobacco use (e.g., were others present?)    - Need rating of the patient’s  perceived importance of using tobacco, at that time, using the following scale:     Not very important   (would not have missed it)  1   Moderately important  2 Very important  (would have missed it a great deal)  3        3. The patient should use a separate log sheet each day. Note: Heavy tobacco users will require more than one log sheet per day.           4. Just prior to the quit date, review the Tobacco Use Log with the patient to identify specific situations that trigger tobacco use. Additionally, discuss specific cognitive and behavioral strategies to prevent relapse.     Tobacco Use Log for (date): ___/___/___     Time  Describe the situation/activity of the tobacco use Need rating  [place] 1 number     1.   1 2 3   2   1 2 3   3.   1 2 3   4.   1 2 3   5.   1 2 3   6.   1 2 3   7.   1 2 3   8.   1 2 3   9.   1 2 3   10.   1 2 3   11.   1 2 3   12.   1 2 3   13.   1 2 3   14.   1 2 3   15.   1 2 3   16.   1 2 3   17.   1 2 3   18.   1 2 3   19.   1 2 3   20.   1 2 3     *Need RATING: Rate the importance of your need to use tobacco for each instance of use -- based on the following scale:    Not very important   (would not have missed it)  1   Moderately important  2 Very important  (would have missed it a great deal)  3

## 2024-07-31 DIAGNOSIS — I25.10 CORONARY ARTERY DISEASE INVOLVING NATIVE CORONARY ARTERY OF NATIVE HEART, UNSPECIFIED WHETHER ANGINA PRESENT: ICD-10-CM

## 2024-07-31 RX ORDER — ASPIRIN 81 MG/1
81 TABLET, COATED ORAL DAILY
Qty: 90 TABLET | Refills: 3 | OUTPATIENT
Start: 2024-07-31

## 2024-07-31 RX ORDER — ASPIRIN 81 MG/1
81 TABLET ORAL DAILY
Qty: 90 TABLET | Refills: 3 | Status: SHIPPED | OUTPATIENT
Start: 2024-07-31

## 2024-09-05 NOTE — TELEPHONE ENCOUNTER
Caller: Oscar Avila    Relationship: Self    Best call back number:      Requested Prescriptions:   Requested Prescriptions     Pending Prescriptions Disp Refills    nicotine polacrilex (Nicotine Mini) 4 MG lozenge 90 lozenge 11     Sig: Dissolve 1 lozenge in the mouth As Needed for Smoking Cessation.        Pharmacy where request should be sent: [facility] [id number] [address] - [phone]  - [phone]     Last office visit with prescribing clinician: 7/30/2024   Last telemedicine visit with prescribing clinician: Visit date not found   Next office visit with prescribing clinician: 11/5/2024     Additional details provided by patient: PATIENT ADVISED HE IS OUT OF THIS MEDICATION ; IT IS ALSO WRITTEN AS PRN AND PATIENT NEEDS TO TAKE THIS MORE THAN ONCE A DAY; HE ADVISED PHARMACY SAID THEY CAN'T DISPENSE PRESCRIPTION IT WAS A 81 DAY PRESCRIPTION AS IT IS WAS WRITTEN FOR AS IT ONE PILL DAILY; HE ADVISED THIS IS THE BEST THING WORKING FOR HIM TO GET OFF THE NICTOTINE      Does the patient have less than a 3 day supply:  [x] Yes  [] No      Roxy Mcgregor Rep   09/05/24 12:46 EDT

## 2024-10-25 DIAGNOSIS — I10 HTN, GOAL BELOW 140/80: ICD-10-CM

## 2024-10-25 DIAGNOSIS — Z76.0 ENCOUNTER FOR MEDICATION REFILL: ICD-10-CM

## 2024-10-25 DIAGNOSIS — F41.9 ANXIETY: ICD-10-CM

## 2024-10-25 RX ORDER — METOPROLOL SUCCINATE 25 MG/1
25 TABLET, EXTENDED RELEASE ORAL DAILY
Qty: 90 TABLET | Refills: 1 | Status: SHIPPED | OUTPATIENT
Start: 2024-10-25

## 2024-10-25 RX ORDER — DULOXETIN HYDROCHLORIDE 60 MG/1
60 CAPSULE, DELAYED RELEASE ORAL DAILY
Qty: 90 CAPSULE | Refills: 1 | Status: SHIPPED | OUTPATIENT
Start: 2024-10-25

## 2024-10-29 DIAGNOSIS — I10 HTN, GOAL BELOW 140/80: ICD-10-CM

## 2024-10-29 DIAGNOSIS — K21.9 GASTROESOPHAGEAL REFLUX DISEASE, UNSPECIFIED WHETHER ESOPHAGITIS PRESENT: ICD-10-CM

## 2024-10-29 DIAGNOSIS — Z76.0 ENCOUNTER FOR MEDICATION REFILL: ICD-10-CM

## 2024-10-29 RX ORDER — PRAZOSIN HYDROCHLORIDE 2 MG/1
2 CAPSULE ORAL
Qty: 90 CAPSULE | Refills: 1 | Status: SHIPPED | OUTPATIENT
Start: 2024-10-29

## 2024-10-29 NOTE — TELEPHONE ENCOUNTER
Rx Refill Note  Requested Prescriptions     Pending Prescriptions Disp Refills    omeprazole (priLOSEC) 20 MG capsule 90 capsule 1     Sig: Take 1 capsule by mouth Daily.    prazosin (MINIPRESS) 2 MG capsule 90 capsule 1     Sig: Take 1 capsule by mouth every night at bedtime.      Last office visit with prescribing clinician: 7/30/2024   Last telemedicine visit with prescribing clinician: Visit date not found   Next office visit with prescribing clinician: 11/5/2024                         Would you like a call back once the refill request has been completed: [] Yes [] No    If the office needs to give you a call back, can they leave a voicemail: [] Yes [] No    Kayleigh Lutz MA  10/29/24, 13:16 EDT

## 2024-11-05 ENCOUNTER — TELEPHONE (OUTPATIENT)
Dept: FAMILY MEDICINE CLINIC | Facility: CLINIC | Age: 64
End: 2024-11-05

## 2024-11-05 NOTE — TELEPHONE ENCOUNTER
Caller: Oscar Avila Ovidio    Relationship: Self    Best call back number: [phone]     Requested Prescriptions:   Requested Prescriptions     Pending Prescriptions Disp Refills    nicotine polacrilex (Nicotine Mini) 4 MG lozenge 90 lozenge 11     Sig: Dissolve 1 lozenge in the mouth 3 (Three) Times a Day As Needed for Smoking Cessation.        Pharmacy where request should be sent: [facility] [id number] [address] - [phone] [facility] [phone]      Last office visit with prescribing clinician: 7/30/2024   Last telemedicine visit with prescribing clinician: Visit date not found   Next office visit with prescribing clinician: Visit date not found     Additional details provided by patient: PATIENT STATED PHARMACY WOULD NOT REFILL PREVIOUS PRESCRIPTION.     Does the patient have less than a 3 day supply:  [x] Yes  [] No    Would you like a call back once the refill request has been completed: [] Yes [x] No    If the office needs to give you a call back, can they leave a voicemail: [] Yes [x] No    Roxy Ward Rep   11/05/24 15:55 EST

## 2024-11-05 NOTE — TELEPHONE ENCOUNTER
Caller: Oscar Avila    Relationship: Self    Best call back number: [phone]     What is the best time to reach you: ANY    Who are you requesting to speak with (clinical staff, provider,  specific staff member): REFERRAL COORDINATOR    Do you know the name of the person who called: SELF    What was the call regarding: PATIENT STATED HE IS ATTEMPTING TO GET A REFERRAL SCHEDULED BUT EVERY TIME HE IS SENT TO SCHEDULING IT ISN'T WORKING.    Is it okay if the provider responds through MyChart: NO

## 2024-11-06 NOTE — TELEPHONE ENCOUNTER
HUB TO READ      LM for pt on number provided to find out what he is trying to get scheduled as he did not have any recent referrals placed.

## 2025-02-14 DIAGNOSIS — J44.9 COPD WITHOUT EXACERBATION: ICD-10-CM

## 2025-02-14 RX ORDER — FLUTICASONE PROPIONATE AND SALMETEROL 250; 50 UG/1; UG/1
1 POWDER RESPIRATORY (INHALATION)
Qty: 1 EACH | Refills: 11 | Status: SHIPPED | OUTPATIENT
Start: 2025-02-14

## 2025-04-10 DIAGNOSIS — E78.5 HYPERLIPIDEMIA, UNSPECIFIED HYPERLIPIDEMIA TYPE: ICD-10-CM

## 2025-04-10 RX ORDER — ATORVASTATIN CALCIUM 40 MG/1
40 TABLET, FILM COATED ORAL NIGHTLY
Qty: 30 TABLET | Refills: 0 | Status: SHIPPED | OUTPATIENT
Start: 2025-04-10

## 2025-04-25 DIAGNOSIS — F41.9 ANXIETY: ICD-10-CM

## 2025-04-25 DIAGNOSIS — I10 HTN, GOAL BELOW 140/80: ICD-10-CM

## 2025-04-25 DIAGNOSIS — Z76.0 ENCOUNTER FOR MEDICATION REFILL: ICD-10-CM

## 2025-04-25 RX ORDER — METOPROLOL SUCCINATE 25 MG/1
25 TABLET, EXTENDED RELEASE ORAL DAILY
Qty: 90 TABLET | Refills: 1 | Status: SHIPPED | OUTPATIENT
Start: 2025-04-25

## 2025-04-25 RX ORDER — DULOXETIN HYDROCHLORIDE 60 MG/1
60 CAPSULE, DELAYED RELEASE ORAL DAILY
Qty: 90 CAPSULE | Refills: 1 | Status: SHIPPED | OUTPATIENT
Start: 2025-04-25

## 2025-05-01 DIAGNOSIS — K21.9 GASTROESOPHAGEAL REFLUX DISEASE, UNSPECIFIED WHETHER ESOPHAGITIS PRESENT: ICD-10-CM

## 2025-05-01 DIAGNOSIS — Z76.0 ENCOUNTER FOR MEDICATION REFILL: ICD-10-CM

## 2025-05-01 DIAGNOSIS — I10 HTN, GOAL BELOW 140/80: ICD-10-CM

## 2025-05-01 RX ORDER — PRAZOSIN HYDROCHLORIDE 2 MG/1
2 CAPSULE ORAL
Qty: 90 CAPSULE | Refills: 1 | Status: SHIPPED | OUTPATIENT
Start: 2025-05-01

## 2025-05-01 RX ORDER — OMEPRAZOLE 20 MG/1
20 CAPSULE, DELAYED RELEASE ORAL DAILY
Qty: 90 CAPSULE | Refills: 1 | Status: SHIPPED | OUTPATIENT
Start: 2025-05-01

## 2025-05-11 DIAGNOSIS — E78.5 HYPERLIPIDEMIA, UNSPECIFIED HYPERLIPIDEMIA TYPE: ICD-10-CM

## 2025-05-12 RX ORDER — ATORVASTATIN CALCIUM 40 MG/1
40 TABLET, FILM COATED ORAL NIGHTLY
Qty: 30 TABLET | Refills: 0 | OUTPATIENT
Start: 2025-05-12

## 2025-05-23 NOTE — PROGRESS NOTES
[facility] Cardiology  Follow Up Visit  Oscar Avila  1960    VISIT DATE:  05/27/25    PCP:   Concepción Quigley DO  [address] [address]          CC:  Coronary artery disease involving native coronary artery of      Problem List:  1.  Moderate COPD by PFT  2.  Hypertension  3.  Hyperlipidemia  4.  Current smoker        Cardiac testing:  Stress test April 2023       Patient reported GI discomfort and lightheadedness after lexiscan injeciton which resolved in recovery.    No significant ST-T changes from baseline noted.    Diaphragmatic attenuation artifact is present.    Myocardial perfusion imaging indicates a normal myocardial perfusion study with no evidence of ischemia.    Left ventricular ejection fraction is hyperdynamic (Calculated EF > 70%).    Moderate coronary artery calcification present.    Impressions are consistent with a low risk study.     Echo April 2023    Left ventricular ejection fraction appears to be 56 - 60%.    The left ventricular cavity is small in size. Left ventricular diastolic function was normal.    No significant valvular stenosis or regurgitation.         History of Present Illness:  Oscar Avila  Is a 64 y.o. male with pertinent cardiac history detailed above.  Patient does have some dyspnea on exertion which appears related to his COPD.  He is still smoking.  He is using nicotine replacement therapy to help quit.  BP is well-controlled.  Has not had lipids since October 2023.  He is fasting today in preparation having labs performed.  EKG is sinus rhythm with no abnormalities.  Discussed obtaining AAA screening ultrasound.      Patient Active Problem List    Diagnosis Date Noted    ANGELA (obstructive sleep apnea) 04/30/2024     Note Last Updated: 4/30/2024 04/2024 needs cpap       Primary hypertension 11/20/2023    Alcoholism in recovery 11/07/2023    Current smoker 10/24/2023     Note Last Updated: 10/24/2023     10/24/23  "restarting chantix      Coronary artery calcification 03/27/2023    Emphysema, unspecified 03/27/2023    Hyperlipidemia 03/15/2023    COPD (chronic obstructive pulmonary disease) 01/17/2023     Note Last Updated: 4/23/2023 04/2023 PFT 60-69% Moderate COPD       Polyp of colon 02/01/2022     Note Last Updated: 1/17/2024     feb 2022 polyp (3yr f/u)         Allergies   Allergen Reactions    Penicillins GI Intolerance     \" I VOMIT\"        Social History     Socioeconomic History    Marital status:    Tobacco Use    Smoking status: Every Day     Current packs/day: 1.00     Average packs/day: 1 pack/day for 49.4 years (49.4 ttl pk-yrs)     Types: Cigarettes     Start date: 1976     Passive exposure: Past    Smokeless tobacco: Never    Tobacco comments:     Trying to quit smoking. Would like to go back on the nicotine patch from PCP   Vaping Use    Vaping status: Former    Substances: Nicotine    Devices: Disposable, Pre-filled or refillable cartridge   Substance and Sexual Activity    Alcohol use: Not Currently     Comment: Quit drinking. Started Revive Program    Drug use: Not Currently     Comment: alcohol    Sexual activity: Yes     Partners: Female       Family History   Problem Relation Age of Onset    Cancer Mother         small cell cancer    Cancer Father         colon cancer       Current Medications:    Current Outpatient Medications:     albuterol sulfate  (90 Base) MCG/ACT inhaler, Inhale 2 puffs Every 4 (Four) Hours As Needed for Wheezing or Shortness of Air., Disp: 18 g, Rfl: 5    aspirin (Aspirin Low Dose) 81 MG EC tablet, Take 1 tablet by mouth Daily., Disp: 90 tablet, Rfl: 3    atorvastatin (LIPITOR) 40 MG tablet, Take 1 tablet by mouth Every Night. PATIENT NEEDS UPDATED LABS PRIOR TO ADDITIONAL REFILLS, Disp: 30 tablet, Rfl: 0    azelastine (ASTELIN) 0.1 % nasal spray, As Needed., Disp: , Rfl:     docusate sodium (Colace) 100 MG capsule, Take 1 capsule by mouth 2 (Two) Times a Day As " "Needed for Constipation., Disp: 62 capsule, Rfl: 0    DULoxetine (CYMBALTA) 60 MG capsule, Take 1 capsule by mouth Daily., Disp: 90 capsule, Rfl: 1    fluticasone (FLONASE) 50 MCG/ACT nasal spray, Spray 2 spray into both nostrils once a day, Disp: , Rfl:     Fluticasone-Salmeterol (ADVAIR/WIXELA) 250-50 MCG/ACT DISKUS, Inhale 1 puff 2 (Two) Times a Day., Disp: 1 each, Rfl: 11    GNP Vitamin B-1 100 MG tablet, Take 1 tablet by mouth Daily., Disp: , Rfl:     ibuprofen (ADVIL,MOTRIN) 800 MG tablet, As Needed., Disp: , Rfl:     lisinopril (PRINIVIL,ZESTRIL) 10 MG tablet, Take 1 tablet by mouth Daily., Disp: 90 tablet, Rfl: 3    metoprolol succinate XL (TOPROL-XL) 25 MG 24 hr tablet, Take 1 tablet by mouth Daily., Disp: 90 tablet, Rfl: 1    multivitamin with minerals tablet tablet, Take 1 tablet by mouth Daily., Disp: , Rfl:     nicotine (Nicoderm CQ) 7 MG/24HR patch, Place 1 patch on the skin as directed by provider Daily., Disp: 28 patch, Rfl: 0    nicotine polacrilex (Nicotine Mini) 4 MG lozenge, Dissolve 1 lozenge in the mouth 3 (Three) Times a Day As Needed for Smoking Cessation., Disp: 90 lozenge, Rfl: 2    omeprazole (priLOSEC) 20 MG capsule, TAKE 1 CAPSULE BY MOUTH DAILY, Disp: 90 capsule, Rfl: 1    prazosin (MINIPRESS) 2 MG capsule, TAKE 1 CAPSULE BY MOUTH EVERY NIGHT AT BEDTIME, Disp: 90 capsule, Rfl: 1    acetaminophen (TYLENOL) 650 MG 8 hr tablet, Take 1 tablet by mouth Every 8 (Eight) Hours As Needed for Mild Pain. (Patient not taking: Reported on 5/27/2025), Disp: 30 tablet, Rfl: 0    folic acid (FOLVITE) 1 MG tablet, Take 1 tablet by mouth Daily. (Patient not taking: Reported on 5/27/2025), Disp: , Rfl:      Review of Systems   Cardiovascular: Negative.    Respiratory:  Positive for shortness of breath.        Vitals:    05/27/25 1014   BP: 122/64   BP Location: Right arm   Patient Position: Sitting   Cuff Size: Adult   Pulse: 82   SpO2: 96%   Weight: 82.2 kg (181 lb 3.2 oz)   Height: 182.9 cm (72\") "       Physical Exam  Constitutional:       Appearance: Normal appearance.   Neck:      Vascular: No carotid bruit.   Cardiovascular:      Rate and Rhythm: Normal rate and regular rhythm.      Pulses: Normal pulses.      Heart sounds: Normal heart sounds.   Pulmonary:      Comments: Coarse bilaterally  Musculoskeletal:      Right lower leg: No edema.      Left lower leg: No edema.   Neurological:      Mental Status: He is alert.         Diagnostic Data:    ECG 12 Lead    Date/Time: 5/27/2025 10:47 AM  Performed by: Reuben Shine MD    Authorized by: Reuben Shine MD  Comparison: compared with previous ECG from 5/2/2023  Rhythm: sinus rhythm  Rate: normal  BPM: 82  QRS axis: normal    Clinical impression: normal ECG        Lab Results   Component Value Date    TRIG 108 10/24/2023    HDL 33 (L) 10/24/2023     Lab Results   Component Value Date    GLUCOSE 72 04/30/2024    BUN 11 04/30/2024    CREATININE 1.12 04/30/2024     04/30/2024    K 4.7 04/30/2024     04/30/2024    CO2 23.6 04/30/2024     Lab Results   Component Value Date    HGBA1C 5.7 07/30/2024     Lab Results   Component Value Date    WBC 13.13 (H) 10/24/2023    HGB 14.6 10/24/2023    HCT 44.1 10/24/2023     10/24/2023       Assessment:   Diagnosis Plan   1. Hyperlipidemia, unspecified hyperlipidemia type  ECG 12 Lead    Lipid Panel    Comprehensive Metabolic Panel    CBC (No Diff)    Hemoglobin A1c    Lipoprotein A (LPA)      2. Abnormal finding of blood chemistry, unspecified  Hemoglobin A1c      3. Tobacco use  US aaa screen limited            Plan:     Dyspnea  -Appears pulmonary in nature, has COPD  -Continue inhaler regimen  -Within normal limit EKG     2.  Hypertension  -Controlled on lisinopril, no changes made     3.  Hyperlipidemia  Last LDL was 76.  Due for repeat lipids  Lipitor 40 mg  - Repeating lipids today     4.  Tobacco abuse  -uses nicotine patches and gum  -1 ppd smoker  -plan AAA U/S, this has been  ordered.  He is 64.     5.  Nonobstructive CAD  -Coronary artery calcifications present, no ischemia on stress  -Continue aspirin.    -repeating labs today    ACP discussion was held with the patient during this visit. Patient does not have an advance directive, declines further assistance.      Reuben Shine MD formerly [facility]

## 2025-05-27 ENCOUNTER — LAB (OUTPATIENT)
Dept: LAB | Facility: HOSPITAL | Age: 65
End: 2025-05-27
Payer: MEDICARE

## 2025-05-27 ENCOUNTER — TELEPHONE (OUTPATIENT)
Dept: CARDIOLOGY | Facility: CLINIC | Age: 65
End: 2025-05-27

## 2025-05-27 ENCOUNTER — OFFICE VISIT (OUTPATIENT)
Dept: CARDIOLOGY | Facility: CLINIC | Age: 65
End: 2025-05-27
Payer: MEDICARE

## 2025-05-27 VITALS
HEIGHT: 72 IN | BODY MASS INDEX: 24.54 KG/M2 | SYSTOLIC BLOOD PRESSURE: 122 MMHG | DIASTOLIC BLOOD PRESSURE: 64 MMHG | HEART RATE: 82 BPM | WEIGHT: 181.2 LBS | OXYGEN SATURATION: 96 %

## 2025-05-27 DIAGNOSIS — E78.5 HYPERLIPIDEMIA, UNSPECIFIED HYPERLIPIDEMIA TYPE: ICD-10-CM

## 2025-05-27 DIAGNOSIS — R79.9 ABNORMAL FINDING OF BLOOD CHEMISTRY, UNSPECIFIED: ICD-10-CM

## 2025-05-27 DIAGNOSIS — Z72.0 TOBACCO USE: ICD-10-CM

## 2025-05-27 DIAGNOSIS — E78.5 HYPERLIPIDEMIA, UNSPECIFIED HYPERLIPIDEMIA TYPE: Primary | ICD-10-CM

## 2025-05-27 LAB
ALBUMIN SERPL-MCNC: 4.2 G/DL (ref 3.5–5.2)
ALBUMIN/GLOB SERPL: 1.5 G/DL
ALP SERPL-CCNC: 111 U/L (ref 39–117)
ALT SERPL W P-5'-P-CCNC: 17 U/L (ref 1–41)
ANION GAP SERPL CALCULATED.3IONS-SCNC: 12.1 MMOL/L (ref 5–15)
AST SERPL-CCNC: 21 U/L (ref 1–40)
BILIRUB SERPL-MCNC: 0.3 MG/DL (ref 0–1.2)
BUN SERPL-MCNC: 23 MG/DL (ref 8–23)
BUN/CREAT SERPL: 17.8 (ref 7–25)
CALCIUM SPEC-SCNC: 9.1 MG/DL (ref 8.6–10.5)
CHLORIDE SERPL-SCNC: 105 MMOL/L (ref 98–107)
CHOLEST SERPL-MCNC: 109 MG/DL (ref 0–200)
CO2 SERPL-SCNC: 21.9 MMOL/L (ref 22–29)
CREAT SERPL-MCNC: 1.29 MG/DL (ref 0.76–1.27)
DEPRECATED RDW RBC AUTO: 47.1 FL (ref 37–54)
EGFRCR SERPLBLD CKD-EPI 2021: 61.5 ML/MIN/1.73
ERYTHROCYTE [DISTWIDTH] IN BLOOD BY AUTOMATED COUNT: 12.3 % (ref 12.3–15.4)
GLOBULIN UR ELPH-MCNC: 2.8 GM/DL
GLUCOSE SERPL-MCNC: 86 MG/DL (ref 65–99)
HBA1C MFR BLD: 5.7 % (ref 4.8–5.6)
HCT VFR BLD AUTO: 41.9 % (ref 37.5–51)
HDLC SERPL-MCNC: 46 MG/DL (ref 40–60)
HGB BLD-MCNC: 14 G/DL (ref 13–17.7)
LDLC SERPL CALC-MCNC: 51 MG/DL (ref 0–100)
LDLC/HDLC SERPL: 1.16 {RATIO}
MCH RBC QN AUTO: 34.4 PG (ref 26.6–33)
MCHC RBC AUTO-ENTMCNC: 33.4 G/DL (ref 31.5–35.7)
MCV RBC AUTO: 102.9 FL (ref 79–97)
PLATELET # BLD AUTO: 320 10*3/MM3 (ref 140–450)
PMV BLD AUTO: 10 FL (ref 6–12)
POTASSIUM SERPL-SCNC: 5.5 MMOL/L (ref 3.5–5.2)
PROT SERPL-MCNC: 7 G/DL (ref 6–8.5)
RBC # BLD AUTO: 4.07 10*6/MM3 (ref 4.14–5.8)
SODIUM SERPL-SCNC: 139 MMOL/L (ref 136–145)
TRIGL SERPL-MCNC: 49 MG/DL (ref 0–150)
VLDLC SERPL-MCNC: 12 MG/DL (ref 5–40)
WBC NRBC COR # BLD AUTO: 12.65 10*3/MM3 (ref 3.4–10.8)

## 2025-05-27 PROCEDURE — 3074F SYST BP LT 130 MM HG: CPT | Performed by: INTERNAL MEDICINE

## 2025-05-27 PROCEDURE — 3078F DIAST BP <80 MM HG: CPT | Performed by: INTERNAL MEDICINE

## 2025-05-27 PROCEDURE — 83695 ASSAY OF LIPOPROTEIN(A): CPT

## 2025-05-27 PROCEDURE — 99214 OFFICE O/P EST MOD 30 MIN: CPT | Performed by: INTERNAL MEDICINE

## 2025-05-27 PROCEDURE — 83036 HEMOGLOBIN GLYCOSYLATED A1C: CPT

## 2025-05-27 PROCEDURE — 93000 ELECTROCARDIOGRAM COMPLETE: CPT | Performed by: INTERNAL MEDICINE

## 2025-05-27 PROCEDURE — 36415 COLL VENOUS BLD VENIPUNCTURE: CPT

## 2025-05-27 PROCEDURE — 80061 LIPID PANEL: CPT

## 2025-05-27 PROCEDURE — 80053 COMPREHEN METABOLIC PANEL: CPT

## 2025-05-27 PROCEDURE — 85027 COMPLETE CBC AUTOMATED: CPT

## 2025-05-27 NOTE — TELEPHONE ENCOUNTER
Caller: Oscar Avila    Relationship to patient: Self    Best call back number: [phone]     Requested date: ASAP     Additional notes: PT WENT TO GET LAB WORK TODAY PER , THEY TOLD HIM HIS INSURANCE WAS OUT OF NETWORK. HE GOT THE BLOOD WORK. HE IS WONDERING WHAT THE CONFUSION IS. IT SHOWS THAT WE DO TAKE [facility] MEDICARE ADVANTAGE PPO, THIS IS WHAT THE PT HAS.

## 2025-05-27 NOTE — TELEPHONE ENCOUNTER
Called and spoke w/ patient. Let him know we are out of network with his [facility] PPO plan, but because it is a PPO, he may have out of network benefits. He would have to call his insurance to see how much/what they will cover. He stated the lab did draw his blood and he wanted to know how much it would cost him for the appt w/ [facility] and his lab work. Call was transferred to the estimates dept to discuss.

## 2025-05-28 LAB — LPA SERPL-SCNC: 14.3 NMOL/L

## 2025-06-13 DIAGNOSIS — E78.5 HYPERLIPIDEMIA, UNSPECIFIED HYPERLIPIDEMIA TYPE: ICD-10-CM

## 2025-06-13 RX ORDER — ATORVASTATIN CALCIUM 40 MG/1
40 TABLET, FILM COATED ORAL NIGHTLY
Qty: 30 TABLET | Refills: 5 | Status: SHIPPED | OUTPATIENT
Start: 2025-06-13

## 2025-06-24 ENCOUNTER — HOSPITAL ENCOUNTER (EMERGENCY)
Facility: HOSPITAL | Age: 65
Discharge: HOME | DRG: 871 | End: 2025-06-24
Attending: EMERGENCY MEDICINE | Admitting: EMERGENCY MEDICINE
Payer: MEDICARE

## 2025-06-24 ENCOUNTER — APPOINTMENT (OUTPATIENT)
Dept: CT IMAGING | Facility: HOSPITAL | Age: 65
DRG: 871 | End: 2025-06-24
Payer: MEDICARE

## 2025-06-24 VITALS
RESPIRATION RATE: 16 BRPM | WEIGHT: 178 LBS | SYSTOLIC BLOOD PRESSURE: 119 MMHG | OXYGEN SATURATION: 93 % | DIASTOLIC BLOOD PRESSURE: 68 MMHG | BODY MASS INDEX: 24.92 KG/M2 | TEMPERATURE: 98.5 F | HEART RATE: 113 BPM | HEIGHT: 71 IN
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
## 2025-06-24 DIAGNOSIS — J18.9 PNEUMONIA OF RIGHT LOWER LOBE DUE TO INFECTIOUS ORGANISM: ICD-10-CM

## 2025-06-24 DIAGNOSIS — S32.010D COMPRESSION FRACTURE OF L1 VERTEBRA WITH ROUTINE HEALING, SUBSEQUENT ENCOUNTER: ICD-10-CM

## 2025-06-24 DIAGNOSIS — J44.9 CHRONIC OBSTRUCTIVE PULMONARY DISEASE, UNSPECIFIED COPD TYPE: Chronic | ICD-10-CM

## 2025-06-24 DIAGNOSIS — F10.11 HISTORY OF ALCOHOL ABUSE: ICD-10-CM

## 2025-06-24 DIAGNOSIS — F17.200 CURRENT SMOKER: ICD-10-CM

## 2025-06-24 PROCEDURE — 70450 CT HEAD/BRAIN W/O DYE: CPT

## 2025-06-24 PROCEDURE — 72128 CT CHEST SPINE W/O DYE: CPT

## 2025-06-24 PROCEDURE — 72125 CT NECK SPINE W/O DYE: CPT

## 2025-06-24 PROCEDURE — 99284 EMERGENCY DEPT VISIT MOD MDM: CPT

## 2025-06-24 RX ORDER — ACETAMINOPHEN 500 MG
500 TABLET ORAL EVERY 6 HOURS PRN
Qty: 20 TABLET | Refills: 0 | Status: ON HOLD | OUTPATIENT
Start: 2025-06-24 | End: 2025-06-29

## 2025-06-24 RX ORDER — DIAZEPAM 5 MG/1
5 TABLET ORAL ONCE
Status: COMPLETED | OUTPATIENT
Start: 2025-06-24 | End: 2025-06-24

## 2025-06-24 RX ORDER — DOXYCYCLINE 100 MG/1
100 CAPSULE ORAL ONCE
Status: COMPLETED | OUTPATIENT
Start: 2025-06-24 | End: 2025-06-24

## 2025-06-24 RX ORDER — ACETAMINOPHEN 500 MG
1000 TABLET ORAL ONCE
Status: COMPLETED | OUTPATIENT
Start: 2025-06-24 | End: 2025-06-24

## 2025-06-24 RX ADMIN — ACETAMINOPHEN 1000 MG: 500 TABLET ORAL at 23:44

## 2025-06-24 RX ADMIN — DIAZEPAM 5 MG: 5 TABLET ORAL at 23:44

## 2025-06-24 RX ADMIN — DOXYCYCLINE 100 MG: 100 CAPSULE ORAL at 23:44

## 2025-06-25 ENCOUNTER — APPOINTMENT (OUTPATIENT)
Dept: GENERAL RADIOLOGY | Facility: HOSPITAL | Age: 65
End: 2025-06-25
Payer: MEDICARE

## 2025-06-25 ENCOUNTER — APPOINTMENT (OUTPATIENT)
Dept: CT IMAGING | Facility: HOSPITAL | Age: 65
End: 2025-06-25
Payer: MEDICARE

## 2025-06-25 ENCOUNTER — HOSPITAL ENCOUNTER (INPATIENT)
Facility: HOSPITAL | Age: 65
LOS: 8 days | Discharge: HOME HEALTH | End: 2025-07-03
Attending: EMERGENCY MEDICINE | Admitting: FAMILY MEDICINE
Payer: MEDICARE

## 2025-06-25 ENCOUNTER — TELEPHONE (OUTPATIENT)
Dept: FAMILY MEDICINE CLINIC | Facility: CLINIC | Age: 65
End: 2025-06-25

## 2025-06-25 DIAGNOSIS — E87.1 HYPONATREMIA: ICD-10-CM

## 2025-06-25 DIAGNOSIS — D72.829 LEUKOCYTOSIS, UNSPECIFIED TYPE: ICD-10-CM

## 2025-06-25 DIAGNOSIS — G47.33 OSA (OBSTRUCTIVE SLEEP APNEA): ICD-10-CM

## 2025-06-25 DIAGNOSIS — R09.02 HYPOXIA: ICD-10-CM

## 2025-06-25 DIAGNOSIS — J43.8 OTHER EMPHYSEMA: ICD-10-CM

## 2025-06-25 DIAGNOSIS — R06.02 SOB (SHORTNESS OF BREATH): Primary | ICD-10-CM

## 2025-06-25 DIAGNOSIS — J18.9 PNEUMONIA OF BOTH LOWER LOBES DUE TO INFECTIOUS ORGANISM: ICD-10-CM

## 2025-06-25 DIAGNOSIS — J44.0 CHRONIC OBSTRUCTIVE PULMONARY DISEASE WITH ACUTE LOWER RESPIRATORY INFECTION: ICD-10-CM

## 2025-06-25 DIAGNOSIS — R13.12 OROPHARYNGEAL DYSPHAGIA: ICD-10-CM

## 2025-06-25 DIAGNOSIS — R13.13 PHARYNGEAL DYSPHAGIA: ICD-10-CM

## 2025-06-25 DIAGNOSIS — J96.01 ACUTE HYPOXIC RESPIRATORY FAILURE: ICD-10-CM

## 2025-06-25 PROBLEM — Z72.0 TOBACCO ABUSE: Status: ACTIVE | Noted: 2025-06-25

## 2025-06-25 PROBLEM — J40 BRONCHITIS: Status: ACTIVE | Noted: 2025-06-25

## 2025-06-25 PROBLEM — A41.9 SEPSIS: Status: ACTIVE | Noted: 2025-06-25

## 2025-06-25 PROBLEM — J44.1 COPD WITH ACUTE EXACERBATION: Status: ACTIVE | Noted: 2025-06-25

## 2025-06-25 PROBLEM — K21.9 GERD WITHOUT ESOPHAGITIS: Status: ACTIVE | Noted: 2025-06-25

## 2025-06-25 PROBLEM — J44.9 COPD (CHRONIC OBSTRUCTIVE PULMONARY DISEASE): Status: ACTIVE | Noted: 2025-06-25

## 2025-06-25 PROBLEM — R10.9 ABDOMINAL PAIN: Status: ACTIVE | Noted: 2025-06-25

## 2025-06-25 LAB
ALBUMIN SERPL-MCNC: 3.6 G/DL (ref 3.5–5.2)
ALBUMIN/GLOB SERPL: 1 G/DL
ALP SERPL-CCNC: 94 U/L (ref 39–117)
ALT SERPL W P-5'-P-CCNC: 9 U/L (ref 1–41)
ANION GAP SERPL CALCULATED.3IONS-SCNC: 14 MMOL/L (ref 5–15)
ARTERIAL PATENCY WRIST A: ABNORMAL
ARTERIAL PATENCY WRIST A: ABNORMAL
AST SERPL-CCNC: 21 U/L (ref 1–40)
ATMOSPHERIC PRESS: ABNORMAL MM[HG]
ATMOSPHERIC PRESS: ABNORMAL MM[HG]
B PARAPERT DNA SPEC QL NAA+PROBE: NOT DETECTED
B PERT DNA SPEC QL NAA+PROBE: NOT DETECTED
BASE EXCESS BLDA CALC-SCNC: -2.1 MMOL/L (ref 0–2)
BASE EXCESS BLDA CALC-SCNC: -3.4 MMOL/L (ref 0–2)
BASOPHILS # BLD AUTO: 0.02 10*3/MM3 (ref 0–0.2)
BASOPHILS NFR BLD AUTO: 0.1 % (ref 0–1.5)
BDY SITE: ABNORMAL
BDY SITE: ABNORMAL
BILIRUB SERPL-MCNC: 0.4 MG/DL (ref 0–1.2)
BILIRUB UR QL STRIP: NEGATIVE
BODY TEMPERATURE: 37
BODY TEMPERATURE: 37
BUN SERPL-MCNC: 23.6 MG/DL (ref 8–23)
BUN/CREAT SERPL: 20.2 (ref 7–25)
C PNEUM DNA NPH QL NAA+NON-PROBE: NOT DETECTED
CALCIUM SPEC-SCNC: 8.6 MG/DL (ref 8.6–10.5)
CHLORIDE SERPL-SCNC: 94 MMOL/L (ref 98–107)
CLARITY UR: CLEAR
CO2 BLDA-SCNC: 20.3 MMOL/L (ref 22–33)
CO2 BLDA-SCNC: 21.3 MMOL/L (ref 22–33)
CO2 SERPL-SCNC: 20 MMOL/L (ref 22–29)
COHGB MFR BLD: 1.1 % (ref 0–2)
COHGB MFR BLD: 1.8 % (ref 0–2)
COLOR UR: ABNORMAL
CREAT SERPL-MCNC: 1.17 MG/DL (ref 0.76–1.27)
CRP SERPL-MCNC: 34.44 MG/DL (ref 0–0.5)
D-LACTATE SERPL-SCNC: 0.9 MMOL/L (ref 0.5–2)
D-LACTATE SERPL-SCNC: 2.1 MMOL/L (ref 0.5–2)
DEPRECATED RDW RBC AUTO: 44.9 FL (ref 37–54)
EGFRCR SERPLBLD CKD-EPI 2021: 69.2 ML/MIN/1.73
EOSINOPHIL # BLD AUTO: 0 10*3/MM3 (ref 0–0.4)
EOSINOPHIL NFR BLD AUTO: 0 % (ref 0.3–6.2)
EPAP: 0
EPAP: 0
ERYTHROCYTE [DISTWIDTH] IN BLOOD BY AUTOMATED COUNT: 12.4 % (ref 12.3–15.4)
ETHANOL BLD-MCNC: <10 MG/DL (ref 0–10)
FLUAV SUBTYP SPEC NAA+PROBE: NOT DETECTED
FLUBV RNA ISLT QL NAA+PROBE: NOT DETECTED
GEN 5 1HR TROPONIN T REFLEX: 15 NG/L
GLOBULIN UR ELPH-MCNC: 3.5 GM/DL
GLUCOSE SERPL-MCNC: 97 MG/DL (ref 65–99)
GLUCOSE UR STRIP-MCNC: NEGATIVE MG/DL
HADV DNA SPEC NAA+PROBE: NOT DETECTED
HCO3 BLDA-SCNC: 19.5 MMOL/L (ref 20–26)
HCO3 BLDA-SCNC: 20.4 MMOL/L (ref 20–26)
HCOV 229E RNA SPEC QL NAA+PROBE: NOT DETECTED
HCOV HKU1 RNA SPEC QL NAA+PROBE: NOT DETECTED
HCOV NL63 RNA SPEC QL NAA+PROBE: NOT DETECTED
HCOV OC43 RNA SPEC QL NAA+PROBE: NOT DETECTED
HCT VFR BLD AUTO: 39.4 % (ref 37.5–51)
HCT VFR BLD CALC: 36.1 % (ref 38–51)
HCT VFR BLD CALC: 39.4 % (ref 38–51)
HGB BLD-MCNC: 13.2 G/DL (ref 13–17.7)
HGB BLDA-MCNC: 11.8 G/DL (ref 13.5–17.5)
HGB BLDA-MCNC: 12.9 G/DL (ref 13.5–17.5)
HGB UR QL STRIP.AUTO: NEGATIVE
HMPV RNA NPH QL NAA+NON-PROBE: NOT DETECTED
HOLD SPECIMEN: NORMAL
HPIV1 RNA ISLT QL NAA+PROBE: NOT DETECTED
HPIV2 RNA SPEC QL NAA+PROBE: NOT DETECTED
HPIV3 RNA NPH QL NAA+PROBE: NOT DETECTED
HPIV4 P GENE NPH QL NAA+PROBE: NOT DETECTED
IMM GRANULOCYTES # BLD AUTO: 0.09 10*3/MM3 (ref 0–0.05)
IMM GRANULOCYTES NFR BLD AUTO: 0.5 % (ref 0–0.5)
INHALED O2 CONCENTRATION: 28 %
INHALED O2 CONCENTRATION: 32 %
IPAP: 0
IPAP: 0
KETONES UR QL STRIP: ABNORMAL
LEUKOCYTE ESTERASE UR QL STRIP.AUTO: ABNORMAL
LIPASE SERPL-CCNC: 22 U/L (ref 13–60)
LYMPHOCYTES # BLD AUTO: 0.5 10*3/MM3 (ref 0.7–3.1)
LYMPHOCYTES NFR BLD AUTO: 2.9 % (ref 19.6–45.3)
M PNEUMO IGG SER IA-ACNC: NOT DETECTED
MCH RBC QN AUTO: 32.8 PG (ref 26.6–33)
MCHC RBC AUTO-ENTMCNC: 33.5 G/DL (ref 31.5–35.7)
MCV RBC AUTO: 98 FL (ref 79–97)
METHGB BLD QL: 0.1 % (ref 0–1.5)
METHGB BLD QL: 0.2 % (ref 0–1.5)
MODALITY: ABNORMAL
MODALITY: ABNORMAL
MONOCYTES # BLD AUTO: 1.43 10*3/MM3 (ref 0.1–0.9)
MONOCYTES NFR BLD AUTO: 8.2 % (ref 5–12)
NEUTROPHILS NFR BLD AUTO: 15.49 10*3/MM3 (ref 1.7–7)
NEUTROPHILS NFR BLD AUTO: 88.3 % (ref 42.7–76)
NITRITE UR QL STRIP: NEGATIVE
NRBC BLD AUTO-RTO: 0 /100 WBC (ref 0–0.2)
NT-PROBNP SERPL-MCNC: 483.8 PG/ML (ref 0–900)
OXYHGB MFR BLDV: 88.9 % (ref 94–99)
OXYHGB MFR BLDV: 93.2 % (ref 94–99)
PAW @ PEAK INSP FLOW SETTING VENT: 0 CMH2O
PAW @ PEAK INSP FLOW SETTING VENT: 0 CMH2O
PCO2 BLDA: 24.6 MM HG (ref 35–45)
PCO2 BLDA: 31.7 MM HG (ref 35–45)
PCO2 TEMP ADJ BLD: 24.6 MM HG (ref 35–48)
PCO2 TEMP ADJ BLD: 31.7 MM HG (ref 35–48)
PH BLDA: 7.42 PH UNITS (ref 7.35–7.45)
PH BLDA: 7.51 PH UNITS (ref 7.35–7.45)
PH UR STRIP.AUTO: 5.5 [PH] (ref 5–8)
PH, TEMP CORRECTED: 7.42 PH UNITS
PH, TEMP CORRECTED: 7.51 PH UNITS
PLATELET # BLD AUTO: 280 10*3/MM3 (ref 140–450)
PMV BLD AUTO: 9.3 FL (ref 6–12)
PO2 BLDA: 58.3 MM HG (ref 83–108)
PO2 BLDA: 66.4 MM HG (ref 83–108)
PO2 TEMP ADJ BLD: 58.3 MM HG (ref 83–108)
PO2 TEMP ADJ BLD: 66.4 MM HG (ref 83–108)
POTASSIUM SERPL-SCNC: 5 MMOL/L (ref 3.5–5.2)
PROCALCITONIN SERPL-MCNC: 0.43 NG/ML (ref 0–0.25)
PROT SERPL-MCNC: 7.1 G/DL (ref 6–8.5)
PROT UR QL STRIP: ABNORMAL
RBC # BLD AUTO: 4.02 10*6/MM3 (ref 4.14–5.8)
RHINOVIRUS RNA SPEC NAA+PROBE: NOT DETECTED
RSV RNA NPH QL NAA+NON-PROBE: NOT DETECTED
SARS-COV-2 RNA RESP QL NAA+PROBE: NOT DETECTED
SODIUM SERPL-SCNC: 128 MMOL/L (ref 136–145)
SODIUM UR-SCNC: 24 MMOL/L
SP GR UR STRIP: 1.05 (ref 1–1.03)
TOTAL RATE: 0 BREATHS/MINUTE
TOTAL RATE: 0 BREATHS/MINUTE
TROPONIN T NUMERIC DELTA: 1 NG/L
TROPONIN T SERPL HS-MCNC: 14 NG/L
UROBILINOGEN UR QL STRIP: ABNORMAL
WBC NRBC COR # BLD AUTO: 17.53 10*3/MM3 (ref 3.4–10.8)
WHOLE BLOOD HOLD COAG: NORMAL
WHOLE BLOOD HOLD SPECIMEN: NORMAL

## 2025-06-25 PROCEDURE — 36600 WITHDRAWAL OF ARTERIAL BLOOD: CPT

## 2025-06-25 PROCEDURE — 99222 1ST HOSP IP/OBS MODERATE 55: CPT | Performed by: NURSE PRACTITIONER

## 2025-06-25 PROCEDURE — 94799 UNLISTED PULMONARY SVC/PX: CPT

## 2025-06-25 PROCEDURE — 82375 ASSAY CARBOXYHB QUANT: CPT

## 2025-06-25 PROCEDURE — 94640 AIRWAY INHALATION TREATMENT: CPT

## 2025-06-25 PROCEDURE — 25010000002 MORPHINE PER 10 MG: Performed by: EMERGENCY MEDICINE

## 2025-06-25 PROCEDURE — 25010000002 CEFTRIAXONE PER 250 MG: Performed by: NURSE PRACTITIONER

## 2025-06-25 PROCEDURE — 85025 COMPLETE CBC W/AUTO DIFF WBC: CPT | Performed by: EMERGENCY MEDICINE

## 2025-06-25 PROCEDURE — 82805 BLOOD GASES W/O2 SATURATION: CPT

## 2025-06-25 PROCEDURE — 84145 PROCALCITONIN (PCT): CPT | Performed by: NURSE PRACTITIONER

## 2025-06-25 PROCEDURE — 83690 ASSAY OF LIPASE: CPT | Performed by: NURSE PRACTITIONER

## 2025-06-25 PROCEDURE — 0202U NFCT DS 22 TRGT SARS-COV-2: CPT | Performed by: NURSE PRACTITIONER

## 2025-06-25 PROCEDURE — 83880 ASSAY OF NATRIURETIC PEPTIDE: CPT | Performed by: EMERGENCY MEDICINE

## 2025-06-25 PROCEDURE — 74176 CT ABD & PELVIS W/O CONTRAST: CPT

## 2025-06-25 PROCEDURE — 84484 ASSAY OF TROPONIN QUANT: CPT | Performed by: EMERGENCY MEDICINE

## 2025-06-25 PROCEDURE — 83050 HGB METHEMOGLOBIN QUAN: CPT

## 2025-06-25 PROCEDURE — 71275 CT ANGIOGRAPHY CHEST: CPT

## 2025-06-25 PROCEDURE — 99285 EMERGENCY DEPT VISIT HI MDM: CPT

## 2025-06-25 PROCEDURE — 83935 ASSAY OF URINE OSMOLALITY: CPT | Performed by: NURSE PRACTITIONER

## 2025-06-25 PROCEDURE — 93005 ELECTROCARDIOGRAM TRACING: CPT

## 2025-06-25 PROCEDURE — 93005 ELECTROCARDIOGRAM TRACING: CPT | Performed by: EMERGENCY MEDICINE

## 2025-06-25 PROCEDURE — 25010000002 DOXYCYCLINE 100 MG RECONSTITUTED SOLUTION 1 EACH VIAL: Performed by: NURSE PRACTITIONER

## 2025-06-25 PROCEDURE — 25510000001 IOPAMIDOL PER 1 ML: Performed by: FAMILY MEDICINE

## 2025-06-25 PROCEDURE — 83605 ASSAY OF LACTIC ACID: CPT | Performed by: NURSE PRACTITIONER

## 2025-06-25 PROCEDURE — 82077 ASSAY SPEC XCP UR&BREATH IA: CPT | Performed by: NURSE PRACTITIONER

## 2025-06-25 PROCEDURE — 84300 ASSAY OF URINE SODIUM: CPT | Performed by: NURSE PRACTITIONER

## 2025-06-25 PROCEDURE — 71045 X-RAY EXAM CHEST 1 VIEW: CPT

## 2025-06-25 PROCEDURE — 81001 URINALYSIS AUTO W/SCOPE: CPT | Performed by: NURSE PRACTITIONER

## 2025-06-25 PROCEDURE — 87040 BLOOD CULTURE FOR BACTERIA: CPT | Performed by: NURSE PRACTITIONER

## 2025-06-25 PROCEDURE — 25010000002 HYDROMORPHONE PER 4 MG: Performed by: FAMILY MEDICINE

## 2025-06-25 PROCEDURE — 86140 C-REACTIVE PROTEIN: CPT | Performed by: NURSE PRACTITIONER

## 2025-06-25 PROCEDURE — 36415 COLL VENOUS BLD VENIPUNCTURE: CPT

## 2025-06-25 PROCEDURE — 25810000003 SEPSIS FLUID NS 0.9 % SOLUTION: Performed by: NURSE PRACTITIONER

## 2025-06-25 PROCEDURE — 87086 URINE CULTURE/COLONY COUNT: CPT | Performed by: NURSE PRACTITIONER

## 2025-06-25 PROCEDURE — 80053 COMPREHEN METABOLIC PANEL: CPT | Performed by: EMERGENCY MEDICINE

## 2025-06-25 RX ORDER — SODIUM CHLORIDE 0.9 % (FLUSH) 0.9 %
10 SYRINGE (ML) INJECTION AS NEEDED
Status: DISCONTINUED | OUTPATIENT
Start: 2025-06-25 | End: 2025-07-03 | Stop reason: HOSPADM

## 2025-06-25 RX ORDER — IPRATROPIUM BROMIDE AND ALBUTEROL SULFATE 2.5; .5 MG/3ML; MG/3ML
3 SOLUTION RESPIRATORY (INHALATION)
Status: DISCONTINUED | OUTPATIENT
Start: 2025-06-25 | End: 2025-06-30

## 2025-06-25 RX ORDER — IOPAMIDOL 755 MG/ML
100 INJECTION, SOLUTION INTRAVASCULAR
Status: COMPLETED | OUTPATIENT
Start: 2025-06-25 | End: 2025-06-25

## 2025-06-25 RX ORDER — HYDROMORPHONE HYDROCHLORIDE 1 MG/ML
0.5 INJECTION, SOLUTION INTRAMUSCULAR; INTRAVENOUS; SUBCUTANEOUS
Status: DISPENSED | OUTPATIENT
Start: 2025-06-25 | End: 2025-06-26

## 2025-06-25 RX ORDER — DULOXETIN HYDROCHLORIDE 60 MG/1
60 CAPSULE, DELAYED RELEASE ORAL DAILY
Status: DISCONTINUED | OUTPATIENT
Start: 2025-06-26 | End: 2025-07-03 | Stop reason: HOSPADM

## 2025-06-25 RX ORDER — ACETAMINOPHEN 325 MG/1
650 TABLET ORAL EVERY 4 HOURS PRN
Status: DISCONTINUED | OUTPATIENT
Start: 2025-06-25 | End: 2025-07-03 | Stop reason: HOSPADM

## 2025-06-25 RX ORDER — PRAZOSIN HYDROCHLORIDE 1 MG/1
2 CAPSULE ORAL NIGHTLY
Status: DISCONTINUED | OUTPATIENT
Start: 2025-06-26 | End: 2025-07-03 | Stop reason: HOSPADM

## 2025-06-25 RX ORDER — METOPROLOL SUCCINATE 25 MG/1
25 TABLET, EXTENDED RELEASE ORAL DAILY
Status: DISCONTINUED | OUTPATIENT
Start: 2025-06-26 | End: 2025-07-03 | Stop reason: HOSPADM

## 2025-06-25 RX ORDER — ACETAMINOPHEN 500 MG
1000 TABLET ORAL ONCE
Status: COMPLETED | OUTPATIENT
Start: 2025-06-25 | End: 2025-06-25

## 2025-06-25 RX ORDER — BISACODYL 10 MG
10 SUPPOSITORY, RECTAL RECTAL DAILY PRN
Status: DISCONTINUED | OUTPATIENT
Start: 2025-06-25 | End: 2025-06-26

## 2025-06-25 RX ORDER — ATORVASTATIN CALCIUM 40 MG/1
40 TABLET, FILM COATED ORAL NIGHTLY
Status: DISCONTINUED | OUTPATIENT
Start: 2025-06-25 | End: 2025-07-03 | Stop reason: HOSPADM

## 2025-06-25 RX ORDER — NICOTINE 21 MG/24HR
1 PATCH, TRANSDERMAL 24 HOURS TRANSDERMAL
Status: DISCONTINUED | OUTPATIENT
Start: 2025-06-26 | End: 2025-07-03 | Stop reason: HOSPADM

## 2025-06-25 RX ORDER — MORPHINE SULFATE 4 MG/ML
4 INJECTION, SOLUTION INTRAMUSCULAR; INTRAVENOUS ONCE
Status: COMPLETED | OUTPATIENT
Start: 2025-06-25 | End: 2025-06-25

## 2025-06-25 RX ORDER — IPRATROPIUM BROMIDE AND ALBUTEROL SULFATE 2.5; .5 MG/3ML; MG/3ML
3 SOLUTION RESPIRATORY (INHALATION) EVERY 4 HOURS PRN
Status: DISCONTINUED | OUTPATIENT
Start: 2025-06-25 | End: 2025-07-03 | Stop reason: HOSPADM

## 2025-06-25 RX ORDER — AMOXICILLIN 250 MG
2 CAPSULE ORAL 2 TIMES DAILY PRN
Status: DISCONTINUED | OUTPATIENT
Start: 2025-06-25 | End: 2025-06-26

## 2025-06-25 RX ORDER — POLYETHYLENE GLYCOL 3350 17 G/17G
17 POWDER, FOR SOLUTION ORAL DAILY PRN
Status: DISCONTINUED | OUTPATIENT
Start: 2025-06-25 | End: 2025-06-26

## 2025-06-25 RX ORDER — BISACODYL 5 MG/1
5 TABLET, DELAYED RELEASE ORAL DAILY PRN
Status: DISCONTINUED | OUTPATIENT
Start: 2025-06-25 | End: 2025-06-26

## 2025-06-25 RX ORDER — FLUTICASONE PROPIONATE 50 MCG
2 SPRAY, SUSPENSION (ML) NASAL DAILY
Status: DISCONTINUED | OUTPATIENT
Start: 2025-06-26 | End: 2025-07-03 | Stop reason: HOSPADM

## 2025-06-25 RX ORDER — BUDESONIDE AND FORMOTEROL FUMARATE DIHYDRATE 160; 4.5 UG/1; UG/1
2 AEROSOL RESPIRATORY (INHALATION)
Status: DISCONTINUED | OUTPATIENT
Start: 2025-06-25 | End: 2025-07-03 | Stop reason: HOSPADM

## 2025-06-25 RX ORDER — SODIUM CHLORIDE 9 MG/ML
40 INJECTION, SOLUTION INTRAVENOUS AS NEEDED
Status: DISCONTINUED | OUTPATIENT
Start: 2025-06-25 | End: 2025-07-03 | Stop reason: HOSPADM

## 2025-06-25 RX ORDER — KETOROLAC TROMETHAMINE 30 MG/ML
15 INJECTION, SOLUTION INTRAMUSCULAR; INTRAVENOUS EVERY 6 HOURS PRN
Status: ACTIVE | OUTPATIENT
Start: 2025-06-25 | End: 2025-06-30

## 2025-06-25 RX ORDER — SODIUM CHLORIDE 0.9 % (FLUSH) 0.9 %
10 SYRINGE (ML) INJECTION EVERY 12 HOURS SCHEDULED
Status: DISCONTINUED | OUTPATIENT
Start: 2025-06-25 | End: 2025-07-03 | Stop reason: HOSPADM

## 2025-06-25 RX ORDER — SODIUM CHLORIDE 9 MG/ML
75 INJECTION, SOLUTION INTRAVENOUS CONTINUOUS
Status: ACTIVE | OUTPATIENT
Start: 2025-06-25 | End: 2025-06-26

## 2025-06-25 RX ORDER — PANTOPRAZOLE SODIUM 40 MG/1
40 TABLET, DELAYED RELEASE ORAL
Status: DISCONTINUED | OUTPATIENT
Start: 2025-06-26 | End: 2025-07-03 | Stop reason: HOSPADM

## 2025-06-25 RX ORDER — LISINOPRIL 10 MG/1
10 TABLET ORAL DAILY
Status: DISCONTINUED | OUTPATIENT
Start: 2025-06-26 | End: 2025-07-03

## 2025-06-25 RX ADMIN — IPRATROPIUM BROMIDE AND ALBUTEROL SULFATE 3 ML: 2.5; .5 SOLUTION RESPIRATORY (INHALATION) at 22:52

## 2025-06-25 RX ADMIN — ACETAMINOPHEN 1000 MG: 500 TABLET ORAL at 16:46

## 2025-06-25 RX ADMIN — SODIUM CHLORIDE 1 G: 900 INJECTION INTRAVENOUS at 16:53

## 2025-06-25 RX ADMIN — HYDROMORPHONE HYDROCHLORIDE 0.5 MG: 1 INJECTION, SOLUTION INTRAMUSCULAR; INTRAVENOUS; SUBCUTANEOUS at 22:14

## 2025-06-25 RX ADMIN — MORPHINE SULFATE 4 MG: 4 INJECTION, SOLUTION INTRAMUSCULAR; INTRAVENOUS at 17:28

## 2025-06-25 RX ADMIN — IOPAMIDOL 85 ML: 755 INJECTION, SOLUTION INTRAVENOUS at 18:35

## 2025-06-25 RX ADMIN — SODIUM CHLORIDE 2430 ML: 9 INJECTION, SOLUTION INTRAVENOUS at 16:47

## 2025-06-25 RX ADMIN — DOXYCYCLINE 100 MG: 100 INJECTION, POWDER, LYOPHILIZED, FOR SOLUTION INTRAVENOUS at 17:28

## 2025-06-25 NOTE — ED PROVIDER NOTES
EMERGENCY DEPARTMENT ENCOUNTER    Pt Name: Oscar Avila  MRN: [id number]  Pt :   1960  Room Number:    Date of encounter:  2025  PCP: Concepción Qugiley DO  ED Provider: DEONDRE Arnold    Historian: Patient, spouse      HPI:  Chief Complaint: Shortness of breath, cough        Context: Oscar Avila is a 64 y.o. male who presents to the ED c/o shortness of breath, cough.  Patient was seen in pit initially.  Spouse provided significant portion of the history.  She told me that patient is a current smoker with history of COPD.  For the past week he had deeper cough. He sustained 2 recent falls and was evaluated in the ER with imaging.  He also reports intermittent hiccups and requesting abdominal discomfort.  No home oxygen requirement.  Current smoker.  Denies recent alcohol use.      PAST MEDICAL HISTORY  Past Medical History:   Diagnosis Date    Alcohol use     Hyperlipidemia     Hypertension     Neuromuscular disorder          PAST SURGICAL HISTORY  Past Surgical History:   Procedure Laterality Date    CARPAL TUNNEL RELEASE Left     CARPAL TUNNEL RELEASE Right 2023    Dr. Esa Acosta    FOOT FRACTURE SURGERY Right 2018    fracture to right foot - has a metal pin on great toe    WISDOM TOOTH EXTRACTION           FAMILY HISTORY  Family History   Problem Relation Age of Onset    Cancer Mother         small cell cancer    Cancer Father         colon cancer         SOCIAL HISTORY  Social History     Socioeconomic History    Marital status:    Tobacco Use    Smoking status: Every Day     Current packs/day: 1.00     Average packs/day: 1 pack/day for 49.5 years (49.5 ttl pk-yrs)     Types: Cigarettes     Start date:      Passive exposure: Past    Smokeless tobacco: Never    Tobacco comments:     Trying to quit smoking. Would like to go back on the nicotine patch from PCP   Vaping Use    Vaping status: Former    Substances: Nicotine    Devices: Disposable,  Pre-filled or refillable cartridge   Substance and Sexual Activity    Alcohol use: Not Currently     Comment: Quit drinking. Started Revive Program    Drug use: Not Currently     Comment: alcohol    Sexual activity: Yes     Partners: Female         ALLERGIES  Penicillins        REVIEW OF SYSTEMS  Review of Systems       All systems reviewed and negative except for those discussed in HPI.       PHYSICAL EXAM    I have reviewed the triage vital signs and nursing notes.    ED Triage Vitals   Temp Heart Rate Resp BP SpO2   06/25/25 1528 06/25/25 1529 06/25/25 1529 06/25/25 1529 06/25/25 1528   100.5 °F (38.1 °C) 112 28 151/86 (!) 87 %      Temp src Heart Rate Source Patient Position BP Location FiO2 (%)   06/25/25 1528 06/25/25 1528 06/25/25 1528 06/25/25 1528 --   Oral Monitor Sitting Left arm        Physical Exam  GENERAL:   Appears in no acute distress.  Chronically ill-appearing  HENT: Nares patent.  EYES: No scleral icterus.  CV: Regular rhythm, regular rate.  RESPIRATORY: Tachypnea, moist cough noted no audible wheezes, rales or rhonchi.  ABDOMEN: Soft, protuberant, tender to palpation right mid upper  MUSCULOSKELETAL: No deformities.   NEURO: Alert, moves all extremities, follows commands.  SKIN: Warm, dry, no rash visualized.      LAB RESULTS  Recent Results (from the past 24 hours)   ECG 12 Lead Dyspnea    Collection Time: 06/25/25  3:34 PM   Result Value Ref Range    QT Interval 286 ms    QTC Interval 398 ms   Comprehensive Metabolic Panel    Collection Time: 06/25/25  3:39 PM    Specimen: Blood   Result Value Ref Range    Glucose 97 65 - 99 mg/dL    BUN 23.6 (H) 8.0 - 23.0 mg/dL    Creatinine 1.17 0.76 - 1.27 mg/dL    Sodium 128 (L) 136 - 145 mmol/L    Potassium 5.0 3.5 - 5.2 mmol/L    Chloride 94 (L) 98 - 107 mmol/L    CO2 20.0 (L) 22.0 - 29.0 mmol/L    Calcium 8.6 8.6 - 10.5 mg/dL    Total Protein 7.1 6.0 - 8.5 g/dL    Albumin 3.6 3.5 - 5.2 g/dL    ALT (SGPT) 9 1 - 41 U/L    AST (SGOT) 21 1 - 40 U/L     Alkaline Phosphatase 94 39 - 117 U/L    Total Bilirubin 0.4 0.0 - 1.2 mg/dL    Globulin 3.5 gm/dL    A/G Ratio 1.0 g/dL    BUN/Creatinine Ratio 20.2 7.0 - 25.0    Anion Gap 14.0 5.0 - 15.0 mmol/L    eGFR 69.2 >60.0 mL/min/1.73   BNP    Collection Time: 06/25/25  3:39 PM    Specimen: Blood   Result Value Ref Range    proBNP 483.8 0.0 - 900.0 pg/mL   High Sensitivity Troponin T    Collection Time: 06/25/25  3:39 PM    Specimen: Blood   Result Value Ref Range    HS Troponin T 14 <22 ng/L   Green Top (Gel)    Collection Time: 06/25/25  3:39 PM   Result Value Ref Range    Extra Tube Hold for add-ons.    Lavender Top    Collection Time: 06/25/25  3:39 PM   Result Value Ref Range    Extra Tube hold for add-on    Gold Top - SST    Collection Time: 06/25/25  3:39 PM   Result Value Ref Range    Extra Tube Hold for add-ons.    Gray Top    Collection Time: 06/25/25  3:39 PM   Result Value Ref Range    Extra Tube Hold for add-ons.    Light Blue Top    Collection Time: 06/25/25  3:39 PM   Result Value Ref Range    Extra Tube Hold for add-ons.    CBC Auto Differential    Collection Time: 06/25/25  3:39 PM    Specimen: Blood   Result Value Ref Range    WBC 17.53 (H) 3.40 - 10.80 10*3/mm3    RBC 4.02 (L) 4.14 - 5.80 10*6/mm3    Hemoglobin 13.2 13.0 - 17.7 g/dL    Hematocrit 39.4 37.5 - 51.0 %    MCV 98.0 (H) 79.0 - 97.0 fL    MCH 32.8 26.6 - 33.0 pg    MCHC 33.5 31.5 - 35.7 g/dL    RDW 12.4 12.3 - 15.4 %    RDW-SD 44.9 37.0 - 54.0 fl    MPV 9.3 6.0 - 12.0 fL    Platelets 280 140 - 450 10*3/mm3    Neutrophil % 88.3 (H) 42.7 - 76.0 %    Lymphocyte % 2.9 (L) 19.6 - 45.3 %    Monocyte % 8.2 5.0 - 12.0 %    Eosinophil % 0.0 (L) 0.3 - 6.2 %    Basophil % 0.1 0.0 - 1.5 %    Immature Grans % 0.5 0.0 - 0.5 %    Neutrophils, Absolute 15.49 (H) 1.70 - 7.00 10*3/mm3    Lymphocytes, Absolute 0.50 (L) 0.70 - 3.10 10*3/mm3    Monocytes, Absolute 1.43 (H) 0.10 - 0.90 10*3/mm3    Eosinophils, Absolute 0.00 0.00 - 0.40 10*3/mm3    Basophils, Absolute  0.02 0.00 - 0.20 10*3/mm3    Immature Grans, Absolute 0.09 (H) 0.00 - 0.05 10*3/mm3    nRBC 0.0 0.0 - 0.2 /100 WBC   Lactic Acid, Plasma    Collection Time: 06/25/25  3:39 PM    Specimen: Blood   Result Value Ref Range    Lactate 2.1 (C) 0.5 - 2.0 mmol/L   Procalcitonin    Collection Time: 06/25/25  3:39 PM    Specimen: Blood   Result Value Ref Range    Procalcitonin 0.43 (H) 0.00 - 0.25 ng/mL   C-reactive Protein    Collection Time: 06/25/25  3:39 PM    Specimen: Blood   Result Value Ref Range    C-Reactive Protein 34.44 (H) 0.00 - 0.50 mg/dL   Lipase    Collection Time: 06/25/25  3:39 PM    Specimen: Blood   Result Value Ref Range    Lipase 22 13 - 60 U/L   Ethanol    Collection Time: 06/25/25  3:39 PM    Specimen: Blood   Result Value Ref Range    Ethanol <10 0 - 10 mg/dL   Blood Gas, Arterial With Co-Ox    Collection Time: 06/25/25  4:36 PM    Specimen: Arterial Blood   Result Value Ref Range    Site Right Radial     Zeyad's Test N/A     pH, Arterial 7.507 (H) 7.350 - 7.450 pH units    pCO2, Arterial 24.6 (L) 35.0 - 45.0 mm Hg    pO2, Arterial 66.4 (L) 83.0 - 108.0 mm Hg    HCO3, Arterial 19.5 (L) 20.0 - 26.0 mmol/L    Base Excess, Arterial -2.1 (L) 0.0 - 2.0 mmol/L    Hemoglobin, Blood Gas 12.9 (L) 13.5 - 17.5 g/dL    Hematocrit, Blood Gas 39.4 38.0 - 51.0 %    Oxyhemoglobin 93.2 (L) 94 - 99 %    Methemoglobin 0.10 0.00 - 1.50 %    Carboxyhemoglobin 1.8 0 - 2 %    CO2 Content 20.3 (L) 22 - 33 mmol/L    Temperature 37.0     Barometric Pressure for Blood Gas      Modality Nasal Cannula     FIO2 28 %    Rate 0 Breaths/minute    PIP 0 cmH2O    IPAP 0     EPAP 0     pH, Temp Corrected 7.507 pH Units    pCO2, Temperature Corrected 24.6 (L) 35 - 48 mm Hg    pO2, Temperature Corrected 66.4 (L) 83 - 108 mm Hg   ECG 12 Lead Dyspnea    Collection Time: 06/25/25  4:41 PM   Result Value Ref Range    QT Interval 300 ms    QTC Interval 394 ms   Respiratory Panel PCR w/COVID-19(SARS-CoV-2) PATY/JACKIE/HALLIE/PAD/COR/FLORIAN In-House, NP  Swab in UTM/VTM, 2 HR TAT - Swab, Nasopharynx    Collection Time: 06/25/25  4:47 PM    Specimen: Nasopharynx; Swab   Result Value Ref Range    ADENOVIRUS, PCR Not Detected Not Detected    Coronavirus 229E Not Detected Not Detected    Coronavirus HKU1 Not Detected Not Detected    Coronavirus NL63 Not Detected Not Detected    Coronavirus OC43 Not Detected Not Detected    COVID19 Not Detected Not Detected - Ref. Range    Human Metapneumovirus Not Detected Not Detected    Human Rhinovirus/Enterovirus Not Detected Not Detected    Influenza A PCR Not Detected Not Detected    Influenza B PCR Not Detected Not Detected    Parainfluenza Virus 1 Not Detected Not Detected    Parainfluenza Virus 2 Not Detected Not Detected    Parainfluenza Virus 3 Not Detected Not Detected    Parainfluenza Virus 4 Not Detected Not Detected    RSV, PCR Not Detected Not Detected    Bordetella pertussis pcr Not Detected Not Detected    Bordetella parapertussis PCR Not Detected Not Detected    Chlamydophila pneumoniae PCR Not Detected Not Detected    Mycoplasma pneumo by PCR Not Detected Not Detected   High Sensitivity Troponin T 1Hr    Collection Time: 06/25/25  4:49 PM    Specimen: Arm, Left; Blood   Result Value Ref Range    HS Troponin T 15 <22 ng/L    Troponin T Numeric Delta 1 Abnormal if >/=3 ng/L   STAT Lactic Acid, Reflex    Collection Time: 06/25/25  7:24 PM    Specimen: Blood   Result Value Ref Range    Lactate 0.9 0.5 - 2.0 mmol/L       If labs were ordered, I independently reviewed the results and considered them in treating the patient.        RADIOLOGY  CT Angiogram Chest Pulmonary Embolism  Result Date: 6/25/2025  CT ANGIOGRAM CHEST PULMONARY EMBOLISM Date of Exam: 6/25/2025 6:29 PM EDT Indication: Pulmonary Embolism. Comparison: 5/23/2024 Technique: Axial CT images were obtained of the chest after the uneventful intravenous administration of 80 cc Isovue-370 IV contrast utilizing pulmonary embolism protocol.  In addition, a 3-D  volume rendered image was created for interpretation.  Reconstructed coronal and sagittal images were also obtained. Automated exposure control and iterative construction methods were used. Findings: The thyroid gland is normal. Emphysema. The airway is clear. Atheromatous disease of the coronary vessels. No pulmonary embolus. Right lower lobe posterior consolidation with bronchial wall thickening concerning for bronchitis. Dependent atelectatic changes noted on the left. Mild chronic anterior wedging of L1. Mild chronic anterior wedging of T7-T9. Exaggerated kyphosis of the thoracic spine. The sternum is intact. No acute rib fractures.     No pulmonary embolus. Right lower lobe consolidation with bronchial wall thickening concerning for bronchitis. Electronically Signed: Javier Mohan MD  6/25/2025 7:13 PM EDT  Workstation ID: [id number]    XR Chest 1 View  Result Date: 6/25/2025  XR CHEST 1 VW Date of Exam: 6/25/2025 3:44 PM EDT Indication: SOA triage protocol Comparison: Chest CT 5/23/2024, chest radiograph 11/23/2020. Findings: Cardiomediastinal silhouette is unchanged. Emphysematous changes of the lungs. Mild interstitial changes in the right and left lung bases. No pleural effusion or pneumothorax. Osseous structures are unchanged.     Impression: Mild interstitial changes in the right greater than left lung bases, which may reflect an infectious/inflammatory process. Background of emphysema. Electronically Signed: Iron Calderón MD  6/25/2025 4:07 PM EDT  Workstation ID: [id number]    CT Head Without Contrast  Result Date: 6/24/2025  CT HEAD WO CONTRAST, CT CERVICAL SPINE WO CONTRAST, CT THORACIC SPINE WO CONTRAST Date of Exam: 6/24/2025 10:06 PM EDT Indication: fall, trauma. Comparison: Head CT 11/23/2020. Chest CT 5/23/2024. Technique: Axial CT images were obtained of the head, cervical spine, and thoracic spine without contrast administration.  Automated exposure control and iterative construction methods were  used. Findings: Head CT: No acute intracranial hemorrhage.Intact appearing gray-white differentiation.No extra-axial fluid collection.No significant mass effect. No hydrocephalus. Mild generalized parenchymal volume loss. Scattered areas of periventricular and subcortical white matter hypoattenuation, nonspecific, perhaps from small vessel ischemic/hypertensive changes in a patient of this age.There are intracranial atherosclerotic calcifications. Mild scattered mucosal thickening in the paranasal sinuses.Mastoid air cells are essentially clear.Included globes and orbits appear unremarkable by CT. Left frontal scalp edema/contusion. No acute calvarial fracture seen. Cervical spine CT: There are 7 cervical type vertebral bodies. No acute fracture or traumatic malalignment. Straightening of the normal cervical lordosis.. Grade 1 anterolisthesis of C5 on C6. Multilevel degenerative endplate changes. Vertebral body heights otherwise appear maintained. There are multilevel degenerative changes of the cervical spine including disc osteophytes, uncovertebral hypertrophy, and facet arthropathy. Mild multifocal spinal canal stenosis. Multilevel moderate to severe neural foraminal narrowing. There is no significant prevertebral soft tissue edema.. Vascular calcifications. Scattered atelectasis and/or scarring in the imaged lungs. Thoracic spine CT: There are 12 rib-bearing vertebral bodies. No acute fracture or traumatic malalignment.. There is a chronic appearing compression fracture at L1. There is also multilevel chronic appearing anterior wedging in the mid thoracic vertebrae. There is exaggerated thoracic kyphosis. Multilevel degenerative endplate changes. Vertebral body heights are otherwise maintained. Multilevel degenerative changes including disc height loss, osteophytes, and facet arthropathy. Mild multilevel spinal canal stenosis. Mild to moderate multilevel foraminal narrowing. Consolidative opacities in the right  lower lobe. Vascular calcifications.     Impression: HEAD CT: No acute intracranial findings. CERVICAL SPINE CT: No acute fracture or traumatic malalignment. THORACIC SPINE CT: No acute fracture or traumatic malalignment. Chronic appearing compression fracture at L1 and multilevel chronic anterior wedging in the midthoracic vertebrae. Consolidative opacities in the right lower lobe, suspicious for aspiration or pneumonia. Electronically Signed: Richy Monsivais MD  6/24/2025 10:38 PM EDT  Workstation ID: [id number]    CT Cervical Spine Without Contrast  Result Date: 6/24/2025  CT HEAD WO CONTRAST, CT CERVICAL SPINE WO CONTRAST, CT THORACIC SPINE WO CONTRAST Date of Exam: 6/24/2025 10:06 PM EDT Indication: fall, trauma. Comparison: Head CT 11/23/2020. Chest CT 5/23/2024. Technique: Axial CT images were obtained of the head, cervical spine, and thoracic spine without contrast administration.  Automated exposure control and iterative construction methods were used. Findings: Head CT: No acute intracranial hemorrhage.Intact appearing gray-white differentiation.No extra-axial fluid collection.No significant mass effect. No hydrocephalus. Mild generalized parenchymal volume loss. Scattered areas of periventricular and subcortical white matter hypoattenuation, nonspecific, perhaps from small vessel ischemic/hypertensive changes in a patient of this age.There are intracranial atherosclerotic calcifications. Mild scattered mucosal thickening in the paranasal sinuses.Mastoid air cells are essentially clear.Included globes and orbits appear unremarkable by CT. Left frontal scalp edema/contusion. No acute calvarial fracture seen. Cervical spine CT: There are 7 cervical type vertebral bodies. No acute fracture or traumatic malalignment. Straightening of the normal cervical lordosis.. Grade 1 anterolisthesis of C5 on C6. Multilevel degenerative endplate changes. Vertebral body heights otherwise appear maintained. There are  multilevel degenerative changes of the cervical spine including disc osteophytes, uncovertebral hypertrophy, and facet arthropathy. Mild multifocal spinal canal stenosis. Multilevel moderate to severe neural foraminal narrowing. There is no significant prevertebral soft tissue edema.. Vascular calcifications. Scattered atelectasis and/or scarring in the imaged lungs. Thoracic spine CT: There are 12 rib-bearing vertebral bodies. No acute fracture or traumatic malalignment.. There is a chronic appearing compression fracture at L1. There is also multilevel chronic appearing anterior wedging in the mid thoracic vertebrae. There is exaggerated thoracic kyphosis. Multilevel degenerative endplate changes. Vertebral body heights are otherwise maintained. Multilevel degenerative changes including disc height loss, osteophytes, and facet arthropathy. Mild multilevel spinal canal stenosis. Mild to moderate multilevel foraminal narrowing. Consolidative opacities in the right lower lobe. Vascular calcifications.     Impression: HEAD CT: No acute intracranial findings. CERVICAL SPINE CT: No acute fracture or traumatic malalignment. THORACIC SPINE CT: No acute fracture or traumatic malalignment. Chronic appearing compression fracture at L1 and multilevel chronic anterior wedging in the midthoracic vertebrae. Consolidative opacities in the right lower lobe, suspicious for aspiration or pneumonia. Electronically Signed: Richy Monsivais MD  6/24/2025 10:38 PM EDT  Workstation ID: [id number]    CT Thoracic Spine Without Contrast  Result Date: 6/24/2025  CT HEAD WO CONTRAST, CT CERVICAL SPINE WO CONTRAST, CT THORACIC SPINE WO CONTRAST Date of Exam: 6/24/2025 10:06 PM EDT Indication: fall, trauma. Comparison: Head CT 11/23/2020. Chest CT 5/23/2024. Technique: Axial CT images were obtained of the head, cervical spine, and thoracic spine without contrast administration.  Automated exposure control and iterative construction methods were  lower lobe. Vascular calcifications.     Impression: HEAD CT: No acute intracranial findings. CERVICAL SPINE CT: No acute fracture or traumatic malalignment. THORACIC SPINE CT: No acute fracture or traumatic malalignment. Chronic appearing compression fracture at L1 and multilevel chronic anterior wedging in the midthoracic vertebrae. Consolidative opacities in the right lower lobe, suspicious for aspiration or pneumonia. Electronically Signed: Richy Monsivais MD  6/24/2025 10:38 PM EDT  Workstation ID: [id number]      I ordered and independently reviewed the above noted radiographic studies.      I viewed images of chest x-ray which showed bilateral lower lobes interstitial changes per my independent interpretation.    See radiologist's dictation for official interpretation.        PROCEDURES    Procedures    ECG 12 Lead Dyspnea   Preliminary Result   Test Reason : Dyspnea   Blood Pressure :   */*   mmHG   Vent. Rate : 104 BPM     Atrial Rate : 104 BPM      P-R Int : 132 ms          QRS Dur :  72 ms       QT Int : 300 ms       P-R-T Axes :  13  45  59 degrees     QTcB Int : 394 ms      Sinus tachycardia   Otherwise normal ECG   When compared with ECG of 25-Jun-2025 15:34, (Unconfirmed)   Criteria for Septal infarct are no longer present      Referred By: ED MD           Confirmed By:       Telemetry Scan   Final Result      ECG 12 Lead Dyspnea   Preliminary Result   Test Reason : Dyspnea   Blood Pressure :   */*   mmHG   Vent. Rate : 117 BPM     Atrial Rate : 117 BPM      P-R Int : 174 ms          QRS Dur :  68 ms       QT Int : 286 ms       P-R-T Axes :  62  43  65 degrees     QTcB Int : 398 ms      Sinus tachycardia   Septal infarct (cited on or before 23-Nov-2020)   Abnormal ECG   When compared with ECG of 14-Apr-2023 09:07,   Questionable change in initial forces of Anterior leads      Referred By: ED           Confirmed By:           MEDICATIONS GIVEN IN ER    Medications   sodium chloride 0.9 % flush 10 mL  (has no administration in time range)   sodium chloride 0.9 % flush 10 mL (has no administration in time range)   atorvastatin (LIPITOR) tablet 40 mg (has no administration in time range)   DULoxetine (CYMBALTA) DR capsule 60 mg (has no administration in time range)   fluticasone (FLONASE) 50 MCG/ACT nasal spray 2 spray (has no administration in time range)   budesonide-formoterol (SYMBICORT) 160-4.5 MCG/ACT inhaler 2 puff (has no administration in time range)   lisinopril (PRINIVIL,ZESTRIL) tablet 10 mg (has no administration in time range)   metoprolol succinate XL (TOPROL-XL) 24 hr tablet 25 mg (has no administration in time range)   pantoprazole (PROTONIX) EC tablet 40 mg (has no administration in time range)   prazosin (MINIPRESS) capsule 2 mg (has no administration in time range)   sodium chloride 0.9 % flush 10 mL (has no administration in time range)   sodium chloride 0.9 % flush 10 mL (has no administration in time range)   sodium chloride 0.9 % infusion 40 mL (has no administration in time range)   ipratropium-albuterol (DUO-NEB) nebulizer solution 3 mL (has no administration in time range)   ipratropium-albuterol (DUO-NEB) nebulizer solution 3 mL (has no administration in time range)   nicotine (NICODERM CQ) 21 MG/24HR patch 1 patch (has no administration in time range)   sodium chloride 0.9 % infusion (has no administration in time range)   doxycycline (VIBRAMYCIN) 100 mg in sodium chloride 0.9 % 100 mL MBP (has no administration in time range)   cefTRIAXone (ROCEPHIN) 1,000 mg in sodium chloride 0.9 % 100 mL MBP (has no administration in time range)   acetaminophen (TYLENOL) tablet 650 mg (has no administration in time range)   sennosides-docusate (PERICOLACE) 8.6-50 MG per tablet 2 tablet (has no administration in time range)     And   polyethylene glycol (MIRALAX) packet 17 g (has no administration in time range)     And   bisacodyl (DULCOLAX) EC tablet 5 mg (has no administration in time range)      And   bisacodyl (DULCOLAX) suppository 10 mg (has no administration in time range)   ketorolac (TORADOL) injection 15 mg (has no administration in time range)   HYDROmorphone (DILAUDID) injection 0.5 mg (0.5 mg Intravenous Given 6/25/25 2214)   cefTRIAXone (ROCEPHIN) 1 g in sodium chloride 0.9 % 100 mL MBP (0 g Intravenous Stopped 6/25/25 1728)   doxycycline (VIBRAMYCIN) 100 mg in sodium chloride 0.9 % 100 mL MBP (0 mg Intravenous Stopped 6/25/25 1802)   sepsis fluid NS 0.9 % bolus 2,430 mL (2,430 mL Intravenous New Bag 6/25/25 1647)   acetaminophen (TYLENOL) tablet 1,000 mg (1,000 mg Oral Given 6/25/25 1646)   Morphine sulfate (PF) injection 4 mg (4 mg Intravenous Given 6/25/25 1728)   iopamidol (ISOVUE-370) 76 % injection 100 mL (85 mL Intravenous Given 6/25/25 1835)         MEDICAL DECISION MAKING, PROGRESS, and CONSULTS    All labs, if obtained, have been independently reviewed by me.  All radiology studies, if obtained, have been reviewed by me and the radiologist dictating the report.  All EKG's, if obtained, have been independently viewed and interpreted by me/my attending physician.      Discussion below represents my analysis of pertinent findings related to patient's condition, differential diagnosis, treatment plan and final disposition.  Patient is 64-year-old male with history of COPD, tobacco use, hypertension, dyslipidemia presents for evaluation of shortness of breath, cough and.  On physical exam he was chronically ill-appearing, febrile, tachypneic, lungs were clear to auscultation.  Lab work significant for hyponatremia sodium 128, leukocytosis 17.53, mildly elevated procalcitonin 0.43, CRP 34.44, lactate 2.1, negative respiratory panel, chest x-ray showed interstitial changes to lower lobes infectious versus inflammatory process.  Patient triggered sepsis protocol, IV fluids, antibiotics were given per protocol.  Hospital service was consulted for admission.  CT chest pending.                        Differential diagnosis:    Pneumonia, bronchitis, COPD exacerbation, sepsis, SIRS      Additional sources:    - Discussed/ obtained information from independent historians: Spouse    - External (non-ED) record review: 5/27/2025, office visit with cardiology, follow-up of on dyslipidemia, coronary artery disease  Cardiac testing:  Stress test April 2023       Patient reported GI discomfort and lightheadedness after lexiscan injeciton which resolved in recovery.    No significant ST-T changes from baseline noted.    Diaphragmatic attenuation artifact is present.    Myocardial perfusion imaging indicates a normal myocardial perfusion study with no evidence of ischemia.    Left ventricular ejection fraction is hyperdynamic (Calculated EF > 70%).    Moderate coronary artery calcification present.    Impressions are consistent with a low risk study.     Echo April 2023    Left ventricular ejection fraction appears to be 56 - 60%.    The left ventricular cavity is small in size. Left ventricular diastolic function was normal.    No significant valvular stenosis or regurgitation.        - Chronic or social conditions impacting care: Current smoker    - Shared decision making: Patient      Orders placed during this visit:  Orders Placed This Encounter   Procedures    Blood Culture - Blood,    Blood Culture - Blood,    Respiratory Panel PCR w/COVID-19(SARS-CoV-2) PATY/JACKIE/HALLIE/PAD/COR/FLORIAN In-House, NP Swab in UTM/VTM, 2 HR TAT - Swab, Nasopharynx    Respiratory Culture - Sputum, Cough    XR Chest 1 View    CT Angiogram Chest Pulmonary Embolism    CT Abdomen Pelvis Without Contrast    [place] Draw    Comprehensive Metabolic Panel    BNP    High Sensitivity Troponin T    CBC Auto Differential    Lactic Acid, Plasma    Procalcitonin    C-reactive Protein    Lipase    Blood Gas, Arterial -With Co-Ox Panel: Yes    Ethanol    Blood Gas, Arterial With Co-Ox    High Sensitivity Troponin T 1Hr    STAT Lactic Acid, Reflex     Urinalysis With Culture If Indicated - Urine, Clean Catch    Sodium    Blood Gas, Arterial -With Co-Ox Panel: Yes    Basic Metabolic Panel    Magnesium    Sodium, Urine, Random - Urine, Clean Catch    Osmolality, Serum    Osmolality, Urine - Urine, Clean Catch    CBC Auto Differential    NPO Diet NPO Type: Strict NPO    Undress & Gown    Vital Signs    Vital Signs    Weigh Patient    Nursing Dysphagia Screening (Complete Prior to Giving Anything By Mouth)    RN to Place Order SLP Consult - Eval & Treat Choosing Reason of RN Dysphagia Screen Failed    Nurse to Call MD or Nutrition Services for Diet if Patient Passes Dysphagia Screen    Tobacco Cessation Education    Place Sequential Compression Device    Maintain Sequential Compression Device    Maintain IV Access    Telemetry - Place Orders & Notify Provider of Results When Patient Experiences Acute Chest Pain, Dysrhythmia or Respiratory Distress    May Be Off Telemetry for Tests    Continuous Pulse Oximetry    Turn Patient    Bed Alarm On    Orthostatic Blood Pressure    Strict Intake & Output    Nursing Dysphagia Screening (Complete Prior to Giving Anything By Mouth)    If pt passes dysphagia screen, pt may have regular/cardiac diet. Thanks.  Nursing Communication    Code Status and Medical Interventions: CPR (Attempt to Resuscitate); Full Support    Oxygen Therapy- Nasal Cannula; Titrate 1-6 LPM Per SpO2; 90 - 95%    Incentive Spirometry    ECG 12 Lead Dyspnea    Telemetry Scan    ECG 12 Lead Tachycardia    Insert Peripheral IV    Insert Peripheral IV    Insert Peripheral IV    Inpatient Admission    Fall Precautions    CBC & Differential    Green Top (Gel)    Lavender Top    Gold Top - SST    Gray Top    Light Blue Top    CBC & Differential         Additional orders considered but not ordered:  CT abdomen    ED Course:    Consultants:      ED Course as of 06/25/25 2226 Wed Jun 25, 2025   1631 Temperature 102.3 on recheck, Tylenol ordered, sepsis protocol in  place [IR]   1753 Sodium(!): 128 [IR]   1753 WBC(!): 17.53 [IR]   1753 Procalcitonin(!): 0.43 [IR]   1753 C-Reactive Protein(!): 34.44 [IR]   1753 Lactate(!!): 2.1 [IR]   1753 Lipase: 22 [IR]      ED Course User Index  [IR] Diane Machado APRN              Shared Decision Making:  After my consideration of clinical presentation and any laboratory/radiology studies obtained, I discussed the findings with the patient/patient representative who is in agreement with the treatment plan and the final disposition.   Risks and benefits of discharge and/or observation/admission were discussed.       AS OF 22:26 EDT VITALS:    BP - 106/70  HR - 85  TEMP - (!) 102.3 °F (39.1 °C) (Oral)  O2 SATS - 97%                  DIAGNOSIS  Final diagnoses:   SOB (shortness of breath)   Hypoxia   Pneumonia of both lower lobes due to infectious organism   Leukocytosis, unspecified type   Hyponatremia         DISPOSITION  admit      Please note that portions of this document were completed with voice recognition software.     DEONDRE Arnold   06/25/25   22:26 EDT        Diane Machado APRN  06/25/25 9098

## 2025-06-25 NOTE — ED NOTES
Oscar Avila    Nursing Report ED to Floor:  Mental status: AOx4  Ambulatory status: Nonambulatory at this time r/t SOA  Oxygen Therapy:  3L NC  Cardiac Rhythm: NSR/Sinus Tach  Admitted from: Home  Safety Concerns:  Fall risk  Precautions: None  Social Issues: None  ED Room #:  06    ED Nurse Phone Extension - [phone] or may call [phone].      HPI:   Chief Complaint   Patient presents with    Shortness of Breath       Past Medical History:  Past Medical History:   Diagnosis Date    Alcohol use     Hyperlipidemia     Hypertension     Neuromuscular disorder         Past Surgical History:  Past Surgical History:   Procedure Laterality Date    CARPAL TUNNEL RELEASE Left     CARPAL TUNNEL RELEASE Right 06/12/2023    Dr. Esa Acosta    FOOT FRACTURE SURGERY Right 01/01/2018    fracture to right foot - has a metal pin on great toe    WISDOM TOOTH EXTRACTION          Admitting Doctor:   Cynthia Henley MD    Consulting Provider(s):  Consults       No orders found from 5/27/2025 to 6/26/2025.             Admitting Diagnosis:   The primary encounter diagnosis was SOB (shortness of breath). Diagnoses of Hypoxia, Pneumonia of both lower lobes due to infectious organism, Leukocytosis, unspecified type, and Hyponatremia were also pertinent to this visit.    Most Recent Vitals:   Vitals:    06/25/25 1730 06/25/25 1800 06/25/25 1830 06/25/25 1900   BP: 128/72 110/57 117/63 111/62   BP Location:       Patient Position:       Pulse: 96 96 88 88   Resp:       Temp:       TempSrc:       SpO2: 93% 92% 91% 93%   Weight:       Height:           Active LDAs/IV Access:   Lines, Drains & Airways       Active LDAs       Name Placement date Placement time Site Days    Peripheral IV 06/25/25 1645 18 G Right Antecubital 06/25/25  1645  Antecubital  less than 1                    Labs (abnormal labs have a star):   Labs Reviewed   COMPREHENSIVE METABOLIC PANEL - Abnormal; Notable for the following components:       Result Value    BUN  23.6 (*)     Sodium 128 (*)     Chloride 94 (*)     CO2 20.0 (*)     All other components within normal limits    Narrative:     GFR Categories in Chronic Kidney Disease (CKD)              GFR Category          GFR (mL/min/1.73)    Interpretation  G1                    90 or greater        Normal or high (1)  G2                    60-89                Mild decrease (1)  G3a                   45-59                Mild to moderate decrease  G3b                   30-44                Moderate to severe decrease  G4                    15-29                Severe decrease  G5                    14 or less           Kidney failure    (1)In the absence of evidence of kidney disease, neither GFR category G1 or G2 fulfill the criteria for CKD.    eGFR calculation 2021 CKD-EPI creatinine equation, which does not include race as a factor   CBC WITH AUTO DIFFERENTIAL - Abnormal; Notable for the following components:    WBC 17.53 (*)     RBC 4.02 (*)     MCV 98.0 (*)     Neutrophil % 88.3 (*)     Lymphocyte % 2.9 (*)     Eosinophil % 0.0 (*)     Neutrophils, Absolute 15.49 (*)     Lymphocytes, Absolute 0.50 (*)     Monocytes, Absolute 1.43 (*)     Immature Grans, Absolute 0.09 (*)     All other components within normal limits   LACTIC ACID, PLASMA - Abnormal; Notable for the following components:    Lactate 2.1 (*)     All other components within normal limits   PROCALCITONIN - Abnormal; Notable for the following components:    Procalcitonin 0.43 (*)     All other components within normal limits    Narrative:     As a Marker for Sepsis (Non-Neonates):    1. <0.5 ng/mL represents a low risk of severe sepsis and/or septic shock.  2. >2 ng/mL represents a high risk of severe sepsis and/or septic shock.    As a Marker for Lower Respiratory Tract Infections that require antibiotic therapy:    PCT on Admission    Antibiotic Therapy       6-12 Hrs later    >0.5                Strongly Recommended  >0.25 - <0.5        Recommended   0.1  "- 0.25          Discouraged              Remeasure/reassess PCT  <0.1                Strongly Discouraged     Remeasure/reassess PCT    As 28 day mortality risk marker: \"Change in Procalcitonin Result\" (>80% or <=80%) if Day 0 (or Day 1) and Day 4 values are available. Refer to http://www[facility]pct[facility]cal[facility]    Change in PCT <=80%  A decrease of PCT levels below or equal to 80% defines a positive change in PCT test result representing a higher risk for 28-day all-cause mortality of patients diagnosed with severe sepsis for septic shock.    Change in PCT >80%  A decrease of PCT levels of more than 80% defines a negative change in PCT result representing a lower risk for 28-day all-cause mortality of patients diagnosed with severe sepsis or septic shock.      C-REACTIVE PROTEIN - Abnormal; Notable for the following components:    C-Reactive Protein 34.44 (*)     All other components within normal limits   BLOOD GAS, ARTERIAL W/CO-OXIMETRY - Abnormal; Notable for the following components:    pH, Arterial 7.507 (*)     pCO2, Arterial 24.6 (*)     pO2, Arterial 66.4 (*)     HCO3, Arterial 19.5 (*)     Base Excess, Arterial -2.1 (*)     Hemoglobin, Blood Gas 12.9 (*)     Oxyhemoglobin 93.2 (*)     CO2 Content 20.3 (*)     pCO2, Temperature Corrected 24.6 (*)     pO2, Temperature Corrected 66.4 (*)     All other components within normal limits   RESPIRATORY PANEL PCR W/ COVID-19 (SARS-COV-2), NP SWAB IN UTM/VTP, 2 HR TAT - Normal    Narrative:     In the setting of a positive respiratory panel with a viral infection PLUS a negative procalcitonin without other underlying concern for bacterial infection, consider observing off antibiotics or discontinuation of antibiotics and continue supportive care. If the respiratory panel is positive for atypical bacterial infection (Bordetella pertussis, Chlamydophila pneumoniae, or Mycoplasma pneumoniae), consider antibiotic de-escalation to target atypical bacterial infection. "   BNP (IN-HOUSE) - Normal    Narrative:     This assay is used as an aid in the diagnosis of individuals suspected of having heart failure. It can be used as an aid in the diagnosis of acute decompensated heart failure (ADHF) in patients presenting with signs and symptoms of ADHF to the emergency department (ED). In addition, NT-proBNP of <300 pg/mL indicates ADHF is not likely.    Age Range Result Interpretation  NT-proBNP Concentration (pg/mL:      <50             Positive            >450                   Gray                 300-450                    Negative             <300    50-75           Positive            >900                  Gray                300-900                  Negative            <300      >75             Positive            >1800                  Gray                300-1800                  Negative            <300   TROPONIN - Normal    Narrative:     High Sensitive Troponin T Reference Range:  <14.0 ng/L- Negative Female for AMI  <22.0 ng/L- Negative Male for AMI  >=14 - Abnormal Female indicating possible myocardial injury.  >=22 - Abnormal Male indicating possible myocardial injury.   Clinicians would have to utilize clinical acumen, EKG, Troponin, and serial changes to determine if it is an Acute Myocardial Infarction or myocardial injury due to an underlying chronic condition.        LIPASE - Normal   ETHANOL - Normal    Narrative:     Not for legal purposes.   HIGH SENSITIVITIY TROPONIN T 1HR - Normal    Narrative:     High Sensitive Troponin T Reference Range:  <14.0 ng/L- Negative Female for AMI  <22.0 ng/L- Negative Male for AMI  >=14 - Abnormal Female indicating possible myocardial injury.  >=22 - Abnormal Male indicating possible myocardial injury.   Clinicians would have to utilize clinical acumen, EKG, Troponin, and serial changes to determine if it is an Acute Myocardial Infarction or myocardial injury due to an underlying chronic condition.        BLOOD CULTURE   BLOOD  CULTURE   RAINBOW DRAW    Narrative:     The following orders were created for panel order [place] Draw.  Procedure                               Abnormality         Status                     ---------                               -----------         ------                     Green Top (Gel)[[id number]]                                  Final result               Lavender Top[[id number]]                                     Final result               Gold Top - SST[[id number]]                                   Final result               Booth Top[[id number]]                                         Final result               Light Blue Top[[id number]]                                   Final result                 Please view results for these tests on the individual orders.   BLOOD GAS, ARTERIAL   LACTIC ACID, REFLEX   CBC AND DIFFERENTIAL    Narrative:     The following orders were created for panel order CBC & Differential.  Procedure                               Abnormality         Status                     ---------                               -----------         ------                     CBC Auto Differential[[id number]]        Abnormal            Final result                 Please view results for these tests on the individual orders.   GREEN TOP   LAVENDER TOP   GOLD TOP - SST   GRAY TOP   LIGHT BLUE TOP       Meds Given in ED:   Medications   sodium chloride 0.9 % flush 10 mL (has no administration in time range)   sodium chloride 0.9 % flush 10 mL (has no administration in time range)   cefTRIAXone (ROCEPHIN) 1 g in sodium chloride 0.9 % 100 mL MBP (0 g Intravenous Stopped 6/25/25 1728)   doxycycline (VIBRAMYCIN) 100 mg in sodium chloride 0.9 % 100 mL MBP (0 mg Intravenous Stopped 6/25/25 1802)   sepsis fluid NS 0.9 % bolus 2,430 mL (2,430 mL Intravenous New Bag 6/25/25 1647)   acetaminophen (TYLENOL) tablet 1,000 mg (1,000 mg Oral Given 6/25/25 1646)   Morphine sulfate (PF) injection 4 mg (4 mg Intravenous  Given 6/25/25 1728)   iopamidol (ISOVUE-370) 76 % injection 100 mL (85 mL Intravenous Given 6/25/25 1835)           Last NIH score:                                                          Dysphagia screening results:  Patient Factors Component (Dysphagia:Stroke or Rule-out)  Best Eye Response: 4-->(E4) spontaneous (06/25/25 1626)  Best Motor Response: 6-->(M6) obeys commands (06/25/25 1626)  Best Verbal Response: 5-->(V5) oriented (06/25/25 1626)  Mauricio Coma Scale Score: 15 (06/25/25 1626)     Mauricio Coma Scale:  No data recorded     CIWA:        Restraint Type:            Isolation Status:  No active isolations

## 2025-06-25 NOTE — TELEPHONE ENCOUNTER
Caller: ERICH PENNINGTON    Relationship to patient: Emergency Contact    Best call back number: [phone]     Chief complaint: DIFFICULTY BREATHING, PERSISTENT HICCUPS WITH SEVERE ABDOMINAL PAIN    Patient directed to call 911 or go to their nearest emergency room.     Patient verbalized understanding: [x] Yes  [] No

## 2025-06-25 NOTE — ED PROVIDER NOTES
"Subjective   History of Present Illness  This is 64-year-old male with a history of hypertension presented to the emergency department after head injury.  Patient fell on Saturday.  States that he tripped and fell backwards.  He had a laceration to the occipital region of his head.  Patient did not lose consciousness during the event.  Family concerned because he has been slightly more weak over the last few days.  He admits to alcohol use daily.  He drank today as well.  He is not having any change in vision or focal weakness.  No hallucinations.  No tremors.  Denies any chest pain shortness of breath and abdominal pain or vomiting.    History provided by:  Patient and relative   used: No        Review of Systems   Constitutional:  Positive for fatigue. Negative for chills and fever.   HENT:  Negative for congestion, ear pain and sore throat.    Eyes:  Negative for visual disturbance.   Respiratory:  Negative for shortness of breath.    Cardiovascular:  Negative for chest pain.   Gastrointestinal:  Negative for abdominal pain.   Genitourinary:  Negative for difficulty urinating.   Musculoskeletal:  Negative for arthralgias.   Skin:  Negative for rash.   Neurological:  Positive for headaches. Negative for dizziness, weakness and numbness.   Psychiatric/Behavioral:  Negative for agitation.        Past Medical History:   Diagnosis Date    Alcohol use     Hyperlipidemia     Hypertension     Neuromuscular disorder        Allergies   Allergen Reactions    Penicillins GI Intolerance     \" I VOMIT\"        Past Surgical History:   Procedure Laterality Date    CARPAL TUNNEL RELEASE Left     CARPAL TUNNEL RELEASE Right 06/12/2023    Dr. Esa Acosta    FOOT FRACTURE SURGERY Right 01/01/2018    fracture to right foot - has a metal pin on great toe    WISDOM TOOTH EXTRACTION         Family History   Problem Relation Age of Onset    Cancer Mother         small cell cancer    Cancer Father         colon " cancer       Social History     Socioeconomic History    Marital status:    Tobacco Use    Smoking status: Every Day     Current packs/day: 1.00     Average packs/day: 1 pack/day for 49.5 years (49.5 ttl pk-yrs)     Types: Cigarettes     Start date: 1976     Passive exposure: Past    Smokeless tobacco: Never    Tobacco comments:     Trying to quit smoking. Would like to go back on the nicotine patch from PCP   Vaping Use    Vaping status: Former    Substances: Nicotine    Devices: Disposable, Pre-filled or refillable cartridge   Substance and Sexual Activity    Alcohol use: Not Currently     Comment: Quit drinking. Started Revive Program    Drug use: Not Currently     Comment: alcohol    Sexual activity: Yes     Partners: Female           Objective   Physical Exam  Vitals and nursing note reviewed.   Constitutional:       General: He is not in acute distress.     Appearance: He is not ill-appearing or toxic-appearing.   HENT:      Head:      Comments: Patient is a well-healing laceration to the right occipital region.  There is no crepitus.  No fluctuance.  No evidence of skull fracture     Mouth/Throat:      Pharynx: Oropharynx is clear.   Eyes:      Conjunctiva/sclera: Conjunctivae normal.      Pupils: Pupils are equal, round, and reactive to light.   Cardiovascular:      Rate and Rhythm: Normal rate and regular rhythm.   Pulmonary:      Effort: Pulmonary effort is normal. No respiratory distress.   Abdominal:      General: Abdomen is flat. There is no distension.      Palpations: There is no mass.      Tenderness: There is no abdominal tenderness. There is no guarding or rebound.   Musculoskeletal:         General: No deformity. Normal range of motion.      Cervical back: Normal range of motion. No tenderness.   Neurological:      General: No focal deficit present.      Mental Status: He is alert.      Sensory: No sensory deficit.      Motor: No weakness.         Procedures           ED Course  ED Course  as of 06/24/25 2327 Tue Jun 24, 2025 2200 Patient was initially seen in triage on presentation.  Due to overcrowding and severe volume surges, the patient was not immediately transferred to ER bed, as there were not any currently available.  Initial medical screening exam and workup was performed in triage in order to expedite patient care.   [JK]   2220 BP: 131/61 [JK]   2220 Temp: 99.8 °F (37.7 °C) [JK]   2220 Temp src: Oral [JK]   2220 Heart Rate: 116 [JK]   2220 Resp: 16 [JK]   2220 SpO2: 92 % [JK]   2220 Device (Oxygen Therapy): room air  Interpretation:  Patient's vitals were directly viewed and interpreted by myself.   O2 sat 92% on room air, interpreted as normal.  Telemetry revealed a rate of 116 bpm, interpreted as sinus tachycardia [JK]   2323 CT Head Without Contrast [JK]   2323 CT Cervical Spine Without Contrast [JK]   2323 CT Thoracic Spine Without Contrast  Interpretation:  Imaging was directly visualized by myself, per my interpretations, CT of the head did not show any acute intracranial process.  CT of the cervical spine showed no fracture.  CT of the thoracic spine showed a chronic L1 fracture.  There is also an infiltrate in the right lower lobe concerning for pneumonia. [JK]   2323 On reevaluation, the patient is having some hiccups which he says are causing him some discomfort.  We did administer medication for this. [JK]   2323 This findings are consistent with a chronic L1 fracture as well as a pneumonia.  He said he has had some cough lately but no shortness of breath.  We will treat him for this. [JK]   2324 I had a long discussion with the patient with family member at bedside.  They are concerned about possible alcohol use as he went to rehab about a year ago and has been clean since then.  Patient adamantly denies any type of alcohol use.  He is alert and appropriate.  Answering all my questions without difficulty.  I did say that I would like to obtain some laboratory studies to  assess him for any other abnormalities.  Patient is declining at this time.  I also offered to have him evaluated by behavioral health for possible rehabilitation once blood work is back.  Patient is declining this.  He states that he is not drinking.  He does not want any further treatment and would like to be discharged home. [JK]   2324 I encouraged the patient to maintain to his antibiotic therapy.  He needs to follow-up with his PCP in 24 hours.  He is to return to the emergency department if he has any change in symptoms or concerns.  Patient verbalized understanding. [JK]   2321 I had a discussion with the patient/family regarding diagnosis, diagnostic results, treatment plan, and medications. The patient/family indicated understanding of these instructions. I spent adequate time at the bedside prior to discharge necessary to discuss the aftercare instructions, giving patient education, providing explanations of the results of our evaluations/findings, and my decision making to assure that the patient/family understand the plan of care. Time was allotted to answer questions at that time and throughout the ED course. Patient is required to maintain timely follow up, as discussed. I also discussed the potential for the development of an acute emergent condition requiring further evaluation, return to the ER, admission, or even surgical intervention.  I encouraged the patient to return to the emergency department immediately for any concerns, worsening symptoms, new complaints, or if symptoms persist and they are unable to seek follow-up in a timely fashion. The patient/family expressed understanding and agreement with this plan    Shared decision making:   After full review of the patient's clinical presentation, review of any work-up including but not limited to laboratory studies and radiology obtained, I had a discussion with the patient.  Treatment options were discussed as well as the risks, benefits and  consequences.  I discussed all findings with the patient and family members if available.  During the discussion, treatment goals were understood by all as well as any misconceptions which were addressed with the patient.  Ample time was given for any questions they may have had.  They are in agreement with the treatment plan as well as final disposition. [JK]      ED Course User Index  [JK] Desmond Panda MD                                                       Medical Decision Making  This is a 64-year-old male with a history of hypertension and alcohol use presenting to the emergency department after [employer] fall.  The patient fell backwards and hit his head.  Findings are concerning for closed head injury.  However given the patient's alcohol use and age she does meet imaging criteria for head and cervical spine based on [employer] guidelines.  Patient's neurologic exam appears normal at this time.  Workup initiated.      Differential diagnosis: Accidental fall, closed head injury, intracranial abnormality, alcohol abuse, cervical spine sprain, strain, fracture    Amount and/or Complexity of Data Reviewed  External Data Reviewed: labs, radiology, ECG and notes.     Details: External laboratories, imaging as well as notes were reviewed personally by myself.  All relevant studies were used to guide decision making.     Date of previous record: 5/27/2025    Source of note: Cardiology    Summary:  Patient was seen and evaluated for routine visit.  I did review basic laboratory studies on file as well as a previous chest x-ray and EKG.  Records reviewed    Radiology: ordered and independent interpretation performed. Decision-making details documented in ED Course.    Risk  OTC drugs.  Prescription drug management.        Final diagnoses:   Accidental fall, initial encounter   Pneumonia of right lower lobe due to infectious organism   Compression fracture of L1 vertebra with routine healing, subsequent encounter    History of alcohol abuse   Chronic obstructive pulmonary disease, unspecified COPD type   Current smoker       ED Disposition  ED Disposition       ED Disposition   Discharge    Condition   Stable    Comment   --               Concepción Quigley DO  [address] [facility] [address]  [place] [address]  [phone]    Call in 1 day           Medication List        New Prescriptions      acetaminophen 500 MG tablet  Commonly known as: TYLENOL  Take 1 tablet by mouth Every 6 (Six) Hours As Needed for Mild Pain for up to 5 days.  Replaces: acetaminophen 650 MG 8 hr tablet            Stop      acetaminophen 650 MG 8 hr tablet  Commonly known as: TYLENOL  Replaced by: acetaminophen 500 MG tablet               Where to Get Your Medications        These medications were sent to [facility] [id number] - [place], [place] - [address] - [phone]  - [phone]   [address] [address]      Phone: [phone]   acetaminophen 500 MG tablet            Desmond Panda MD  06/24/25 4016

## 2025-06-25 NOTE — H&P
"    [facility]  HISTORY AND PHYSICAL    Patient Name: Oscar Avila  : 1960  MRN: [id number]  Primary Care Physician: Concepción Quigley DO  Date of admission: 2025    Subjective   Subjective     Chief Complaint:  Shortness of breath, coughing     HPI:  Oscar Avila is a 64 y.o. male w/ a hx of COPD, ongoing tobacco use, HTN, HLD who presented to the ED w/ c/o shortness of breath and coughing.   Pt evaluated in the ED last night, 25. Pt presented to the ED after falling. Pt had fallen on Friday night and hit his head and then he fell again last night. Pt reported feeling \"dizzy\". CT Head, CT Cervical Spine and CT Thoracic Spine all without acute findings except for a possible RLL Pneumonia. Pt ultimately d/c home.   Pt presented to the ED today w/ c/o shortness of breath and coughing x 1 week. Pt reports feeling feverish today and also c/o persistent hiccups. Pt reports that he has severe left sided abdominal pain w/ hiccups and w/ coughing.   Pt denies chest pain, N/V/D, urinary symptoms, edema, syncope.   Pt evaluated in the ED. CTA Chest concerning for RLL Pneumonia and bronchitis. ABG c/w hypoxia. Lactic acid, WBC, procal and CRP all elevated. Pt noted to be 87% on room air. Pt admitted to the hospital medicine service for further evaluation.     Review of Systems   Constitutional:  Positive for chills, fatigue and fever.   HENT: Negative.  Negative for congestion, postnasal drip, rhinorrhea, sinus pressure, sinus pain, sneezing and trouble swallowing.    Eyes: Negative.  Negative for visual disturbance.   Respiratory:  Positive for cough and shortness of breath.    Cardiovascular: Negative.  Negative for chest pain, palpitations and leg swelling.   Gastrointestinal:  Positive for abdominal pain. Negative for abdominal distention, constipation, diarrhea, nausea and vomiting.   Endocrine: Negative.    Genitourinary: Negative.  Negative for " "difficulty urinating, dysuria, flank pain, frequency and urgency.   Musculoskeletal: Negative.  Negative for arthralgias and myalgias.   Skin: Negative.  Negative for wound.   Allergic/Immunologic: Negative.  Negative for immunocompromised state.   Neurological:  Positive for dizziness. Negative for syncope, weakness, light-headedness and headaches.   Hematological: Negative.  Does not bruise/bleed easily.   Psychiatric/Behavioral: Negative.  Negative for confusion.    All other systems reviewed and are negative.     Personal History     Past Medical History:   Diagnosis Date   • Alcohol use    • Hyperlipidemia    • Hypertension    • Neuromuscular disorder      Past Surgical History:   Procedure Laterality Date   • CARPAL TUNNEL RELEASE Left    • CARPAL TUNNEL RELEASE Right 06/12/2023    Dr. Esa Acosta   • FOOT FRACTURE SURGERY Right 01/01/2018    fracture to right foot - has a metal pin on great toe   • WISDOM TOOTH EXTRACTION       Family History: family history includes Cancer in his father and mother.     Social History:  reports that he has been smoking cigarettes. He started smoking about 49 years ago. He has a 49.5 pack-year smoking history. He has been exposed to tobacco smoke. He has never used smokeless tobacco. He reports that he does not currently use alcohol. He reports that he does not currently use drugs.  Social History     Social History Narrative         1 child 1986 female         Retired [employer]     Drinks alcohol          Medications:  DULoxetine, Fluticasone-Salmeterol, acetaminophen, albuterol sulfate HFA, aspirin, atorvastatin, azelastine, docusate sodium, fluticasone, folic acid, ibuprofen, lisinopril, metoprolol succinate XL, multivitamin with minerals, nicotine, nicotine polacrilex, omeprazole, prazosin, and thiamine    Allergies   Allergen Reactions   • Penicillins GI Intolerance     \" I VOMIT\"      Objective   Objective     Vital Signs:   Temp:  [100.5 °F (38.1 " °C)-102.3 °F (39.1 °C)] 102.3 °F (39.1 °C)  Heart Rate:  [] 89  Resp:  [28] 28  BP: (102-151)/(57-86) 102/67  Flow (L/min) (Oxygen Therapy):  [2] 2    Physical Exam     Constitutional: Awake, alert; ill appearing   Eyes: PERRLA, sclerae anicteric, no conjunctival injection  HENT: NCAT, mucous membranes dry   Neck: Supple, no thyromegaly, no lymphadenopathy, trachea midline  Respiratory: Course/diminished throughout w/ faint rhonchi, no wheeze, nonlabored respirations, non-productive cough throughout exam   Cardiovascular: RRR, no murmurs, rubs, or gallops, no peripheral edema   Gastrointestinal: Positive bowel sounds, soft, nontender, nondistended  Musculoskeletal: Normal ROM bilaterally   Psychiatric: Appropriate affect, cooperative  Neurologic: Oriented x 3, strength symmetric in all extremities, speech clear  Skin: No rashes, lesions or wounds     Result Review:  I have personally reviewed the results from the time of this admission to 6/25/2025 20:29 EDT and agree with these findings:  [x]  Laboratory list / accordion  []  Microbiology  [x]  Radiology  [x]  EKG/Telemetry   []  Cardiology/Vascular   []  Pathology  [x]  Old records    LAB RESULTS:      Lab 06/25/25  1924 06/25/25  1539   WBC  --  17.53*   HEMOGLOBIN  --  13.2   HEMATOCRIT  --  39.4   PLATELETS  --  280   NEUTROS ABS  --  15.49*   IMMATURE GRANS (ABS)  --  0.09*   LYMPHS ABS  --  0.50*   MONOS ABS  --  1.43*   EOS ABS  --  0.00   MCV  --  98.0*   CRP  --  34.44*   PROCALCITONIN  --  0.43*   LACTATE 0.9 2.1*         Lab 06/25/25  1539   SODIUM 128*   POTASSIUM 5.0   CHLORIDE 94*   CO2 20.0*   ANION GAP 14.0   BUN 23.6*   CREATININE 1.17   EGFR 69.2   GLUCOSE 97   CALCIUM 8.6         Lab 06/25/25  1539   TOTAL PROTEIN 7.1   ALBUMIN 3.6   GLOBULIN 3.5   ALT (SGPT) 9   AST (SGOT) 21   BILIRUBIN 0.4   ALK PHOS 94   LIPASE 22         Lab 06/25/25  1649 06/25/25  1539   PROBNP  --  483.8   HSTROP T 15 14                 Lab 06/25/25  1636   PH,  ARTERIAL 7.507*   PCO2, ARTERIAL 24.6*   PO2 ART 66.4*   FIO2 28   HCO3 ART 19.5*   BASE EXCESS ART -2.1*   CARBOXYHEMOGLOBIN 1.8     Brief Urine Lab Results       None          Microbiology Results (last 10 days)       Procedure Component Value - Date/Time    Respiratory Panel PCR w/COVID-19(SARS-CoV-2) PATY/JACKIE/HALLIE/PAD/COR/FLORIAN In-House, NP Swab in UTM/VTM, 2 HR TAT - Swab, Nasopharynx [[id number]]  (Normal) Collected: 06/25/25 1647    Lab Status: Final result Specimen: Swab from Nasopharynx Updated: 06/25/25 5421     ADENOVIRUS, PCR Not Detected     Coronavirus 229E Not Detected     Coronavirus HKU1 Not Detected     Coronavirus NL63 Not Detected     Coronavirus OC43 Not Detected     COVID19 Not Detected     Human Metapneumovirus Not Detected     Human Rhinovirus/Enterovirus Not Detected     Influenza A PCR Not Detected     Influenza B PCR Not Detected     Parainfluenza Virus 1 Not Detected     Parainfluenza Virus 2 Not Detected     Parainfluenza Virus 3 Not Detected     Parainfluenza Virus 4 Not Detected     RSV, PCR Not Detected     Bordetella pertussis pcr Not Detected     Bordetella parapertussis PCR Not Detected     Chlamydophila pneumoniae PCR Not Detected     Mycoplasma pneumo by PCR Not Detected    Narrative:      In the setting of a positive respiratory panel with a viral infection PLUS a negative procalcitonin without other underlying concern for bacterial infection, consider observing off antibiotics or discontinuation of antibiotics and continue supportive care. If the respiratory panel is positive for atypical bacterial infection (Bordetella pertussis, Chlamydophila pneumoniae, or Mycoplasma pneumoniae), consider antibiotic de-escalation to target atypical bacterial infection.          CT Angiogram Chest Pulmonary Embolism  Result Date: 6/25/2025  CT ANGIOGRAM CHEST PULMONARY EMBOLISM Date of Exam: 6/25/2025 6:29 PM EDT Indication: Pulmonary Embolism. Comparison: 5/23/2024 Technique: Axial CT images were  obtained of the chest after the uneventful intravenous administration of 80 cc Isovue-370 IV contrast utilizing pulmonary embolism protocol.  In addition, a 3-D volume rendered image was created for interpretation.  Reconstructed coronal and sagittal images were also obtained. Automated exposure control and iterative construction methods were used. Findings: The thyroid gland is normal. Emphysema. The airway is clear. Atheromatous disease of the coronary vessels. No pulmonary embolus. Right lower lobe posterior consolidation with bronchial wall thickening concerning for bronchitis. Dependent atelectatic changes noted on the left. Mild chronic anterior wedging of L1. Mild chronic anterior wedging of T7-T9. Exaggerated kyphosis of the thoracic spine. The sternum is intact. No acute rib fractures.     Impression: No pulmonary embolus. Right lower lobe consolidation with bronchial wall thickening concerning for bronchitis. Electronically Signed: Javier Mohan MD  6/25/2025 7:13 PM EDT  Workstation ID: [id number]    XR Chest 1 View  Result Date: 6/25/2025  XR CHEST 1 VW Date of Exam: 6/25/2025 3:44 PM EDT Indication: SOA triage protocol Comparison: Chest CT 5/23/2024, chest radiograph 11/23/2020. Findings: Cardiomediastinal silhouette is unchanged. Emphysematous changes of the lungs. Mild interstitial changes in the right and left lung bases. No pleural effusion or pneumothorax. Osseous structures are unchanged.     Impression: Impression: Mild interstitial changes in the right greater than left lung bases, which may reflect an infectious/inflammatory process. Background of emphysema. Electronically Signed: Iron Calderón MD  6/25/2025 4:07 PM EDT  Workstation ID: [id number]    CT Head Without Contrast  Result Date: 6/24/2025  CT HEAD WO CONTRAST, CT CERVICAL SPINE WO CONTRAST, CT THORACIC SPINE WO CONTRAST Date of Exam: 6/24/2025 10:06 PM EDT Indication: fall, trauma. Comparison: Head CT 11/23/2020. Chest CT 5/23/2024.  Technique: Axial CT images were obtained of the head, cervical spine, and thoracic spine without contrast administration.  Automated exposure control and iterative construction methods were used. Findings: Head CT: No acute intracranial hemorrhage.Intact appearing gray-white differentiation.No extra-axial fluid collection.No significant mass effect. No hydrocephalus. Mild generalized parenchymal volume loss. Scattered areas of periventricular and subcortical white matter hypoattenuation, nonspecific, perhaps from small vessel ischemic/hypertensive changes in a patient of this age.There are intracranial atherosclerotic calcifications. Mild scattered mucosal thickening in the paranasal sinuses.Mastoid air cells are essentially clear.Included globes and orbits appear unremarkable by CT. Left frontal scalp edema/contusion. No acute calvarial fracture seen. Cervical spine CT: There are 7 cervical type vertebral bodies. No acute fracture or traumatic malalignment. Straightening of the normal cervical lordosis.. Grade 1 anterolisthesis of C5 on C6. Multilevel degenerative endplate changes. Vertebral body heights otherwise appear maintained. There are multilevel degenerative changes of the cervical spine including disc osteophytes, uncovertebral hypertrophy, and facet arthropathy. Mild multifocal spinal canal stenosis. Multilevel moderate to severe neural foraminal narrowing. There is no significant prevertebral soft tissue edema.. Vascular calcifications. Scattered atelectasis and/or scarring in the imaged lungs. Thoracic spine CT: There are 12 rib-bearing vertebral bodies. No acute fracture or traumatic malalignment.. There is a chronic appearing compression fracture at L1. There is also multilevel chronic appearing anterior wedging in the mid thoracic vertebrae. There is exaggerated thoracic kyphosis. Multilevel degenerative endplate changes. Vertebral body heights are otherwise maintained. Multilevel degenerative  changes including disc height loss, osteophytes, and facet arthropathy. Mild multilevel spinal canal stenosis. Mild to moderate multilevel foraminal narrowing. Consolidative opacities in the right lower lobe. Vascular calcifications.     Impression: Impression: HEAD CT: No acute intracranial findings. CERVICAL SPINE CT: No acute fracture or traumatic malalignment. THORACIC SPINE CT: No acute fracture or traumatic malalignment. Chronic appearing compression fracture at L1 and multilevel chronic anterior wedging in the midthoracic vertebrae. Consolidative opacities in the right lower lobe, suspicious for aspiration or pneumonia. Electronically Signed: Richy Monsivais MD  6/24/2025 10:38 PM EDT  Workstation ID: [id number]    CT Cervical Spine Without Contrast  Result Date: 6/24/2025  CT HEAD WO CONTRAST, CT CERVICAL SPINE WO CONTRAST, CT THORACIC SPINE WO CONTRAST Date of Exam: 6/24/2025 10:06 PM EDT Indication: fall, trauma. Comparison: Head CT 11/23/2020. Chest CT 5/23/2024. Technique: Axial CT images were obtained of the head, cervical spine, and thoracic spine without contrast administration.  Automated exposure control and iterative construction methods were used. Findings: Head CT: No acute intracranial hemorrhage.Intact appearing gray-white differentiation.No extra-axial fluid collection.No significant mass effect. No hydrocephalus. Mild generalized parenchymal volume loss. Scattered areas of periventricular and subcortical white matter hypoattenuation, nonspecific, perhaps from small vessel ischemic/hypertensive changes in a patient of this age.There are intracranial atherosclerotic calcifications. Mild scattered mucosal thickening in the paranasal sinuses.Mastoid air cells are essentially clear.Included globes and orbits appear unremarkable by CT. Left frontal scalp edema/contusion. No acute calvarial fracture seen. Cervical spine CT: There are 7 cervical type vertebral bodies. No acute fracture or traumatic  malalignment. Straightening of the normal cervical lordosis.. Grade 1 anterolisthesis of C5 on C6. Multilevel degenerative endplate changes. Vertebral body heights otherwise appear maintained. There are multilevel degenerative changes of the cervical spine including disc osteophytes, uncovertebral hypertrophy, and facet arthropathy. Mild multifocal spinal canal stenosis. Multilevel moderate to severe neural foraminal narrowing. There is no significant prevertebral soft tissue edema.. Vascular calcifications. Scattered atelectasis and/or scarring in the imaged lungs. Thoracic spine CT: There are 12 rib-bearing vertebral bodies. No acute fracture or traumatic malalignment.. There is a chronic appearing compression fracture at L1. There is also multilevel chronic appearing anterior wedging in the mid thoracic vertebrae. There is exaggerated thoracic kyphosis. Multilevel degenerative endplate changes. Vertebral body heights are otherwise maintained. Multilevel degenerative changes including disc height loss, osteophytes, and facet arthropathy. Mild multilevel spinal canal stenosis. Mild to moderate multilevel foraminal narrowing. Consolidative opacities in the right lower lobe. Vascular calcifications.     Impression: Impression: HEAD CT: No acute intracranial findings. CERVICAL SPINE CT: No acute fracture or traumatic malalignment. THORACIC SPINE CT: No acute fracture or traumatic malalignment. Chronic appearing compression fracture at L1 and multilevel chronic anterior wedging in the midthoracic vertebrae. Consolidative opacities in the right lower lobe, suspicious for aspiration or pneumonia. Electronically Signed: Richy Monsivais MD  6/24/2025 10:38 PM EDT  Workstation ID: [id number]    CT Thoracic Spine Without Contrast  Result Date: 6/24/2025  CT HEAD WO CONTRAST, CT CERVICAL SPINE WO CONTRAST, CT THORACIC SPINE WO CONTRAST Date of Exam: 6/24/2025 10:06 PM EDT Indication: fall, trauma. Comparison: Head CT  11/23/2020. Chest CT 5/23/2024. Technique: Axial CT images were obtained of the head, cervical spine, and thoracic spine without contrast administration.  Automated exposure control and iterative construction methods were used. Findings: Head CT: No acute intracranial hemorrhage.Intact appearing gray-white differentiation.No extra-axial fluid collection.No significant mass effect. No hydrocephalus. Mild generalized parenchymal volume loss. Scattered areas of periventricular and subcortical white matter hypoattenuation, nonspecific, perhaps from small vessel ischemic/hypertensive changes in a patient of this age.There are intracranial atherosclerotic calcifications. Mild scattered mucosal thickening in the paranasal sinuses.Mastoid air cells are essentially clear.Included globes and orbits appear unremarkable by CT. Left frontal scalp edema/contusion. No acute calvarial fracture seen. Cervical spine CT: There are 7 cervical type vertebral bodies. No acute fracture or traumatic malalignment. Straightening of the normal cervical lordosis.. Grade 1 anterolisthesis of C5 on C6. Multilevel degenerative endplate changes. Vertebral body heights otherwise appear maintained. There are multilevel degenerative changes of the cervical spine including disc osteophytes, uncovertebral hypertrophy, and facet arthropathy. Mild multifocal spinal canal stenosis. Multilevel moderate to severe neural foraminal narrowing. There is no significant prevertebral soft tissue edema.. Vascular calcifications. Scattered atelectasis and/or scarring in the imaged lungs. Thoracic spine CT: There are 12 rib-bearing vertebral bodies. No acute fracture or traumatic malalignment.. There is a chronic appearing compression fracture at L1. There is also multilevel chronic appearing anterior wedging in the mid thoracic vertebrae. There is exaggerated thoracic kyphosis. Multilevel degenerative endplate changes. Vertebral body heights are otherwise  maintained. Multilevel degenerative changes including disc height loss, osteophytes, and facet arthropathy. Mild multilevel spinal canal stenosis. Mild to moderate multilevel foraminal narrowing. Consolidative opacities in the right lower lobe. Vascular calcifications.     Impression: Impression: HEAD CT: No acute intracranial findings. CERVICAL SPINE CT: No acute fracture or traumatic malalignment. THORACIC SPINE CT: No acute fracture or traumatic malalignment. Chronic appearing compression fracture at L1 and multilevel chronic anterior wedging in the midthoracic vertebrae. Consolidative opacities in the right lower lobe, suspicious for aspiration or pneumonia. Electronically Signed: Richy Monsivais MD  6/24/2025 10:38 PM EDT  Workstation ID: [id number]    Results for orders placed during the hospital encounter of 04/25/23    Adult Transthoracic Echo Complete W/ Cont if Necessary Per Protocol    Interpretation Summary  •  Left ventricular ejection fraction appears to be 56 - 60%.  •  The left ventricular cavity is small in size. Left ventricular diastolic function was normal.  •  No significant valvular stenosis or regurgitation.    Assessment & Plan   Assessment & Plan       Acute hypoxic respiratory failure    Hyperlipidemia    Primary hypertension    Sepsis    Pneumonia    Hyponatremia    Abdominal pain    Bronchitis    COPD (chronic obstructive pulmonary disease)    Tobacco abuse    GERD without esophagitis    Oscar Avila is a 64 y.o. male w/ a hx of COPD, ongoing tobacco use, HTN, HLD who presented to the ED w/ c/o shortness of breath and coughing.     **Acute hypoxic respiratory failure   **Sepsis 2/2 Pneumonia   **Bronchitis/Hx of COPD   **Tobacco use  -meets sepsis criteria based on HR 112bpm, temp 102.3, 87% on RA, WBC 17.53, CRP 34.44. procal 0.43, lactic acid 2.1 and + source (PNA per CTA Chest)   -noted to be 87% on room air  -ABG c/w hypoxia (pH 7.5, CO2 24, O2 66.4)  -CTA Chest: RLL pneumonia,  bronchitis   -bedside dysphagia screen   -blood cx pending   -sputum cx pending   -respiratory panel negative   -Doxycycline and Rocephin started in ED; continue   -s/p NS 2,430ml given in ED  -continue NS @ 75ml/hour x 12 hours  -prn and scheduled nebs   -continue Flonase, Advair/Wixela (sub Symbicort)  -smoking cessation education   -nicotine patch   -pt/ot and case mgt consult     **Hyponatremia   -sodium 128 (139 yesterday at 11am)  -serial sodium levels   -serum osmo, urine osmo, urine sodium pending   -s/p 2,430ml NS bolus given in ED  -continue NS @ 75ml/hour x 12 hours (adjust as needed)     **Abdominal pain   -likely musculoskeletal in nature; abdominal exam benign, pt c/o pain w/ movement, coughing and w/ hiccups   -pt also reported falling on Friday and again Tuesday night- evaluated in ED Tuesday afternoon   -CT abd/pel wo pending   -symptom mgt     **HTN  **HLD  -EKG stable  -proBNP and troponin both WNL   -continue routine statin   -continue Toprol, lisinopril, prazosin w/ hold parameters     **GERD  -continue PPI    DVT prophylaxis:  Mechanical     CODE STATUS:    Code Status (Patient has no pulse and is not breathing): CPR (Attempt to Resuscitate)  Medical Interventions (Patient has pulse or is breathing): Full Support    Expected Discharge  Expected Discharge Date: 6/28/2025; Expected Discharge Time:     Signature: Electronically signed by DEONDRE Valero, 06/25/25, 8:29 PM EDT.    Time spent: 55 minutes

## 2025-06-26 ENCOUNTER — APPOINTMENT (OUTPATIENT)
Dept: CARDIOLOGY | Facility: HOSPITAL | Age: 65
End: 2025-06-26
Payer: MEDICARE

## 2025-06-26 PROBLEM — K56.7 ILEUS: Status: ACTIVE | Noted: 2025-06-26

## 2025-06-26 PROBLEM — N39.0 ACUTE UTI (URINARY TRACT INFECTION): Status: ACTIVE | Noted: 2025-06-26

## 2025-06-26 LAB
ANION GAP SERPL CALCULATED.3IONS-SCNC: 15.9 MMOL/L (ref 5–15)
AORTIC DIMENSIONLESS INDEX: 1.01 (DI)
AV MEAN PRESS GRAD SYS DOP V1V2: 5 MMHG
AV VMAX SYS DOP: 146.9 CM/SEC
BACTERIA UR QL AUTO: ABNORMAL /HPF
BASOPHILS # BLD AUTO: 0.03 10*3/MM3 (ref 0–0.2)
BASOPHILS NFR BLD AUTO: 0.2 % (ref 0–1.5)
BH CV ECHO MEAS - AO MAX PG: 8.6 MMHG
BH CV ECHO MEAS - AO ROOT DIAM: 2.7 CM
BH CV ECHO MEAS - AO V2 VTI: 27.2 CM
BH CV ECHO MEAS - AVA(I,D): 3.2 CM2
BH CV ECHO MEAS - IVS/LVPW: 1 CM
BH CV ECHO MEAS - IVSD: 1 CM
BH CV ECHO MEAS - LA DIMENSION: 4.7 CM
BH CV ECHO MEAS - LAT PEAK E' VEL: 9.6 CM/SEC
BH CV ECHO MEAS - LV MAX PG: 8.5 MMHG
BH CV ECHO MEAS - LV MEAN PG: 4.4 MMHG
BH CV ECHO MEAS - LV V1 MAX: 146 CM/SEC
BH CV ECHO MEAS - LV V1 VTI: 27.4 CM
BH CV ECHO MEAS - LVIDD: 4.7 CM
BH CV ECHO MEAS - LVIDS: 3.3 CM
BH CV ECHO MEAS - LVOT AREA: 3.1 CM2
BH CV ECHO MEAS - LVOT DIAM: 2 CM
BH CV ECHO MEAS - LVPWD: 1 CM
BH CV ECHO MEAS - MED PEAK E' VEL: 12.2 CM/SEC
BH CV ECHO MEAS - MV A MAX VEL: 77.2 CM/SEC
BH CV ECHO MEAS - MV DEC SLOPE: 347.3 CM/SEC2
BH CV ECHO MEAS - MV E MAX VEL: 105 CM/SEC
BH CV ECHO MEAS - MV E/A: 1.36
BH CV ECHO MEAS - MV MAX PG: 4 MMHG
BH CV ECHO MEAS - MV MEAN PG: 2.2 MMHG
BH CV ECHO MEAS - MV P1/2T: 81 MSEC
BH CV ECHO MEAS - MV V2 VTI: 24.8 CM
BH CV ECHO MEAS - MVA(P1/2T): 2.7 CM2
BH CV ECHO MEAS - MVA(VTI): 3.5 CM2
BH CV ECHO MEAS - PA ACC TIME: 0.16 SEC
BH CV ECHO MEAS - PA V2 MAX: 89.8 CM/SEC
BH CV ECHO MEAS - SV(LVOT): 86.2 ML
BH CV ECHO MEAS - SVI(LVOT): 42.6 ML/M2
BH CV ECHO MEAS - TAPSE (>1.6): 2.5 CM
BH CV ECHO MEASUREMENTS AVERAGE E/E' RATIO: 9.63
BH CV VAS BP LEFT ARM: NORMAL MMHG
BH CV XLRA - RV BASE: 4.4 CM
BH CV XLRA - RV LENGTH: 6.7 CM
BH CV XLRA - RV MID: 3.3 CM
BH CV XLRA - TDI S': 15.1 CM/SEC
BUN SERPL-MCNC: 18.9 MG/DL (ref 8–23)
BUN/CREAT SERPL: 17.7 (ref 7–25)
CALCIUM SPEC-SCNC: 8.3 MG/DL (ref 8.6–10.5)
CHLORIDE SERPL-SCNC: 99 MMOL/L (ref 98–107)
CO2 SERPL-SCNC: 16.1 MMOL/L (ref 22–29)
CREAT SERPL-MCNC: 1.07 MG/DL (ref 0.76–1.27)
DEPRECATED RDW RBC AUTO: 47.4 FL (ref 37–54)
EGFRCR SERPLBLD CKD-EPI 2021: 77 ML/MIN/1.73
EOSINOPHIL # BLD AUTO: 0 10*3/MM3 (ref 0–0.4)
EOSINOPHIL NFR BLD AUTO: 0 % (ref 0.3–6.2)
ERYTHROCYTE [DISTWIDTH] IN BLOOD BY AUTOMATED COUNT: 12.5 % (ref 12.3–15.4)
GLUCOSE SERPL-MCNC: 112 MG/DL (ref 65–99)
HCT VFR BLD AUTO: 40.6 % (ref 37.5–51)
HGB BLD-MCNC: 13.1 G/DL (ref 13–17.7)
HYALINE CASTS UR QL AUTO: ABNORMAL /LPF
IMM GRANULOCYTES # BLD AUTO: 0.07 10*3/MM3 (ref 0–0.05)
IMM GRANULOCYTES NFR BLD AUTO: 0.5 % (ref 0–0.5)
IVRT: 104 MS
LYMPHOCYTES # BLD AUTO: 0.61 10*3/MM3 (ref 0.7–3.1)
LYMPHOCYTES NFR BLD AUTO: 4.2 % (ref 19.6–45.3)
MAGNESIUM SERPL-MCNC: 2 MG/DL (ref 1.6–2.4)
MCH RBC QN AUTO: 32.8 PG (ref 26.6–33)
MCHC RBC AUTO-ENTMCNC: 32.3 G/DL (ref 31.5–35.7)
MCV RBC AUTO: 101.8 FL (ref 79–97)
MONOCYTES # BLD AUTO: 1 10*3/MM3 (ref 0.1–0.9)
MONOCYTES NFR BLD AUTO: 6.8 % (ref 5–12)
MRSA DNA SPEC QL NAA+PROBE: NEGATIVE
NEUTROPHILS NFR BLD AUTO: 12.9 10*3/MM3 (ref 1.7–7)
NEUTROPHILS NFR BLD AUTO: 88.3 % (ref 42.7–76)
NRBC BLD AUTO-RTO: 0 /100 WBC (ref 0–0.2)
OSMOLALITY SERPL: 279 MOSM/KG (ref 275–295)
OSMOLALITY UR: 731 MOSM/KG (ref 300–1100)
PLATELET # BLD AUTO: 263 10*3/MM3 (ref 140–450)
PMV BLD AUTO: 9.5 FL (ref 6–12)
POTASSIUM SERPL-SCNC: 4.5 MMOL/L (ref 3.5–5.2)
QT INTERVAL: 286 MS
QT INTERVAL: 300 MS
QT INTERVAL: 324 MS
QTC INTERVAL: 394 MS
QTC INTERVAL: 398 MS
QTC INTERVAL: 413 MS
RBC # BLD AUTO: 3.99 10*6/MM3 (ref 4.14–5.8)
RBC # UR STRIP: ABNORMAL /HPF
REF LAB TEST METHOD: ABNORMAL
SODIUM SERPL-SCNC: 126 MMOL/L (ref 136–145)
SODIUM SERPL-SCNC: 127 MMOL/L (ref 136–145)
SODIUM SERPL-SCNC: 131 MMOL/L (ref 136–145)
SQUAMOUS #/AREA URNS HPF: ABNORMAL /HPF
WBC # UR STRIP: ABNORMAL /HPF
WBC NRBC COR # BLD AUTO: 14.61 10*3/MM3 (ref 3.4–10.8)

## 2025-06-26 PROCEDURE — 97166 OT EVAL MOD COMPLEX 45 MIN: CPT

## 2025-06-26 PROCEDURE — 94799 UNLISTED PULMONARY SVC/PX: CPT

## 2025-06-26 PROCEDURE — 87641 MR-STAPH DNA AMP PROBE: CPT

## 2025-06-26 PROCEDURE — 84295 ASSAY OF SERUM SODIUM: CPT | Performed by: NURSE PRACTITIONER

## 2025-06-26 PROCEDURE — 93010 ELECTROCARDIOGRAM REPORT: CPT | Performed by: INTERNAL MEDICINE

## 2025-06-26 PROCEDURE — 25010000002 HYDROMORPHONE PER 4 MG: Performed by: FAMILY MEDICINE

## 2025-06-26 PROCEDURE — 83930 ASSAY OF BLOOD OSMOLALITY: CPT | Performed by: NURSE PRACTITIONER

## 2025-06-26 PROCEDURE — 94761 N-INVAS EAR/PLS OXIMETRY MLT: CPT

## 2025-06-26 PROCEDURE — 94664 DEMO&/EVAL PT USE INHALER: CPT

## 2025-06-26 PROCEDURE — 99233 SBSQ HOSP IP/OBS HIGH 50: CPT

## 2025-06-26 PROCEDURE — 80048 BASIC METABOLIC PNL TOTAL CA: CPT | Performed by: NURSE PRACTITIONER

## 2025-06-26 PROCEDURE — 25010000002 DOXYCYCLINE 100 MG RECONSTITUTED SOLUTION 1 EACH VIAL: Performed by: NURSE PRACTITIONER

## 2025-06-26 PROCEDURE — 25010000002 METHYLPREDNISOLONE PER 40 MG

## 2025-06-26 PROCEDURE — 87449 NOS EACH ORGANISM AG IA: CPT

## 2025-06-26 PROCEDURE — 25010000002 CEFTRIAXONE PER 250 MG: Performed by: NURSE PRACTITIONER

## 2025-06-26 PROCEDURE — 93306 TTE W/DOPPLER COMPLETE: CPT | Performed by: INTERNAL MEDICINE

## 2025-06-26 PROCEDURE — 87899 AGENT NOS ASSAY W/OPTIC: CPT

## 2025-06-26 PROCEDURE — 25810000003 SODIUM CHLORIDE 0.9 % SOLUTION: Performed by: NURSE PRACTITIONER

## 2025-06-26 PROCEDURE — 93005 ELECTROCARDIOGRAM TRACING: CPT | Performed by: NURSE PRACTITIONER

## 2025-06-26 PROCEDURE — 83735 ASSAY OF MAGNESIUM: CPT | Performed by: NURSE PRACTITIONER

## 2025-06-26 PROCEDURE — 93306 TTE W/DOPPLER COMPLETE: CPT

## 2025-06-26 PROCEDURE — 85025 COMPLETE CBC W/AUTO DIFF WBC: CPT | Performed by: NURSE PRACTITIONER

## 2025-06-26 RX ORDER — BACLOFEN 10 MG/1
5 TABLET ORAL 3 TIMES DAILY
Status: DISCONTINUED | OUTPATIENT
Start: 2025-06-26 | End: 2025-07-03 | Stop reason: HOSPADM

## 2025-06-26 RX ORDER — DOXYCYCLINE 100 MG/1
100 CAPSULE ORAL EVERY 12 HOURS SCHEDULED
Status: DISCONTINUED | OUTPATIENT
Start: 2025-06-26 | End: 2025-06-27

## 2025-06-26 RX ORDER — METHYLPREDNISOLONE SODIUM SUCCINATE 40 MG/ML
40 INJECTION, POWDER, LYOPHILIZED, FOR SOLUTION INTRAMUSCULAR; INTRAVENOUS DAILY
Status: COMPLETED | OUTPATIENT
Start: 2025-06-26 | End: 2025-06-30

## 2025-06-26 RX ADMIN — METHYLPREDNISOLONE SODIUM SUCCINATE 40 MG: 40 INJECTION, POWDER, FOR SOLUTION INTRAMUSCULAR; INTRAVENOUS at 11:26

## 2025-06-26 RX ADMIN — IPRATROPIUM BROMIDE AND ALBUTEROL SULFATE 3 ML: 2.5; .5 SOLUTION RESPIRATORY (INHALATION) at 04:47

## 2025-06-26 RX ADMIN — FLUTICASONE PROPIONATE 2 SPRAY: 50 SPRAY, METERED NASAL at 08:50

## 2025-06-26 RX ADMIN — ATORVASTATIN CALCIUM 40 MG: 40 TABLET, FILM COATED ORAL at 00:21

## 2025-06-26 RX ADMIN — HYDROMORPHONE HYDROCHLORIDE 0.5 MG: 1 INJECTION, SOLUTION INTRAMUSCULAR; INTRAVENOUS; SUBCUTANEOUS at 01:19

## 2025-06-26 RX ADMIN — LISINOPRIL 10 MG: 10 TABLET ORAL at 08:49

## 2025-06-26 RX ADMIN — IPRATROPIUM BROMIDE AND ALBUTEROL SULFATE 3 ML: 2.5; .5 SOLUTION RESPIRATORY (INHALATION) at 16:23

## 2025-06-26 RX ADMIN — IPRATROPIUM BROMIDE AND ALBUTEROL SULFATE 3 ML: 2.5; .5 SOLUTION RESPIRATORY (INHALATION) at 08:25

## 2025-06-26 RX ADMIN — HYDROMORPHONE HYDROCHLORIDE 0.5 MG: 1 INJECTION, SOLUTION INTRAMUSCULAR; INTRAVENOUS; SUBCUTANEOUS at 04:43

## 2025-06-26 RX ADMIN — METOPROLOL SUCCINATE 25 MG: 25 TABLET, EXTENDED RELEASE ORAL at 08:49

## 2025-06-26 RX ADMIN — SODIUM CHLORIDE 75 ML/HR: 9 INJECTION, SOLUTION INTRAVENOUS at 01:07

## 2025-06-26 RX ADMIN — PRAZOSIN HYDROCHLORIDE 2 MG: 1 CAPSULE ORAL at 20:22

## 2025-06-26 RX ADMIN — BACLOFEN 5 MG: 10 TABLET ORAL at 20:23

## 2025-06-26 RX ADMIN — DOXYCYCLINE 100 MG: 100 INJECTION, POWDER, LYOPHILIZED, FOR SOLUTION INTRAVENOUS at 05:47

## 2025-06-26 RX ADMIN — Medication 10 ML: at 20:24

## 2025-06-26 RX ADMIN — BUDESONIDE AND FORMOTEROL FUMARATE DIHYDRATE 2 PUFF: 160; 4.5 AEROSOL RESPIRATORY (INHALATION) at 08:25

## 2025-06-26 RX ADMIN — Medication 10 ML: at 09:40

## 2025-06-26 RX ADMIN — PRAZOSIN HYDROCHLORIDE 2 MG: 1 CAPSULE ORAL at 01:04

## 2025-06-26 RX ADMIN — DULOXETINE 60 MG: 60 CAPSULE, DELAYED RELEASE ORAL at 08:49

## 2025-06-26 RX ADMIN — PANTOPRAZOLE SODIUM 40 MG: 40 TABLET, DELAYED RELEASE ORAL at 05:55

## 2025-06-26 RX ADMIN — BACLOFEN 5 MG: 10 TABLET ORAL at 17:19

## 2025-06-26 RX ADMIN — IPRATROPIUM BROMIDE AND ALBUTEROL SULFATE 3 ML: 2.5; .5 SOLUTION RESPIRATORY (INHALATION) at 12:47

## 2025-06-26 RX ADMIN — Medication 10 ML: at 01:05

## 2025-06-26 RX ADMIN — CEFTRIAXONE 1000 MG: 2 INJECTION, POWDER, FOR SOLUTION INTRAMUSCULAR; INTRAVENOUS at 17:19

## 2025-06-26 RX ADMIN — DOXYCYCLINE 100 MG: 100 CAPSULE ORAL at 20:30

## 2025-06-26 RX ADMIN — IPRATROPIUM BROMIDE AND ALBUTEROL SULFATE 3 ML: 2.5; .5 SOLUTION RESPIRATORY (INHALATION) at 19:28

## 2025-06-26 RX ADMIN — BUDESONIDE AND FORMOTEROL FUMARATE DIHYDRATE 2 PUFF: 160; 4.5 AEROSOL RESPIRATORY (INHALATION) at 19:28

## 2025-06-26 RX ADMIN — ATORVASTATIN CALCIUM 40 MG: 40 TABLET, FILM COATED ORAL at 20:22

## 2025-06-26 RX ADMIN — NICOTINE 1 PATCH: 21 PATCH, EXTENDED RELEASE TRANSDERMAL at 08:49

## 2025-06-26 NOTE — PROGRESS NOTES
[facility] Medicine Services  PROGRESS NOTE    Patient Name: Oscar Avial  : 1960  MRN: [id number]    Date of Admission: 2025  Primary Care Physician: Concepción Quigley DO    Subjective   Subjective     CC:  Pneumonia    HPI:  Patient currently on high flow nasal cannula oxygen, appears to be doing very well, does intermittently take off high flow for food and desats to 88% however current smoker suspect underlying COPD with COPD exacerbation in addition to pneumonia.  Denies any URI symptoms prior/prodromal to presentation to the ED.      Objective   Objective     Vital Signs:   Temp:  [98.1 °F (36.7 °C)-102.3 °F (39.1 °C)] 98.1 °F (36.7 °C)  Heart Rate:  [] 97  Resp:  [20-28] 20  BP: (102-151)/(57-86) 136/74  Flow (L/min) (Oxygen Therapy):  [2-50] 50     Physical Exam:  Constitutional: No acute distress, awake, alert  HENT: NCAT, mucous membranes moist  Respiratory: Clear to auscultation bilaterally, respiratory effort normal   Cardiovascular: RRR, no murmurs, rubs, or gallops  Gastrointestinal: Positive bowel sounds, soft, nontender, nondistended  Musculoskeletal: No bilateral ankle edema  Psychiatric: Appropriate affect, cooperative  Neurologic: Oriented x 3, strength symmetric in all extremities, Cranial Nerves grossly intact to confrontation, speech clear  Skin: No rashes     Results Reviewed:  LAB RESULTS:      Lab 25  0026 25  1924 25  1649 25  1539   WBC 14.61*  --   --  17.53*   HEMOGLOBIN 13.1  --   --  13.2   HEMATOCRIT 40.6  --   --  39.4   PLATELETS 263  --   --  280   NEUTROS ABS 12.90*  --   --  15.49*   IMMATURE GRANS (ABS) 0.07*  --   --  0.09*   LYMPHS ABS 0.61*  --   --  0.50*   MONOS ABS 1.00*  --   --  1.43*   EOS ABS 0.00  --   --  0.00   .8*  --   --  98.0*   CRP  --   --   --  34.44*   PROCALCITONIN  --   --   --  0.43*   LACTATE  --  0.9  --  2.1*   HSTROP T  --   --  15 14         Lab 25  0846  06/26/25  0026 06/25/25  1539   SODIUM 127* 131* 128*   POTASSIUM  --  4.5 5.0   CHLORIDE  --  99 94*   CO2  --  16.1* 20.0*   ANION GAP  --  15.9* 14.0   BUN  --  18.9 23.6*   CREATININE  --  1.07 1.17   EGFR  --  77.0 69.2   GLUCOSE  --  112* 97   CALCIUM  --  8.3* 8.6   MAGNESIUM  --  2.0  --          Lab 06/25/25  1539   TOTAL PROTEIN 7.1   ALBUMIN 3.6   GLOBULIN 3.5   ALT (SGPT) 9   AST (SGOT) 21   BILIRUBIN 0.4   ALK PHOS 94   LIPASE 22         Lab 06/25/25  1649 06/25/25  1539   PROBNP  --  483.8   HSTROP T 15 14                 Lab 06/25/25  2306 06/25/25  1636   PH, ARTERIAL 7.416 7.507*   PCO2, ARTERIAL 31.7* 24.6*   PO2 ART 58.3* 66.4*   FIO2 32 28   HCO3 ART 20.4 19.5*   BASE EXCESS ART -3.4* -2.1*   CARBOXYHEMOGLOBIN 1.1 1.8     Brief Urine Lab Results  (Last result in the past 365 days)        Color   Clarity   Blood   Leuk Est   Nitrite   Protein   CREAT   Urine HCG        06/25/25 2230 Dark Yellow   Clear   Negative   Small (1+)   Negative   30 mg/dL (1+)                   Microbiology Results Abnormal       None            CT Abdomen Pelvis Without Contrast  Result Date: 6/26/2025  CT ABDOMEN PELVIS WO CONTRAST Date of Exam: 6/25/2025 11:07 PM EDT Indication: left sided abdominal pain. Comparison: Chest CT 6/25/2025 Technique: Axial CT images were obtained of the abdomen and pelvis without the administration of contrast. Reconstructed coronal and sagittal images were also obtained. Automated exposure control and iterative construction methods were used. Findings: Emphysema noted in the lung bases. There is densely consolidating right lower lobe pneumonia with air bronchograms. Opacities in the dependent left lower lobe likely reflect some atelectasis. There is coronary artery disease and atherosclerotic disease. No abdominal aortic aneurysm is identified. Gallbladder is distended but otherwise normal. No biliary obstruction is seen. There is excreted contrast in the kidneys, ureters, and bladder  from the prior chest CT. There is nonspecific perirenal fat stranding this is probably some bland edema although pyelonephritis should be excluded clinically. The kidneys are nonobstructed and otherwise grossly normal in appearance. There is some atrophy of the pancreas. Solid abdominal organs are otherwise normal. Urinary bladder and prostate gland appear normal. The appendix is normal. There is colonic diverticulosis. No focal diverticulitis is identified, and there is no definite evidence of acute colitis. There are some prominent small bowel loops in the lower abdomen, and there are multiple gas-filled small bowel loops as well suggesting a generalized ileus rather than a bowel obstruction. Stomach is normal except for a small hiatal hernia. No adenopathy is seen. There is a retroaortic left renal vein. No free fluid. There are chronic compression fractures at L5 and L1.     Impression: 1.Densely consolidating right lower lobe pneumonia. 2.Nonspecific perirenal fat stranding. This is probably bland edema although pyelonephritis should be excluded clinically. No hydronephrosis. 3.Colonic diverticulosis without evidence of diverticulitis. 4.There are some prominent small bowel loops in the lower abdomen, and there are multiple gas-filled small bowel loops as well suggesting a generalized ileus rather than a bowel obstruction. 5.Small hiatal hernia. 6.Additional findings as given above. Electronically Signed: Derrell Denton MD  6/26/2025 12:33 AM EDT  Workstation ID: [id number]    CT Angiogram Chest Pulmonary Embolism  Result Date: 6/25/2025  CT ANGIOGRAM CHEST PULMONARY EMBOLISM Date of Exam: 6/25/2025 6:29 PM EDT Indication: Pulmonary Embolism. Comparison: 5/23/2024 Technique: Axial CT images were obtained of the chest after the uneventful intravenous administration of 80 cc Isovue-370 IV contrast utilizing pulmonary embolism protocol.  In addition, a 3-D volume rendered image was created for interpretation.   Reconstructed coronal and sagittal images were also obtained. Automated exposure control and iterative construction methods were used. Findings: The thyroid gland is normal. Emphysema. The airway is clear. Atheromatous disease of the coronary vessels. No pulmonary embolus. Right lower lobe posterior consolidation with bronchial wall thickening concerning for bronchitis. Dependent atelectatic changes noted on the left. Mild chronic anterior wedging of L1. Mild chronic anterior wedging of T7-T9. Exaggerated kyphosis of the thoracic spine. The sternum is intact. No acute rib fractures.     Impression: No pulmonary embolus. Right lower lobe consolidation with bronchial wall thickening concerning for bronchitis. Electronically Signed: Javier Mohan MD  6/25/2025 7:13 PM EDT  Workstation ID: [id number]    XR Chest 1 View  Result Date: 6/25/2025  XR CHEST 1 VW Date of Exam: 6/25/2025 3:44 PM EDT Indication: SOA triage protocol Comparison: Chest CT 5/23/2024, chest radiograph 11/23/2020. Findings: Cardiomediastinal silhouette is unchanged. Emphysematous changes of the lungs. Mild interstitial changes in the right and left lung bases. No pleural effusion or pneumothorax. Osseous structures are unchanged.     Impression: Impression: Mild interstitial changes in the right greater than left lung bases, which may reflect an infectious/inflammatory process. Background of emphysema. Electronically Signed: Iron Calderón MD  6/25/2025 4:07 PM EDT  Workstation ID: [id number]    CT Head Without Contrast  Result Date: 6/24/2025  CT HEAD WO CONTRAST, CT CERVICAL SPINE WO CONTRAST, CT THORACIC SPINE WO CONTRAST Date of Exam: 6/24/2025 10:06 PM EDT Indication: fall, trauma. Comparison: Head CT 11/23/2020. Chest CT 5/23/2024. Technique: Axial CT images were obtained of the head, cervical spine, and thoracic spine without contrast administration.  Automated exposure control and iterative construction methods were used. Findings: Head CT: No  acute intracranial hemorrhage.Intact appearing gray-white differentiation.No extra-axial fluid collection.No significant mass effect. No hydrocephalus. Mild generalized parenchymal volume loss. Scattered areas of periventricular and subcortical white matter hypoattenuation, nonspecific, perhaps from small vessel ischemic/hypertensive changes in a patient of this age.There are intracranial atherosclerotic calcifications. Mild scattered mucosal thickening in the paranasal sinuses.Mastoid air cells are essentially clear.Included globes and orbits appear unremarkable by CT. Left frontal scalp edema/contusion. No acute calvarial fracture seen. Cervical spine CT: There are 7 cervical type vertebral bodies. No acute fracture or traumatic malalignment. Straightening of the normal cervical lordosis.. Grade 1 anterolisthesis of C5 on C6. Multilevel degenerative endplate changes. Vertebral body heights otherwise appear maintained. There are multilevel degenerative changes of the cervical spine including disc osteophytes, uncovertebral hypertrophy, and facet arthropathy. Mild multifocal spinal canal stenosis. Multilevel moderate to severe neural foraminal narrowing. There is no significant prevertebral soft tissue edema.. Vascular calcifications. Scattered atelectasis and/or scarring in the imaged lungs. Thoracic spine CT: There are 12 rib-bearing vertebral bodies. No acute fracture or traumatic malalignment.. There is a chronic appearing compression fracture at L1. There is also multilevel chronic appearing anterior wedging in the mid thoracic vertebrae. There is exaggerated thoracic kyphosis. Multilevel degenerative endplate changes. Vertebral body heights are otherwise maintained. Multilevel degenerative changes including disc height loss, osteophytes, and facet arthropathy. Mild multilevel spinal canal stenosis. Mild to moderate multilevel foraminal narrowing. Consolidative opacities in the right lower lobe. Vascular  calcifications.     Impression: Impression: HEAD CT: No acute intracranial findings. CERVICAL SPINE CT: No acute fracture or traumatic malalignment. THORACIC SPINE CT: No acute fracture or traumatic malalignment. Chronic appearing compression fracture at L1 and multilevel chronic anterior wedging in the midthoracic vertebrae. Consolidative opacities in the right lower lobe, suspicious for aspiration or pneumonia. Electronically Signed: Richy Monsivais MD  6/24/2025 10:38 PM EDT  Workstation ID: [id number]    CT Cervical Spine Without Contrast  Result Date: 6/24/2025  CT HEAD WO CONTRAST, CT CERVICAL SPINE WO CONTRAST, CT THORACIC SPINE WO CONTRAST Date of Exam: 6/24/2025 10:06 PM EDT Indication: fall, trauma. Comparison: Head CT 11/23/2020. Chest CT 5/23/2024. Technique: Axial CT images were obtained of the head, cervical spine, and thoracic spine without contrast administration.  Automated exposure control and iterative construction methods were used. Findings: Head CT: No acute intracranial hemorrhage.Intact appearing gray-white differentiation.No extra-axial fluid collection.No significant mass effect. No hydrocephalus. Mild generalized parenchymal volume loss. Scattered areas of periventricular and subcortical white matter hypoattenuation, nonspecific, perhaps from small vessel ischemic/hypertensive changes in a patient of this age.There are intracranial atherosclerotic calcifications. Mild scattered mucosal thickening in the paranasal sinuses.Mastoid air cells are essentially clear.Included globes and orbits appear unremarkable by CT. Left frontal scalp edema/contusion. No acute calvarial fracture seen. Cervical spine CT: There are 7 cervical type vertebral bodies. No acute fracture or traumatic malalignment. Straightening of the normal cervical lordosis.. Grade 1 anterolisthesis of C5 on C6. Multilevel degenerative endplate changes. Vertebral body heights otherwise appear maintained. There are multilevel  degenerative changes of the cervical spine including disc osteophytes, uncovertebral hypertrophy, and facet arthropathy. Mild multifocal spinal canal stenosis. Multilevel moderate to severe neural foraminal narrowing. There is no significant prevertebral soft tissue edema.. Vascular calcifications. Scattered atelectasis and/or scarring in the imaged lungs. Thoracic spine CT: There are 12 rib-bearing vertebral bodies. No acute fracture or traumatic malalignment.. There is a chronic appearing compression fracture at L1. There is also multilevel chronic appearing anterior wedging in the mid thoracic vertebrae. There is exaggerated thoracic kyphosis. Multilevel degenerative endplate changes. Vertebral body heights are otherwise maintained. Multilevel degenerative changes including disc height loss, osteophytes, and facet arthropathy. Mild multilevel spinal canal stenosis. Mild to moderate multilevel foraminal narrowing. Consolidative opacities in the right lower lobe. Vascular calcifications.     Impression: Impression: HEAD CT: No acute intracranial findings. CERVICAL SPINE CT: No acute fracture or traumatic malalignment. THORACIC SPINE CT: No acute fracture or traumatic malalignment. Chronic appearing compression fracture at L1 and multilevel chronic anterior wedging in the midthoracic vertebrae. Consolidative opacities in the right lower lobe, suspicious for aspiration or pneumonia. Electronically Signed: Richy Monsivais MD  6/24/2025 10:38 PM EDT  Workstation ID: [id number]    CT Thoracic Spine Without Contrast  Result Date: 6/24/2025  CT HEAD WO CONTRAST, CT CERVICAL SPINE WO CONTRAST, CT THORACIC SPINE WO CONTRAST Date of Exam: 6/24/2025 10:06 PM EDT Indication: fall, trauma. Comparison: Head CT 11/23/2020. Chest CT 5/23/2024. Technique: Axial CT images were obtained of the head, cervical spine, and thoracic spine without contrast administration.  Automated exposure control and iterative construction methods were  used. Findings: Head CT: No acute intracranial hemorrhage.Intact appearing gray-white differentiation.No extra-axial fluid collection.No significant mass effect. No hydrocephalus. Mild generalized parenchymal volume loss. Scattered areas of periventricular and subcortical white matter hypoattenuation, nonspecific, perhaps from small vessel ischemic/hypertensive changes in a patient of this age.There are intracranial atherosclerotic calcifications. Mild scattered mucosal thickening in the paranasal sinuses.Mastoid air cells are essentially clear.Included globes and orbits appear unremarkable by CT. Left frontal scalp edema/contusion. No acute calvarial fracture seen. Cervical spine CT: There are 7 cervical type vertebral bodies. No acute fracture or traumatic malalignment. Straightening of the normal cervical lordosis.. Grade 1 anterolisthesis of C5 on C6. Multilevel degenerative endplate changes. Vertebral body heights otherwise appear maintained. There are multilevel degenerative changes of the cervical spine including disc osteophytes, uncovertebral hypertrophy, and facet arthropathy. Mild multifocal spinal canal stenosis. Multilevel moderate to severe neural foraminal narrowing. There is no significant prevertebral soft tissue edema.. Vascular calcifications. Scattered atelectasis and/or scarring in the imaged lungs. Thoracic spine CT: There are 12 rib-bearing vertebral bodies. No acute fracture or traumatic malalignment.. There is a chronic appearing compression fracture at L1. There is also multilevel chronic appearing anterior wedging in the mid thoracic vertebrae. There is exaggerated thoracic kyphosis. Multilevel degenerative endplate changes. Vertebral body heights are otherwise maintained. Multilevel degenerative changes including disc height loss, osteophytes, and facet arthropathy. Mild multilevel spinal canal stenosis. Mild to moderate multilevel foraminal narrowing. Consolidative opacities in the right  lower lobe. Vascular calcifications.     Impression: Impression: HEAD CT: No acute intracranial findings. CERVICAL SPINE CT: No acute fracture or traumatic malalignment. THORACIC SPINE CT: No acute fracture or traumatic malalignment. Chronic appearing compression fracture at L1 and multilevel chronic anterior wedging in the midthoracic vertebrae. Consolidative opacities in the right lower lobe, suspicious for aspiration or pneumonia. Electronically Signed: Richy Monsivais MD  6/24/2025 10:38 PM EDT  Workstation ID: [id number]      Results for orders placed during the hospital encounter of 04/25/23    Adult Transthoracic Echo Complete W/ Cont if Necessary Per Protocol    Interpretation Summary    Left ventricular ejection fraction appears to be 56 - 60%.    The left ventricular cavity is small in size. Left ventricular diastolic function was normal.    No significant valvular stenosis or regurgitation.      Current medications:  Scheduled Meds:atorvastatin, 40 mg, Oral, Nightly  budesonide-formoterol, 2 puff, Inhalation, BID - RT  cefTRIAXone, 1,000 mg, Intravenous, Q24H  doxycycline, 100 mg, Intravenous, Q12H  DULoxetine, 60 mg, Oral, Daily  fluticasone, 2 spray, Each Nare, Daily  ipratropium-albuterol, 3 mL, Nebulization, 4x Daily - RT  lisinopril, 10 mg, Oral, Daily  metoprolol succinate XL, 25 mg, Oral, Daily  nicotine, 1 patch, Transdermal, Q24H  pantoprazole, 40 mg, Oral, Q AM  prazosin, 2 mg, Oral, Nightly  sodium chloride, 10 mL, Intravenous, Q12H      Continuous Infusions:sodium chloride, 75 mL/hr, Last Rate: 75 mL/hr (06/26/25 0107)      PRN Meds:.  acetaminophen    HYDROmorphone    ipratropium-albuterol    ketorolac    sodium chloride    [COMPLETED] Insert Peripheral IV **AND** sodium chloride    sodium chloride    sodium chloride    Assessment & Plan   Assessment & Plan     Active Hospital Problems    Diagnosis  POA    **Acute hypoxic respiratory failure [J96.01]  Yes    Ileus [K56.7]  Yes    Sepsis  [A41.9]  Yes    Pneumonia [J18.9]  Yes    Hyponatremia [E87.1]  Yes    Abdominal pain [R10.9]  Yes    Bronchitis [J40]  Yes    COPD (chronic obstructive pulmonary disease) [J44.9]  Yes    Tobacco abuse [Z72.0]  Yes    GERD without esophagitis [K21.9]  Yes    Primary hypertension [I10]  Yes    Hyperlipidemia [E78.5]  Yes      Resolved Hospital Problems   No resolved problems to display.        Brief Hospital Course to date:  Oscar Avila is a 64 y.o. male w/ a hx of COPD, ongoing tobacco use, HTN, HLD who presented to the ED w/ c/o shortness of breath and coughing.      Acute hypoxic respiratory failure   Sepsis 2/2 Pneumonia   Bronchitis/Hx of COPD   Tobacco use  Leukocytosis  COPD exacerbation  meets sepsis criteria based on HR 112bpm, temp 102.3, 87% on RA, WBC 17.53, CRP 34.44. procal 0.43, lactic acid 2.1 and + source (PNA per CTA Chest)   noted to be 87% on room air, currently requiring high flow nasal cannula oxygen  Symptomatic from RLL pneumonia and COPD exacerbation evident by increasing oxygen requirements, increased phlegm production and shortness of breath  ABG c/w hypoxia (pH 7.5, CO2 24, O2 66.4)  CTA Chest: Densely consolidating RLL pneumonia, bronchitis, and nonspecific perirenal fat stranding negative for PE  bedside dysphagia screen   blood cx pending   sputum cx pending   respiratory panel negative   Doxycycline and Rocephin started in ED; continue   s/p NS 2,430ml given in ED  continue NS @ 75ml/hour x 12 hours  prn and scheduled nebs   continue Flonase, Advair/Wixela (sub Symbicort)  smoking cessation education   nicotine patch   pt/ot and case mgt consult  Check echo TTE     Hyponatremia   sodium 127 (139 yesterday at 11am), possibly SIADH in the setting of pneumonia versus hypovolemic hyponatremia  Recheck sodium tomorrow a.m. with BMP  Serum osmolarity 279, urine osmolarity 731, urine sodium 24  s/p 2,430ml NS bolus given in ED  continue NS @ 75ml/hour x 12 hours (adjust as needed),  hold off on additional fluids  Check echo TTE, no evidence of cirrhosis on CT abdomen with liver enzymes WNL     Abdominal pain  Hiccups  likely musculoskeletal in nature; abdominal exam benign, pt c/o pain w/ movement, coughing and w/ hiccups   pt also reported falling on Friday and again Tuesday night- evaluated in ED Tuesday afternoon   CT abdomen pelvis showed densely consolidated RLL pneumonia, nonspecific perirenal fat stranding with probable bland edema, colonic diverticulosis, prominent small bowel loops in lower abdomen and multiple gas-filled small bowel loops suggesting a generalized ileus rather than bowel obstruction and a small hiatal hernia  Patient on steroids for COPD exacerbation, have significantly helped, will add on baclofen as well  Will consult SLP to evaluate for possible dysphagia or pharyngeal/laryngeal trigger of hiccups, has endorsed this has been ongoing for a while     HTN  HLD  EKG stable  proBNP and troponin both WNL   continue routine statin   continue Toprol, lisinopril, prazosin w/ hold parameters      GERD  continue PPI    Expected Discharge Location and Transportation: TBD  Expected Discharge TBD  Expected Discharge Date: 6/28/2025; Expected Discharge Time:      VTE Prophylaxis:  Mechanical VTE prophylaxis orders are present.     Total time spent: Time Spent: Time Spent: 50 minutes  Time spent includes time reviewing chart, face-to-face time, counseling patient/family/caregiver, ordering medications/tests/procedures, communicating with other health care professionals, documenting clinical information in the electronic health record, and coordination of care.      AM-PAC 6 Clicks Score (PT): 22 (06/26/25 0029)    CODE STATUS:   Code Status and Medical Interventions: CPR (Attempt to Resuscitate); Full Support   Ordered at: 06/25/25 2015     Code Status (Patient has no pulse and is not breathing):    CPR (Attempt to Resuscitate)     Medical Interventions (Patient has pulse or is  breathing):    Full Support       Shantell Martell MD  06/26/25

## 2025-06-26 NOTE — PLAN OF CARE
Problem: Adult Inpatient Plan of Care  Goal: Absence of Hospital-Acquired Illness or Injury  Intervention: Prevent Infection  Recent Flowsheet Documentation  Taken 6/26/2025 0443 by Ricardo Ferreira RN  Infection Prevention:   single patient room provided   hand hygiene promoted  Taken 6/26/2025 0200 by Ricardo Ferreira RN  Infection Prevention:   single patient room provided   hand hygiene promoted  Taken 6/26/2025 0029 by Ricardo Ferreira RN  Infection Prevention:   single patient room provided   rest/sleep promoted   hand hygiene promoted     Problem: Adult Inpatient Plan of Care  Goal: Absence of Hospital-Acquired Illness or Injury  Intervention: Prevent and Manage VTE (Venous Thromboembolism) Risk  Recent Flowsheet Documentation  Taken 6/26/2025 0029 by Ricardo Ferreira RN  VTE Prevention/Management: SCDs (sequential compression devices) on     Problem: Adult Inpatient Plan of Care  Goal: Absence of Hospital-Acquired Illness or Injury  Intervention: Prevent Skin Injury  Recent Flowsheet Documentation  Taken 6/26/2025 0443 by Ricardo Ferreira RN  Body Position: sitting up in bed  Skin Protection:   transparent dressing maintained   incontinence pads utilized  Taken 6/26/2025 0200 by Ricardo Ferreira RN  Body Position: side-lying  Skin Protection:   transparent dressing maintained   incontinence pads utilized  Taken 6/26/2025 0029 by Ricardo Ferreira RN  Body Position: sitting up in bed  Skin Protection:   transparent dressing maintained   incontinence pads utilized   pulse oximeter probe site changed     Problem: Adult Inpatient Plan of Care  Goal: Absence of Hospital-Acquired Illness or Injury  Intervention: Identify and Manage Fall Risk  Recent Flowsheet Documentation  Taken 6/26/2025 0443 by Riacrdo Ferreira RN  Safety Promotion/Fall Prevention:   safety round/check completed   room organization consistent   nonskid shoes/slippers when out of bed   lighting adjusted   fall prevention program  maintained   clutter free environment maintained   assistive device/personal items within reach  Taken 6/26/2025 0200 by Ricardo Ferreira RN  Safety Promotion/Fall Prevention:   safety round/check completed   room organization consistent   nonskid shoes/slippers when out of bed   lighting adjusted   fall prevention program maintained   clutter free environment maintained   assistive device/personal items within reach  Taken 6/26/2025 0029 by Ricardo Ferreira RN  Safety Promotion/Fall Prevention:   safety round/check completed   room organization consistent   nonskid shoes/slippers when out of bed   muscle strengthening facilitated   mobility aid in reach   lighting adjusted   gait belt   fall prevention program maintained   clutter free environment maintained   assistive device/personal items within reach   Goal Outcome Evaluation:

## 2025-06-26 NOTE — CASE MANAGEMENT/SOCIAL WORK
Discharge Planning Assessment  [facility]     Patient Name: Oscar Avila  MRN: [id number]  Today's Date: 6/26/2025    Admit Date: 6/25/2025    Plan: Home   Discharge Needs Assessment       Row Name 06/26/25 1441       Living Environment    People in Home spouse    Name(s) of People in Home Maribell Avila    Current Living Arrangements home    Potentially Unsafe Housing Conditions none    Primary Care Provided by self    Provides Primary Care For no one    Family Caregiver if Needed spouse    Family Caregiver Names Maribell Avila    Quality of Family Relationships helpful;involved;supportive    Able to Return to Prior Arrangements yes       Resource/Environmental Concerns    Resource/Environmental Concerns home accessibility    Home Accessibility Concerns stairs to access bedroom or bathroom    Transportation Concerns none       Transition Planning    Patient/Family Anticipates Transition to home    Patient/Family Anticipated Services at Transition none    Transportation Anticipated family or friend will provide       Discharge Needs Assessment    Readmission Within the Last 30 Days no previous admission in last 30 days    Equipment Currently Used at Home bp cuff    Concerns to be Addressed denies needs/concerns at this time;discharge planning    Anticipated Changes Related to Illness none    Equipment Needed After Discharge none    Discharge Coordination/Progress Home                   Discharge Plan       Row Name 06/26/25 1442       Plan    Plan Home    Patient/Family in Agreement with Plan yes    Plan Comments Per MDR, patient now on HFNC.  Spoke with patient at bedside regarding discharge planning.  Patient denies use of HH and reports only having a Blood Pressure machine at home.  Patient idnicates having prescription coverage with medications being affordable.  Patient lives with spouse in a split level house with aporoximately 6-8 stairs to reach main level where bedroom is and ground level entry.   Patient reports being independent at home until this.  No immediate needs noted.  CM following.  Patient discharge plan ongoing.    Final Discharge Disposition Code 01 - home or self-care                    Expected Discharge Date and Time       Expected Discharge Date Expected Discharge Time    Jun 28, 2025            Demographic Summary       Row Name 06/26/25 1439       General Information    Admission Type inpatient    Arrived From home    Referral Source admission list    Reason for Consult discharge planning    Preferred Language English    General Information Comments Concepción Quigley,        Contact Information    Permission Granted to Share Info With     Contact Information Comments Maribell Avila, spouse  [phone] [phone]  Constance Guerra, daughter   [phone]                   Functional Status       Row Name 06/26/25 1441       Functional Status    Usual Activity Tolerance good    Current Activity Tolerance good       Functional Status, IADL    Medications independent    Meal Preparation independent    Housekeeping independent    Laundry independent    Shopping independent       Employment/    Employment/ Comments [employer] Medicare Replacement                   Psychosocial    No documentation.                  Abuse/Neglect    No documentation.                  Legal    No documentation.                  Substance Abuse    No documentation.                  Patient Forms    No documentation.                     Roxie Arnold RN

## 2025-06-26 NOTE — THERAPY EVALUATION
Patient Name: Oscar Avila  : 1960    MRN: [id number]                              Today's Date: 2025       Admit Date: 2025    Visit Dx:     ICD-10-CM ICD-9-CM   1. SOB (shortness of breath)  R06.02 786.05   2. Hypoxia  R09.02 799.02   3. Pneumonia of both lower lobes due to infectious organism  J18.9 486   4. Leukocytosis, unspecified type  D72.829 288.60   5. Hyponatremia  E87.1 276.1     Patient Active Problem List   Diagnosis    COPD (chronic obstructive pulmonary disease)    Hyperlipidemia    Coronary artery calcification    Emphysema, unspecified    Current smoker    Alcoholism in recovery    Primary hypertension    Polyp of colon    ANGELA (obstructive sleep apnea)    Acute hypoxic respiratory failure    Sepsis    Pneumonia    Hyponatremia    Abdominal pain    Bronchitis    COPD (chronic obstructive pulmonary disease)    Tobacco abuse    GERD without esophagitis    Ileus     Past Medical History:   Diagnosis Date    Alcohol use     Hyperlipidemia     Hypertension     Neuromuscular disorder      Past Surgical History:   Procedure Laterality Date    CARPAL TUNNEL RELEASE Left     CARPAL TUNNEL RELEASE Right 2023    Dr. Esa Acosta    FOOT FRACTURE SURGERY Right 2018    fracture to right foot - has a metal pin on great toe    WISDOM TOOTH EXTRACTION        General Information       Row Name 25 1026          OT Time and Intention    Document Type evaluation  -GOMEZ     Mode of Treatment occupational therapy  -GOMEZ       Row Name 25 1026          General Information    Patient Profile Reviewed yes  -GOMEZ     Prior Level of Function independent:;ADL's;bed mobility;transfer;all household mobility;community mobility;driving;using stairs  Ambulates without AD at baseline, on room air at baseline  -GOMEZ     Existing Precautions/Restrictions fall;oxygen therapy device and L/min;other (see comments)  HFNC; painful hiccups  -GOMEZ     Barriers to Rehab medically complex;previous  functional deficit  -       Row Name 06/26/25 1026          Occupational Profile    Environmental Supports and Barriers (Occupational Profile) Lives with spouse in split-level home; IND ADL's/IADL's; tub shower with shower chair; no supplemental O2  -       Row Name 06/26/25 1026          Living Environment    Current Living Arrangements home  -     People in Home spouse  -       Row Name 06/26/25 1026          Home Main Entrance    Number of Stairs, Main Entrance other (see comments);none  Pt accesses home through garage  -       Row Name 06/26/25 1026          Stairs Within Home, Primary    Stairs, Within Home, Primary 7 + 7 (split level)  -     Number of Stairs, Within Home, Primary other (see comments)  14 (7 up/7 down)  -     Stair Railings, Within Home, Primary railings safe and in good condition  -       Row Name 06/26/25 1026          Cognition    Orientation Status (Cognition) oriented x 3  -       Row Name 06/26/25 1026          Safety Issues/Impairments Affecting Functional Mobility    Safety Issues Affecting Function (Mobility) awareness of need for assistance;insight into deficits/self-awareness;judgment;problem-solving;safety precaution awareness;safety precautions follow-through/compliance;sequencing abilities  -     Impairments Affecting Function (Mobility) balance;coordination;endurance/activity tolerance;motor planning;pain;postural/trunk control;shortness of breath;strength  -               User Key  (r) = Recorded By, (t) = Taken By, (c) = Cosigned By      Initials Name Provider Type    GOMEZ Crystal Cornejo OT Occupational Therapist                     Mobility/ADL's       Row Name 06/26/25 1030          Bed Mobility    Bed Mobility supine-sit  -     Supine-Sit [place] (Bed Mobility) moderate assist (50% patient effort);1 person assist;verbal cues;nonverbal cues (demo/gesture)  -     Bed Mobility, Safety Issues impaired trunk control for bed mobility  -     Assistive  Device (Bed Mobility) bed rails;head of bed elevated  -     Comment, (Bed Mobility) assist for trunk control; cues for sequencing  -GOMEZ       Row Name 06/26/25 1030          Transfers    Transfers sit-stand transfer;bed-chair transfer  -     Comment, (Transfers) increased time needed to stabilize in standing  -GOMEZ       Row Name 06/26/25 1030          Bed-Chair Transfer    Bed-Chair [place] (Transfers) minimum assist (75% patient effort);1 person assist;verbal cues;nonverbal cues (demo/gesture)  -GOMEZ     Comment, (Bed-Chair Transfer) UE support  -GOMEZ       Row Name 06/26/25 1030          Sit-Stand Transfer    Sit-Stand [place] (Transfers) moderate assist (50% patient effort);1 person assist;verbal cues  -     Comment, (Sit-Stand Transfer) boost to stand from EOB  -GOMEZ       Row Name 06/26/25 1030          Functional Mobility    Functional Mobility- Ind. Level not tested  -GOMEZ       Row Name 06/26/25 1030          Activities of Daily Living    BADL Assessment/Intervention lower body dressing;grooming  -GOMEZ       Row Name 06/26/25 1030          Hygiene Care    Oral Care patient refused intervention  -GOMEZ       Row Name 06/26/25 1030          Lower Body Dressing Assessment/Training    [place] Level (Lower Body Dressing) doff;don;socks;dependent (less than 25% patient effort)  -GOMEZ     Position (Lower Body Dressing) sitting up in bed  -GOMEZ     Comment, (Lower Body Dressing) Limited by weakness  -GOMEZ       Row Name 06/26/25 1030          Grooming Assessment/Training    [place] Level (Grooming) wash face, hands;set up  -GOMEZ     Position (Grooming) sitting up in bed  -GOMEZ     Comment, (Grooming) oral care supplies provided  -               User Key  (r) = Recorded By, (t) = Taken By, (c) = Cosigned By      Initials Name Provider Type    Crystal Thornton OT Occupational Therapist                   Obj/Interventions       Row Name 06/26/25 1032          Sensory Assessment (Somatosensory)    Sensory Assessment  (Somatosensory) UE sensation intact  -       Row Name 06/26/25 1032          Vision Assessment/Intervention    Visual Impairment/Limitations WFL;corrective lenses full-time  -       Row Name 06/26/25 1032          Range of Motion Comprehensive    General Range of Motion bilateral upper extremity ROM WFL  -[facility] Name 06/26/25 1032          Strength Comprehensive (MMT)    Comment, General Manual Muscle Testing (MMT) Assessment BUE strength grossly 4-/5  -GOMEZ       Row Name 06/26/25 1032          Balance    Balance Assessment sitting static balance;sitting dynamic balance;standing static balance;standing dynamic balance  -GOMEZ     Static Sitting Balance contact guard  -GOMEZ     Dynamic Sitting Balance minimal assist  -GOMEZ     Position, Sitting Balance unsupported;sitting edge of bed  -GOMEZ     Static Standing Balance minimal assist  -GOMEZ     Dynamic Standing Balance moderate assist  -GOMEZ     Position/Device Used, Standing Balance supported  -GOMEZ     Balance Interventions occupation based/functional task;weight shifting activity;sit to stand  -GOMEZ     Comment, Balance ModA for STS from EOB  -GOMEZ               User Key  (r) = Recorded By, (t) = Taken By, (c) = Cosigned By      Initials Name Provider Type    Crystal Thornton OT Occupational Therapist                   Goals/Plan       [facility] Name 06/26/25 1108          Transfer Goal 1 (OT)    Activity/Assistive Device (Transfer Goal 1, OT) sit-to-stand/stand-to-sit;bed-to-chair/chair-to-bed;toilet  -GOMEZ     [place] Level/Cues Needed (Transfer Goal 1, OT) contact guard required  -GOMEZ     Time Frame (Transfer Goal 1, OT) long term goal (LTG);10 days  -GOMEZ     Progress/Outcome (Transfer Goal 1, OT) new goal  -[facility] Name 06/26/25 1108          Toileting Goal 1 (OT)    Activity/Device (Toileting Goal 1, OT) adjust/manage clothing;perform perineal hygiene;commode;grab bar/safety frame  -GOMEZ     [place] Level/Cues Needed (Toileting Goal 1, OT) moderate assist (50-74%  patient effort)  -GOMEZ     Time Frame (Toileting Goal 1, OT) short term goal (STG);1 week  -GOMEZ     Progress/Outcome (Toileting Goal 1, OT) new goal  -GOMEZ       Row Name 06/26/25 1108          Grooming Goal 1 (OT)    Activity/Device (Grooming Goal 1, OT) hair care;oral care;wash face, hands  -GOMEZ     [place] (Grooming Goal 1, OT) contact guard required  -GOMEZ     Time Frame (Grooming Goal 1, OT) short term goal (STG);1 week  -GOMEZ     Strategies/Barriers (Grooming Goal 1, OT) standing sink side  -GOMEZ       Row Name 06/26/25 1108          Therapy Assessment/Plan (OT)    Planned Therapy Interventions (OT) activity tolerance training;BADL retraining;functional balance retraining;occupation/activity based interventions;patient/caregiver education/training;ROM/therapeutic exercise;strengthening exercise;transfer/mobility retraining  -GOMEZ               User Key  (r) = Recorded By, (t) = Taken By, (c) = Cosigned By      Initials Name Provider Type    GOMEZ Crystal Cornejo OT Occupational Therapist                   Clinical Impression       Row Name 06/26/25 1033          Pain Assessment    Pain Location chest  -GOMEZ     Pain Management Interventions exercise or physical activity utilized;positioning techniques utilized  -GOMEZ     Response to Pain Interventions activity participation with tolerable pain  -GOMEZ     Pre/Posttreatment Pain Comment RN notified  -GOMEZ     Additional Documentation Pain Scale: FACES Pre/Post-Treatment (Group)  -[facility] Name 06/26/25 1033          Pain Scale: FACES Pre/Post-Treatment    Pain: FACES Scale, Pretreatment 4-->hurts little more  -GOMEZ     Posttreatment Pain Rating 6-->hurts even more  -       Row Name 06/26/25 1033          Plan of Care Review    Plan of Care Reviewed With patient  -GOMEZ     Outcome Evaluation OT eval completed. Pt presents below baseline for ADL performance, limited by SOA, significant weakness, and balance deficits. Pt is not O2 dependent and ambulates without AD at baseline. This  date, pt Dep for donning socks, ModA for STS from EOB, IBARRA for grooming while seated. O2 desat. noted to 86% on hi-alicia while seated EOB. IP OT services warranted. Recommend IRF at discharge for optimal outcomes.  -GOMEZ       Row Name 06/26/25 1033          Therapy Assessment/Plan (OT)    Patient/Family Therapy Goal Statement (OT) To return to PLOF  -GOMEZ     Rehab Potential (OT) good  -GOMEZ     Criteria for Skilled Therapeutic Interventions Met (OT) yes;meets criteria;skilled treatment is necessary  -GOMEZ     Predicted Duration of Therapy Intervention (OT) 10 days  -GOMEZ       Row Name 06/26/25 1033          Therapy Plan Review/Discharge Plan (OT)    Anticipated Discharge Disposition (OT) inpatient rehabilitation facility  -       Row Name 06/26/25 1033          Vital Signs    Intra Systolic BP Rehab 105  seated EOB  -GOMEZ     Intra Treatment Diastolic BP 53  -GOMEZ     Pretreatment Heart Rate (beats/min) 67  -GOMEZ     Pre SpO2 (%) 92  -GOMEZ     O2 Delivery Pre Treatment hi-flow  -GOMEZ     Intra SpO2 (%) 86   -GOMEZ     O2 Delivery Intra Treatment hi-flow  -GOMEZ     Post SpO2 (%) 93  -GOMEZ     O2 Delivery Post Treatment hi-flow  -GOMEZ     Pre Patient Position Supine  -GOMEZ     Intra Patient Position Standing  -GOMEZ     Post Patient Position Sitting  -GOMEZ       Row Name 06/26/25 1033          Positioning and Restraints    Pre-Treatment Position in bed  -GOMEZ     Post Treatment Position chair  -GOMEZ     In Chair notified nsg;reclined;call light within reach;encouraged to call for assist;exit alarm on;waffle cushion;legs elevated;heels elevated  -GOMEZ               User Key  (r) = Recorded By, (t) = Taken By, (c) = Cosigned By      Initials Name Provider Type    Crystal Thornton, OT Occupational Therapist                   Outcome Measures       Row Name 06/26/25 1111          How much help from another is currently needed...    Putting on and taking off regular lower body clothing? 2  -GOMEZ     Bathing (including washing, rinsing, and drying) 2  -GOMEZ      Toileting (which includes using toilet bed pan or urinal) 1  -GOMEZ     Putting on and taking off regular upper body clothing 3  -GOMEZ     Taking care of personal grooming (such as brushing teeth) 3  -GOMEZ     Eating meals 3  -GOMEZ     AM-PAC 6 Clicks Score (OT) 14  -       Row Name 06/26/25 0029          How much help from another person do you currently need...    Turning from your back to your side while in flat bed without using bedrails? 4  -MH     Moving from lying on back to sitting on the side of a flat bed without bedrails? 4  -MH     Moving to and from a bed to a chair (including a wheelchair)? 4  -MH     Standing up from a chair using your arms (e.g., wheelchair, bedside chair)? 4  -MH     Climbing 3-5 steps with a railing? 3  -MH     To walk in hospital room? 3  -     AM-PAC 6 Clicks Score (PT) 22  -       Row Name 06/26/25 1111          Functional Assessment    Outcome Measure Options AM-PAC 6 Clicks Daily Activity (OT)  -               User Key  (r) = Recorded By, (t) = Taken By, (c) = Cosigned By      Initials Name Provider Type    Crystal Thornton OT Occupational Therapist    Ricardo Morejon, RN Registered Nurse                    Occupational Therapy Education       Title: PT OT SLP Therapies (In Progress)       Topic: Occupational Therapy (In Progress)       Point: ADL training (Done)       Learning Progress Summary            Patient Acceptance, E, VU by GOMEZ at 6/26/2025 1111    Comment: OT POC; optimal breathing techniques                                      User Key       Initials Effective Dates Name Provider Type Discipline    GOMEZ 06/16/21 -  Crystal Cornejo OT Occupational Therapist OT                  OT Recommendation and Plan  Planned Therapy Interventions (OT): activity tolerance training, BADL retraining, functional balance retraining, occupation/activity based interventions, patient/caregiver education/training, ROM/therapeutic exercise, strengthening exercise, transfer/mobility  retraining  Plan of Care Review  Plan of Care Reviewed With: patient  Outcome Evaluation: OT eval completed. Pt presents below baseline for ADL performance, limited by SOA, significant weakness, and balance deficits. Pt is not O2 dependent and ambulates without AD at baseline. This date, pt Dep for donning socks, ModA for STS from EOB, IBARRA for grooming while seated. O2 desat. noted to 86% on hi-alicia while seated EOB. IP OT services warranted. Recommend IRF at discharge for optimal outcomes.     Time Calculation:   Evaluation Complexity (OT)  Review Occupational Profile/Medical/Therapy History Complexity: expanded/moderate complexity  Assessment, Occupational Performance/Identification of Deficit Complexity: 3-5 performance deficits  Clinical Decision Making Complexity (OT): detailed assessment/moderate complexity  Overall Complexity of Evaluation (OT): moderate complexity     Time Calculation- OT       Row Name 06/26/25 0850             Time Calculation- OT    OT Start Time 0850  -GOMEZ      OT Received On 06/26/25  -GOMEZ      OT Goal Re-Cert Due Date 07/06/25  -GOMEZ         Untimed Charges    OT Eval/Re-eval Minutes 70  -GOMEZ         Total Minutes    Untimed Charges Total Minutes 70  -GOMEZ       Total Minutes 70  -GOMEZ                User Key  (r) = Recorded By, (t) = Taken By, (c) = Cosigned By      Initials Name Provider Type    Crystal Thornton OT Occupational Therapist                  Therapy Charges for Today       Code Description Service Date Service Provider Modifiers Qty    [id number] HC-OT EVAL MOD COMPLEXITY 5 6/26/2025 Crystal Cornejo OT  1                 Crystal Cornejo OT  6/26/2025

## 2025-06-26 NOTE — PLAN OF CARE
Goal Outcome Evaluation:  Plan of Care Reviewed With: patient           Outcome Evaluation: OT eval completed. Pt presents below baseline for ADL performance, limited by SOA, significant weakness, and balance deficits. Pt is not O2 dependent and ambulates without AD at baseline. This date, pt Dep for donning socks, ModA for STS from EOB, IBARRA for grooming while seated. O2 desat. noted to 86% on hi-alicia while seated EOB. IP OT services warranted. Recommend IRF at discharge for optimal outcomes.    Anticipated Discharge Disposition (OT): inpatient rehabilitation facility

## 2025-06-27 ENCOUNTER — ANCILLARY PROCEDURE (OUTPATIENT)
Dept: SPEECH THERAPY | Facility: HOSPITAL | Age: 65
End: 2025-06-27
Payer: MEDICARE

## 2025-06-27 LAB
BACTERIA SPEC AEROBE CULT: NO GROWTH
FOLATE SERPL-MCNC: 6.52 NG/ML (ref 4.78–24.2)
L PNEUMO1 AG UR QL IA: POSITIVE
S PNEUM AG SPEC QL LA: NEGATIVE
SODIUM SERPL-SCNC: 130 MMOL/L (ref 136–145)
SODIUM SERPL-SCNC: 132 MMOL/L (ref 136–145)
SODIUM SERPL-SCNC: 133 MMOL/L (ref 136–145)
VIT B12 BLD-MCNC: 568 PG/ML (ref 211–946)

## 2025-06-27 PROCEDURE — 84295 ASSAY OF SERUM SODIUM: CPT | Performed by: NURSE PRACTITIONER

## 2025-06-27 PROCEDURE — 94799 UNLISTED PULMONARY SVC/PX: CPT

## 2025-06-27 PROCEDURE — 82607 VITAMIN B-12: CPT

## 2025-06-27 PROCEDURE — 25010000002 CEFTRIAXONE PER 250 MG: Performed by: NURSE PRACTITIONER

## 2025-06-27 PROCEDURE — 92612 ENDOSCOPY SWALLOW (FEES) VID: CPT

## 2025-06-27 PROCEDURE — 99232 SBSQ HOSP IP/OBS MODERATE 35: CPT

## 2025-06-27 PROCEDURE — 25010000002 AZITHROMYCIN PER 500 MG

## 2025-06-27 PROCEDURE — 25010000002 METHYLPREDNISOLONE PER 40 MG

## 2025-06-27 PROCEDURE — 94761 N-INVAS EAR/PLS OXIMETRY MLT: CPT

## 2025-06-27 PROCEDURE — 82746 ASSAY OF FOLIC ACID SERUM: CPT

## 2025-06-27 PROCEDURE — 97162 PT EVAL MOD COMPLEX 30 MIN: CPT

## 2025-06-27 PROCEDURE — 94664 DEMO&/EVAL PT USE INHALER: CPT

## 2025-06-27 PROCEDURE — 92610 EVALUATE SWALLOWING FUNCTION: CPT

## 2025-06-27 PROCEDURE — 97530 THERAPEUTIC ACTIVITIES: CPT

## 2025-06-27 PROCEDURE — 25810000003 SODIUM CHLORIDE 0.9 % SOLUTION 250 ML FLEX CONT

## 2025-06-27 RX ADMIN — BACLOFEN 5 MG: 10 TABLET ORAL at 09:15

## 2025-06-27 RX ADMIN — AZITHROMYCIN DIHYDRATE 500 MG: 500 INJECTION, POWDER, LYOPHILIZED, FOR SOLUTION INTRAVENOUS at 20:55

## 2025-06-27 RX ADMIN — Medication 10 ML: at 09:21

## 2025-06-27 RX ADMIN — IPRATROPIUM BROMIDE AND ALBUTEROL SULFATE 3 ML: 2.5; .5 SOLUTION RESPIRATORY (INHALATION) at 12:23

## 2025-06-27 RX ADMIN — PANTOPRAZOLE SODIUM 40 MG: 40 TABLET, DELAYED RELEASE ORAL at 06:05

## 2025-06-27 RX ADMIN — IPRATROPIUM BROMIDE AND ALBUTEROL SULFATE 3 ML: 2.5; .5 SOLUTION RESPIRATORY (INHALATION) at 07:52

## 2025-06-27 RX ADMIN — ATORVASTATIN CALCIUM 40 MG: 40 TABLET, FILM COATED ORAL at 20:56

## 2025-06-27 RX ADMIN — BACLOFEN 5 MG: 10 TABLET ORAL at 20:56

## 2025-06-27 RX ADMIN — NICOTINE 1 PATCH: 21 PATCH, EXTENDED RELEASE TRANSDERMAL at 09:18

## 2025-06-27 RX ADMIN — Medication 10 ML: at 20:56

## 2025-06-27 RX ADMIN — LISINOPRIL 10 MG: 10 TABLET ORAL at 09:17

## 2025-06-27 RX ADMIN — BUDESONIDE AND FORMOTEROL FUMARATE DIHYDRATE 2 PUFF: 160; 4.5 AEROSOL RESPIRATORY (INHALATION) at 20:00

## 2025-06-27 RX ADMIN — BACLOFEN 5 MG: 10 TABLET ORAL at 16:28

## 2025-06-27 RX ADMIN — CEFTRIAXONE 1000 MG: 2 INJECTION, POWDER, FOR SOLUTION INTRAMUSCULAR; INTRAVENOUS at 16:28

## 2025-06-27 RX ADMIN — DULOXETINE 60 MG: 60 CAPSULE, DELAYED RELEASE ORAL at 09:15

## 2025-06-27 RX ADMIN — IPRATROPIUM BROMIDE AND ALBUTEROL SULFATE 3 ML: 2.5; .5 SOLUTION RESPIRATORY (INHALATION) at 19:53

## 2025-06-27 RX ADMIN — PRAZOSIN HYDROCHLORIDE 2 MG: 1 CAPSULE ORAL at 20:55

## 2025-06-27 RX ADMIN — DOXYCYCLINE 100 MG: 100 CAPSULE ORAL at 09:15

## 2025-06-27 RX ADMIN — METHYLPREDNISOLONE SODIUM SUCCINATE 40 MG: 40 INJECTION, POWDER, FOR SOLUTION INTRAMUSCULAR; INTRAVENOUS at 09:17

## 2025-06-27 RX ADMIN — METOPROLOL SUCCINATE 25 MG: 25 TABLET, EXTENDED RELEASE ORAL at 09:15

## 2025-06-27 RX ADMIN — FLUTICASONE PROPIONATE 2 SPRAY: 50 SPRAY, METERED NASAL at 09:18

## 2025-06-27 RX ADMIN — BUDESONIDE AND FORMOTEROL FUMARATE DIHYDRATE 2 PUFF: 160; 4.5 AEROSOL RESPIRATORY (INHALATION) at 07:53

## 2025-06-27 RX ADMIN — IPRATROPIUM BROMIDE AND ALBUTEROL SULFATE 3 ML: 2.5; .5 SOLUTION RESPIRATORY (INHALATION) at 16:36

## 2025-06-27 NOTE — PLAN OF CARE
Goal Outcome Evaluation:  Plan of Care Reviewed With: patient           Outcome Evaluation: PT initial Eval completed.  Pt presents with generalized weakness, balance deficits, SOA/SPEAR, increased O2 requirements, decreased functional endurance, and decreased indep with functional mobility compared to baseline.  Ambulated 56+70ft with CGA and no AD.  Decreased O2 sats to 89% on HFNC; quickly recovers with rest and PLB.  Pt will benefit from skilled IP PT to improve indep and safety with mobility and promote return to PLOF.  Once medically appropriate, rec IPR with focus on pulmonary rehab upon d/c at this time.  Will monitor progress closely and anticipate continued progression with eventual d/c to [facility] and OP Pulmonary Rehab upon d/c.    Anticipated Discharge Disposition (PT): inpatient rehabilitation facility, other (see comments) (with focus on Pulmonary Rehab)                         Subjective   Patient ID: Saroj is a 8 year old male who is accompanied by:mother     Chief Complaint   Patient presents with   • Office Visit   • Vomiting     AFTER HE AEATS IN THE LAST 2 weeks   • Abdominal Pain     sometimes       HPI     Intermittent vomiting x 1 year, worsening x 2 weeks    When he laughs after eating or eats too fast, he vomits.  It will be a large emesis.  NBNB emesis.  He has had 3 episodes in the last 2 weeks.      He will feel nauseated before episodes of emesis.  Sometimes, he will cough and gag before the emesis.    He will occasionally have generalized abdominal pain when vomiting, no other abd pain.     Mom is giving him pepto-bismol as needed after vomiting or abdominal pain.      He has never had normal eating habits.  He has had texture issues.  He has a very limited diet.  He can't eat anything mushy     He has a history of asthma and allergies.  He is taking flovent occasionally.  He is taking an occasional OTC antihistamine.    Hydration: 24oz water daily    Sleep: 9/10pm until 8am.  It takes about an hour to fall asleep    Nutrition: 0-1 servings fruit daily; 0-1 servings vegetables daily; 2-3 servings protein daily.  He is not eating late at night before bed.      Denies fever, diarrhea, constipation, dysuria, hematuria, blood in stool       Review of Systems   All other systems reviewed and are negative.      Objective   Vitals:BP (!) 94/53 (BP Location: RUE - Right upper extremity, Patient Position: Sitting, Cuff Size: Regular)   Pulse 84   Temp 98 °F (36.7 °C) (Tympanic)   Resp 21   Wt 33 kg (72 lb 13.1 oz)   Physical Exam  Vitals reviewed.   Constitutional:       General: He is active. He is not in acute distress.     Appearance: Normal appearance. He is well-developed.   HENT:      Head: Normocephalic and atraumatic.      Right Ear: Tympanic membrane and external ear normal.      Left Ear: Tympanic membrane and external ear normal.      Nose: Nose normal.       Mouth/Throat:      Mouth: Mucous membranes are moist.      Pharynx: Oropharynx is clear. No oropharyngeal exudate or posterior oropharyngeal erythema.   Eyes:      General:         Right eye: No discharge.         Left eye: No discharge.      Extraocular Movements: Extraocular movements intact.      Conjunctiva/sclera: Conjunctivae normal.   Cardiovascular:      Rate and Rhythm: Normal rate and regular rhythm.      Heart sounds: Normal heart sounds, S1 normal and S2 normal.   Pulmonary:      Effort: Pulmonary effort is normal. No respiratory distress.      Breath sounds: Normal breath sounds.   Abdominal:      General: There is no distension.      Palpations: Abdomen is soft. There is no mass.      Tenderness: There is no abdominal tenderness.   Musculoskeletal:         General: Normal range of motion.      Cervical back: Normal range of motion and neck supple.   Skin:     General: Skin is warm and dry.   Neurological:      General: No focal deficit present.      Mental Status: He is alert.      Cranial Nerves: No cranial nerve deficit.      Coordination: Coordination normal.   Psychiatric:         Mood and Affect: Mood normal.         Assessment   Problem List Items Addressed This Visit        Gastrointestinal and Abdominal    Gastroesophageal reflux disease without esophagitis - Primary     The patient's vomiting and nausea is likely due to reflux.  Will do a 4 week trial of famotidine.         Relevant Medications    famotidine (PEPCID) 40 MG/5ML suspension          Instructed to call if problem worsens or does not improve within the next 24 hours otherwise follow-up in 4 weeks.

## 2025-06-27 NOTE — CASE MANAGEMENT/SOCIAL WORK
Continued Stay Note  [facility]     Patient Name: Oscar Avila  MRN: [id number]  Today's Date: 6/27/2025    Admit Date: 6/25/2025    Plan: update   Discharge Plan       Row Name 06/27/25 1224       Plan    Plan update    Patient/Family in Agreement with Plan yes    Plan Comments Spoke with patient at bedside regarding discharge plan and advised CM will continue to follow for any needs he may have.  PT has worked with patient and he is up to chair, may need HH or OPPT.  Hoping to not need anything when time to go home.  CM following.  Patient plan is to discharge home via car with family to transport.    Final Discharge Disposition Code 01 - home or self-care                   Discharge Codes    No documentation.                 Expected Discharge Date and Time       Expected Discharge Date Expected Discharge Time    Jun 28, 2025               Roxie Arnold RN

## 2025-06-27 NOTE — THERAPY EVALUATION
Acute Care - Speech Language Pathology   Swallow Initial Evaluation [facility]  Clinical Swallow Evaluation      Patient Name: Oscar Avila  : 1960  MRN: [id number]  Today's Date: 2025               Admit Date: 2025    Visit Dx:     ICD-10-CM ICD-9-CM   1. SOB (shortness of breath)  R06.02 786.05   2. Hypoxia  R09.02 799.02   3. Pneumonia of both lower lobes due to infectious organism  J18.9 486   4. Leukocytosis, unspecified type  D72.829 288.60   5. Hyponatremia  E87.1 276.1   6. Dysphagia, unspecified type  R13.10 787.20     Patient Active Problem List   Diagnosis    COPD (chronic obstructive pulmonary disease)    Hyperlipidemia    Coronary artery calcification    Emphysema, unspecified    Current smoker    Alcoholism in recovery    Primary hypertension    Polyp of colon    ANGELA (obstructive sleep apnea)    Acute hypoxic respiratory failure    Sepsis    Pneumonia    Hyponatremia    Abdominal pain    Bronchitis    COPD (chronic obstructive pulmonary disease)    Tobacco abuse    GERD without esophagitis    Ileus     Past Medical History:   Diagnosis Date    Alcohol use     Hyperlipidemia     Hypertension     Neuromuscular disorder      Past Surgical History:   Procedure Laterality Date    CARPAL TUNNEL RELEASE Left     CARPAL TUNNEL RELEASE Right 2023    Dr. Esa Acosta    FOOT FRACTURE SURGERY Right 2018    fracture to right foot - has a metal pin on great toe    WISDOM TOOTH EXTRACTION         SLP Recommendation and Plan  SLP Swallowing Diagnosis: functional oral phase, R/O pharyngeal dysphagia (25 0845)  SLP Diet Recommendation: regular textures, thin liquids (Continue physician ordered diet) (25 0845)  Recommended Precautions and Strategies: general aspiration precautions (25 0845)  SLP Rec. for Method of Medication Administration: meds whole, meds crushed, with puree, as tolerated (25 0845)     Monitor for Signs of Aspiration: yes, notify SLP  if any concerns (06/27/25 0845)  Recommended Diagnostics: FEES (06/27/25 0845)  Swallow Criteria for Skilled Therapeutic Interventions Met: demonstrates skilled criteria (06/27/25 0845)  Anticipated Discharge Disposition (SLP): unknown (06/27/25 0845)  Rehab Potential/Prognosis, Swallowing: good, to achieve stated therapy goals (06/27/25 0845)     Predicted Duration Therapy Intervention (Days): 1 week (06/27/25 0845)  Oral Care Recommendations: Oral Care BID/PRN, Toothbrush (06/27/25 0845)                                               SWALLOW EVALUATION (Last 72 Hours)       SLP Adult Swallow Evaluation       Row Name 06/27/25 0845                   Rehab Evaluation    Document Type evaluation  -SM        Subjective Information no complaints  -SM        Patient Observations alert;cooperative  -SM        Patient Effort good  -SM        Symptoms Noted During/After Treatment none  -SM           General Information    Patient Profile Reviewed yes  -SM        Pertinent History Of Current Problem 65 y/o male admitted for shortness of breath, cough. Hx of COPD. CXR revealed RLL pneumonia.  -SM        Current Method of Nutrition regular textures;thin liquids  -SM        Precautions/Limitations, Vision WFL with corrective lenses;for purposes of eval  -SM        Precautions/Limitations, Hearing WFL;for purposes of eval  -SM        Prior Level of Function-Communication unknown  -SM        Prior Level of Function-Swallowing no diet consistency restrictions  -SM        Plans/Goals Discussed with patient;agreed upon  -SM        Barriers to Rehab none identified  -SM        Patient's Goals for Discharge patient did not state  -SM           Pain    Pretreatment Pain Rating 0/10 - no pain  -SM        Posttreatment Pain Rating 0/10 - no pain  -SM           Oral Motor Structure and Function    Dentition Assessment edentulous, does not have dentures  -SM        Secretion Management WNL/WFL  -SM        Mucosal Quality moist, healthy   -SM           Oral Musculature and Cranial Nerve Assessment    Oral Motor General Assessment generalized oral motor weakness  -SM        Lingual Impairment, Detail. Cranial Nerves IX, XII (Glossopharyngeal and Hypoglossal) reduced strength;reduced strength left  -SM           General Eating/Swallowing Observations    Respiratory Support Currently in Use high-flow nasal cannula  -SM        O2 Liters 5L  -SM        Eating/Swallowing Skills self-fed;fed by SLP  -        Positioning During Eating upright in bed  -SM        Utensils Used spoon;straw  -SM        Consistencies Trialed regular textures;ice chips;thin liquids;pureed;nectar/syrup-thick liquids  -SM        Pre SpO2 (%) 93  -SM        Post SpO2 (%) 90  -SM           Respiratory    Respiratory Status increase in respiratory rate;during swallowing/eating  -           Clinical Swallow Eval    Oral Prep Phase WFL  -SM        Oral Transit WFL  -SM        Oral Residue WFL  -SM        Pharyngeal Phase suspected pharyngeal impairment  -SM        Esophageal Phase unremarkable  -SM        Clinical Swallow Evaluation Summary Small cough/TC with sips of thin via straw. Inc respiratory rate with all trials. Pt having difficulty with hiccups for past several weeks, have been causing him discomfort. MD would like to rule out a relation to swallowing issues. Not on oxygen at home.  -           Pharyngeal Phase Concerns    Pharyngeal Phase Concerns cough;throat clear  -SM        Cough thin  -SM        Throat Clear thin  -SM           SLP Evaluation Clinical Impression    SLP Swallowing Diagnosis functional oral phase;R/O pharyngeal dysphagia  -        Functional Impact risk of aspiration/pneumonia  -        Rehab Potential/Prognosis, Swallowing good, to achieve stated therapy goals  -        Swallow Criteria for Skilled Therapeutic Interventions Met demonstrates skilled criteria  -           Recommendations    Predicted Duration Therapy Intervention (Days) 1 week   -        SLP Diet Recommendation regular textures;thin liquids  Continue physician ordered diet  -        Recommended Diagnostics FEES  -        Recommended Precautions and Strategies general aspiration precautions  -        Oral Care Recommendations Oral Care BID/PRN;Toothbrush  -        SLP Rec. for Method of Medication Administration meds whole;meds crushed;with puree;as tolerated  -        Monitor for Signs of Aspiration yes;notify SLP if any concerns  -        Anticipated Discharge Disposition (SLP) unknown  -                  User Key  (r) = Recorded By, (t) = Taken By, (c) = Cosigned By      Initials Name Effective Dates    Alida Rayo MS CF-SLP 06/05/25 -                     EDUCATION  The patient has been educated in the following areas:   Dysphagia (Swallowing Impairment).                Time Calculation:    Time Calculation- SLP       Row Name 06/27/25 0918             Time Calculation- [facility]    SLP Start Time 0845  -      SLP Received On 06/27/25  -         Untimed Charges    33161-DI Eval Oral Pharyng Swallow Minutes 50  -SM         Total Minutes    Untimed Charges Total Minutes 50  -SM       Total Minutes 50  -SM                User Key  (r) = Recorded By, (t) = Taken By, (c) = Cosigned By      Initials Name Provider Type    Alida Rayo MS CF-SLP Speech and Language Pathologist                    Therapy Charges for Today       Code Description Service Date Service Provider Modifiers Qty    [id number] HC ST EVAL ORAL PHARYNG SWALLOW 3 6/27/2025 Alida Lees MS CF-SLP GN 1                 MS DARÍO Restrepo-SLP  6/27/2025

## 2025-06-27 NOTE — PLAN OF CARE
Goal Outcome Evaluation:  Plan of Care Reviewed With: patient                Anticipated Discharge Disposition (SLP): unknown          SLP Swallowing Diagnosis: functional oral phase, R/O pharyngeal dysphagia (06/27/25 0496)

## 2025-06-27 NOTE — PLAN OF CARE
Goal Outcome Evaluation:  Plan of Care Reviewed With: patient                Anticipated Discharge Disposition (SLP): inpatient rehabilitation facility          SLP Swallowing Diagnosis: moderate, pharyngeal dysphagia (06/27/25 6568)

## 2025-06-27 NOTE — PROGRESS NOTES
[facility] Medicine Services  PROGRESS NOTE    Patient Name: Oscar Avila  : 1960  MRN: [id number]    Date of Admission: 2025  Primary Care Physician: Concepción Quigley DO    Subjective   Subjective     CC:  Pneumonia    HPI:  No significant overnight events, patient remains on high flow nasal cannula oxygen however subjectively states he feels much better.  Remains on antibiotics and steroids doing well otherwise no complaints.  Positive for Legionella's      Objective   Objective     Vital Signs:   Temp:  [97.7 °F (36.5 °C)-98 °F (36.7 °C)] 97.7 °F (36.5 °C)  Heart Rate:  [] 74  Resp:  [18-20] 20  BP: (108-120)/(64-78) 120/78  Flow (L/min) (Oxygen Therapy):  [50] 50     Physical Exam:  Constitutional: No acute distress, awake, alert  HENT: NCAT, mucous membranes moist  Respiratory: Clear to auscultation bilaterally, respiratory effort normal   Cardiovascular: RRR, no murmurs, rubs, or gallops  Gastrointestinal: Positive bowel sounds, soft, nontender, nondistended  Musculoskeletal: No bilateral ankle edema  Psychiatric: Appropriate affect, cooperative  Neurologic: Oriented x 3, strength symmetric in all extremities, Cranial Nerves grossly intact to confrontation, speech clear  Skin: No rashes     Results Reviewed:  LAB RESULTS:      Lab 25  0026 25  1924 25  1649 25  1539   WBC 14.61*  --   --  17.53*   HEMOGLOBIN 13.1  --   --  13.2   HEMATOCRIT 40.6  --   --  39.4   PLATELETS 263  --   --  280   NEUTROS ABS 12.90*  --   --  15.49*   IMMATURE GRANS (ABS) 0.07*  --   --  0.09*   LYMPHS ABS 0.61*  --   --  0.50*   MONOS ABS 1.00*  --   --  1.43*   EOS ABS 0.00  --   --  0.00   .8*  --   --  98.0*   CRP  --   --   --  34.44*   PROCALCITONIN  --   --   --  0.43*   LACTATE  --  0.9  --  2.1*   HSTROP T  --   --  15 14         Lab 25  1003 25  0821 25  0035 25  1852 25  0846 25  0026 25  1539    SODIUM 132* 133* 130* 126* 127* 131* 128*   POTASSIUM  --   --   --   --   --  4.5 5.0   CHLORIDE  --   --   --   --   --  99 94*   CO2  --   --   --   --   --  16.1* 20.0*   ANION GAP  --   --   --   --   --  15.9* 14.0   BUN  --   --   --   --   --  18.9 23.6*   CREATININE  --   --   --   --   --  1.07 1.17   EGFR  --   --   --   --   --  77.0 69.2   GLUCOSE  --   --   --   --   --  112* 97   CALCIUM  --   --   --   --   --  8.3* 8.6   MAGNESIUM  --   --   --   --   --  2.0  --          Lab 06/25/25  1539   TOTAL PROTEIN 7.1   ALBUMIN 3.6   GLOBULIN 3.5   ALT (SGPT) 9   AST (SGOT) 21   BILIRUBIN 0.4   ALK PHOS 94   LIPASE 22         Lab 06/25/25  1649 06/25/25  1539   PROBNP  --  483.8   HSTROP T 15 14             Lab 06/27/25  1003   FOLATE 6.52   VITAMIN B 12 568         Lab 06/25/25  2306 06/25/25  1636   PH, ARTERIAL 7.416 7.507*   PCO2, ARTERIAL 31.7* 24.6*   PO2 ART 58.3* 66.4*   FIO2 32 28   HCO3 ART 20.4 19.5*   BASE EXCESS ART -3.4* -2.1*   CARBOXYHEMOGLOBIN 1.1 1.8     Brief Urine Lab Results  (Last result in the past 365 days)        Color   Clarity   Blood   Leuk Est   Nitrite   Protein   CREAT   Urine HCG        06/25/25 2230 Dark Yellow   Clear   Negative   Small (1+)   Negative   30 mg/dL (1+)                   Microbiology Results Abnormal       Procedure Component Value - Date/Time    Legionella Antigen, Urine - Urine, Urine, Clean Catch [[id number]]  (Abnormal) Collected: 06/26/25 1753    Lab Status: Final result Specimen: Urine, Clean Catch Updated: 06/27/25 0628     LEGIONELLA ANTIGEN, URINE Positive            SLP FEES - Fiberoptic Endo Eval Swallow  Result Date: 6/27/2025  This procedure was auto-finalized with no dictation required.    CT Abdomen Pelvis Without Contrast  Result Date: 6/26/2025  CT ABDOMEN PELVIS WO CONTRAST Date of Exam: 6/25/2025 11:07 PM EDT Indication: left sided abdominal pain. Comparison: Chest CT 6/25/2025 Technique: Axial CT images were obtained of the abdomen and  pelvis without the administration of contrast. Reconstructed coronal and sagittal images were also obtained. Automated exposure control and iterative construction methods were used. Findings: Emphysema noted in the lung bases. There is densely consolidating right lower lobe pneumonia with air bronchograms. Opacities in the dependent left lower lobe likely reflect some atelectasis. There is coronary artery disease and atherosclerotic disease. No abdominal aortic aneurysm is identified. Gallbladder is distended but otherwise normal. No biliary obstruction is seen. There is excreted contrast in the kidneys, ureters, and bladder from the prior chest CT. There is nonspecific perirenal fat stranding this is probably some bland edema although pyelonephritis should be excluded clinically. The kidneys are nonobstructed and otherwise grossly normal in appearance. There is some atrophy of the pancreas. Solid abdominal organs are otherwise normal. Urinary bladder and prostate gland appear normal. The appendix is normal. There is colonic diverticulosis. No focal diverticulitis is identified, and there is no definite evidence of acute colitis. There are some prominent small bowel loops in the lower abdomen, and there are multiple gas-filled small bowel loops as well suggesting a generalized ileus rather than a bowel obstruction. Stomach is normal except for a small hiatal hernia. No adenopathy is seen. There is a retroaortic left renal vein. No free fluid. There are chronic compression fractures at L5 and L1.     Impression: 1.Densely consolidating right lower lobe pneumonia. 2.Nonspecific perirenal fat stranding. This is probably bland edema although pyelonephritis should be excluded clinically. No hydronephrosis. 3.Colonic diverticulosis without evidence of diverticulitis. 4.There are some prominent small bowel loops in the lower abdomen, and there are multiple gas-filled small bowel loops as well suggesting a generalized ileus  rather than a bowel obstruction. 5.Small hiatal hernia. 6.Additional findings as given above. Electronically Signed: Derrell Denton MD  6/26/2025 12:33 AM EDT  Workstation ID: [id number]    CT Angiogram Chest Pulmonary Embolism  Result Date: 6/25/2025  CT ANGIOGRAM CHEST PULMONARY EMBOLISM Date of Exam: 6/25/2025 6:29 PM EDT Indication: Pulmonary Embolism. Comparison: 5/23/2024 Technique: Axial CT images were obtained of the chest after the uneventful intravenous administration of 80 cc Isovue-370 IV contrast utilizing pulmonary embolism protocol.  In addition, a 3-D volume rendered image was created for interpretation.  Reconstructed coronal and sagittal images were also obtained. Automated exposure control and iterative construction methods were used. Findings: The thyroid gland is normal. Emphysema. The airway is clear. Atheromatous disease of the coronary vessels. No pulmonary embolus. Right lower lobe posterior consolidation with bronchial wall thickening concerning for bronchitis. Dependent atelectatic changes noted on the left. Mild chronic anterior wedging of L1. Mild chronic anterior wedging of T7-T9. Exaggerated kyphosis of the thoracic spine. The sternum is intact. No acute rib fractures.     Impression: No pulmonary embolus. Right lower lobe consolidation with bronchial wall thickening concerning for bronchitis. Electronically Signed: Javier Mohan MD  6/25/2025 7:13 PM EDT  Workstation ID: [id number]      Results for orders placed during the hospital encounter of 06/25/25    Adult Transthoracic Echo Complete W/ Cont if Necessary Per Protocol    Interpretation Summary    Technically difficult study.    Left ventricular ejection fraction appears to be 51 - 55%.    Normal right ventricular wall thickness and systolic function noted. The right ventricular cavity is mildly dilated.    No hemodynamically significant valvular stenosis or regurgitation    The left atrial cavity is moderately dilated.      Current  medications:  Scheduled Meds:atorvastatin, 40 mg, Oral, Nightly  baclofen, 5 mg, Oral, TID  budesonide-formoterol, 2 puff, Inhalation, BID - RT  cefTRIAXone, 1,000 mg, Intravenous, Q24H  doxycycline, 100 mg, Oral, Q12H  DULoxetine, 60 mg, Oral, Daily  fluticasone, 2 spray, Each Nare, Daily  ipratropium-albuterol, 3 mL, Nebulization, 4x Daily - RT  lisinopril, 10 mg, Oral, Daily  methylPREDNISolone sodium succinate, 40 mg, Intravenous, Daily  metoprolol succinate XL, 25 mg, Oral, Daily  nicotine, 1 patch, Transdermal, Q24H  pantoprazole, 40 mg, Oral, Q AM  prazosin, 2 mg, Oral, Nightly  sodium chloride, 10 mL, Intravenous, Q12H      Continuous Infusions:     PRN Meds:.  acetaminophen    ipratropium-albuterol    ketorolac    sodium chloride    [COMPLETED] Insert Peripheral IV **AND** sodium chloride    sodium chloride    sodium chloride    Assessment & Plan   Assessment & Plan     Active Hospital Problems    Diagnosis  POA    **Acute hypoxic respiratory failure [J96.01]  Yes    Ileus [K56.7]  Yes    Sepsis [A41.9]  Yes    Pneumonia [J18.9]  Yes    Hyponatremia [E87.1]  Yes    Abdominal pain [R10.9]  Yes    Bronchitis [J40]  Yes    COPD (chronic obstructive pulmonary disease) [J44.9]  Yes    Tobacco abuse [Z72.0]  Yes    GERD without esophagitis [K21.9]  Yes    Primary hypertension [I10]  Yes    Hyperlipidemia [E78.5]  Yes      Resolved Hospital Problems   No resolved problems to display.        Brief Hospital Course to date:  Oscar Avila is a 64 y.o. male w/ a hx of COPD, ongoing tobacco use, HTN, HLD who presented to the ED w/ c/o shortness of breath and coughing.      Acute hypoxic respiratory failure   Sepsis 2/2 Pneumonia   Bronchitis/Hx of COPD   Tobacco use  Leukocytosis  COPD exacerbation  Legionella pneumonia  meets sepsis criteria based on HR 112bpm, temp 102.3, 87% on RA, WBC 17.53, CRP 34.44. procal 0.43, lactic acid 2.1 and + source (PNA per CTA Chest)   noted to be 87% on room air, currently  requiring high flow nasal cannula oxygen  Symptomatic from RLL pneumonia and COPD exacerbation evident by increasing oxygen requirements, increased phlegm production and shortness of breath  ABG c/w hypoxia (pH 7.5, CO2 24, O2 66.4)  CTA Chest: Densely consolidating RLL pneumonia, bronchitis, and nonspecific perirenal fat stranding negative for PE  bedside dysphagia screen   Blood cultures NGTD, urine cultures negative, MRSA swab negative, streptococcal urine antigen negative  Legionella urine antigen positive  respiratory panel negative   S/p doxycycline and Rocephin, given the patient is requiring high flow nasal cannula oxygen would categorize this as moderate to severe disease, QTc is 413, will transition patient to azithromycin as first-line agent  s/p NS 2,430ml given in ED  prn and scheduled nebs   continue Flonase, Advair/Wixela (sub Symbicort)  smoking cessation education   nicotine patch   pt/ot and case mgt consult, recommending inpatient rehab  Echo TTE showed EF of 51 to 55% with no diastolic dysfunction noted     Hyponatremia - Legionella related  sodium 127 (139 yesterday at 11am), possibly SIADH in the setting of pneumonia versus hypovolemic hyponatremia  Recheck sodium tomorrow a.m. with BMP  Serum osmolarity 279, urine osmolarity 731, urine sodium 24  s/p 2,430ml NS bolus given in ED  S/p NS @ 75ml/hour x 12 hours (adjust as needed), hold off on additional fluids  no evidence of cirrhosis on CT abdomen with liver enzymes WNL  Echo TTE without heart failure and normal EF  Hyponatremia likely in the setting of Legionella pneumonia, improving with antibiotic coverage     Abdominal pain  Hiccups  likely musculoskeletal in nature; abdominal exam benign, pt c/o pain w/ movement, coughing and w/ hiccups   pt also reported falling on Friday and again Tuesday night- evaluated in ED Tuesday afternoon   CT abdomen pelvis showed densely consolidated RLL pneumonia, nonspecific perirenal fat stranding with  probable bland edema, colonic diverticulosis, prominent small bowel loops in lower abdomen and multiple gas-filled small bowel loops suggesting a generalized ileus rather than bowel obstruction and a small hiatal hernia  Patient on steroids for COPD exacerbation, have significantly helped, will add on baclofen as well  S/p evaluation by SLP, s/p FEES, found to have moderate pharyngeal dysphagia  Dietary recommendations soft to chew textures, chopped, honey thick liquids, no mixed consistencies      HTN  HLD  EKG stable  proBNP and troponin both WNL   continue routine statin   continue Toprol, lisinopril, prazosin w/ hold parameters      GERD  continue PPI    Expected Discharge Location and Transportation: TBD  Expected Discharge TBD  Expected Discharge Date: 6/28/2025; Expected Discharge Time:      VTE Prophylaxis:  Mechanical VTE prophylaxis orders are present.    Total time spent: Time Spent: Time Spent: 40 minutes  Time spent includes time reviewing chart, face-to-face time, counseling patient/family/caregiver, ordering medications/tests/procedures, communicating with other health care professionals, documenting clinical information in the electronic health record, and coordination of care.      AM-PAC 6 Clicks Score (PT): 20 (06/27/25 6826)    CODE STATUS:   Code Status and Medical Interventions: CPR (Attempt to Resuscitate); Full Support   Ordered at: 06/25/25 2015     Code Status (Patient has no pulse and is not breathing):    CPR (Attempt to Resuscitate)     Medical Interventions (Patient has pulse or is breathing):    Full Support       Shantell Martell MD  06/27/25

## 2025-06-27 NOTE — THERAPY EVALUATION
Patient Name: Oscar Avila  : 1960    MRN: [id number]                              Today's Date: 2025       Admit Date: 2025    Visit Dx:     ICD-10-CM ICD-9-CM   1. SOB (shortness of breath)  R06.02 786.05   2. Hypoxia  R09.02 799.02   3. Pneumonia of both lower lobes due to infectious organism  J18.9 486   4. Leukocytosis, unspecified type  D72.829 288.60   5. Hyponatremia  E87.1 276.1   6. Pharyngeal dysphagia  R13.13 787.23     Patient Active Problem List   Diagnosis    COPD (chronic obstructive pulmonary disease)    Hyperlipidemia    Coronary artery calcification    Emphysema, unspecified    Current smoker    Alcoholism in recovery    Primary hypertension    Polyp of colon    ANGELA (obstructive sleep apnea)    Acute hypoxic respiratory failure    Sepsis    Pneumonia    Hyponatremia    Abdominal pain    Bronchitis    COPD (chronic obstructive pulmonary disease)    Tobacco abuse    GERD without esophagitis    Ileus     Past Medical History:   Diagnosis Date    Alcohol use     Hyperlipidemia     Hypertension     Neuromuscular disorder      Past Surgical History:   Procedure Laterality Date    CARPAL TUNNEL RELEASE Left     CARPAL TUNNEL RELEASE Right 2023    Dr. Esa Acosta    FOOT FRACTURE SURGERY Right 2018    fracture to right foot - has a metal pin on great toe    WISDOM TOOTH EXTRACTION        General Information       Row Name 25 1353          Physical Therapy Time and Intention    Document Type evaluation  -BA     Mode of Treatment physical therapy;individual therapy  -BA       Row Name 25 1351          General Information    Patient Profile Reviewed yes  -BA     Prior Level of Function independent:;bed mobility;transfer;gait;using stairs;all household mobility;community mobility;driving;ADL's;min assist:;mod assist:;home management  Ambulates with no AD at baseline. Has FWW available if needed. Hx falls, with last fall just PTA; reported possible syncopal  episodes. No O2 at home. Participates in some home management activities.  -     Existing Precautions/Restrictions fall;oxygen therapy device and L/min;other (see comments)  HFNC  -     Barriers to Rehab medically complex  -       Row Name 06/27/25 1353          Living Environment    Current Living Arrangements home  -     People in Home spouse  -       Row Name 06/27/25 1353          Home Main Entrance    Number of Stairs, Main Entrance none;other (see comments)  0 steps through garage basement entry.  -       Row Name 06/27/25 1353          Stairs Within Home, Primary    Stairs, Within Home, Primary Lives in split level home with 6-7 steps+landing+6-7 steps from garage basement level up to upper level.  Bedroom is on upper level.  -     Number of Stairs, Within Home, Primary other (see comments)  -     Stair Railings, Within Home, Primary railing on left side (ascending);railing on right side (ascending);other (see comments)  L handrail for first 6-7 steps from garage basement level and R handrail for 2nd 6-7 steps  -       Row Name 06/27/25 1353          Cognition    Orientation Status (Cognition) oriented x 3  -       Row Name 06/27/25 1353          Safety Issues/Impairments Affecting Functional Mobility    Safety Issues Affecting Function (Mobility) awareness of need for assistance;insight into deficits/self-awareness;safety precaution awareness;safety precautions follow-through/compliance  -     Impairments Affecting Function (Mobility) balance;coordination;endurance/activity tolerance;motor planning;pain;postural/trunk control;shortness of breath;strength  -               User Key  (r) = Recorded By, (t) = Taken By, (c) = Cosigned By      Initials Name Provider Type     Miguelina Reveles, PT Physical Therapist                   Mobility       Row Name 06/27/25 1401          Bed Mobility    Bed Mobility supine-sit;scooting/bridging  -     Scooting/Bridging [place] (Bed  Mobility) standby assist  -     Supine-Sit [place] (Bed Mobility) standby assist;verbal cues  -     Assistive Device (Bed Mobility) bed rails;head of bed elevated  -     Comment, (Bed Mobility) Reported no dizziness upon sitting EOB.  VCs for pacing and PLB.  -       Row Name 06/27/25 1401          Sit-Stand Transfer    Sit-Stand [place] (Transfers) contact guard;standby assist  -     Comment, (Sit-Stand Transfer) STS x 2 reps with CGA from EOB progressing to SBA from chair.  Reported no dizziness upon standing.  -       Row Name 06/27/25 1401          Gait/Stairs (Locomotion)    [place] Level (Gait) contact guard;verbal cues;nonverbal cues (demo/gesture)  -     Patient was able to Ambulate yes  -     Distance in Feet (Gait) 56  +70  -     Deviations/Abnormal Patterns (Gait) bilateral deviations;aamir decreased;gait speed decreased;stride length decreased  -     Bilateral Gait Deviations forward flexed posture;heel strike decreased  -     Comment, (Gait/Stairs) Demo'd step through gait pattern.  Exhibited 1 mild LOB when turning/changing directions that pt was able to self-correct.  SOA/SPEAR noted; required 1 seated rest break.  O2 sats decreased to 89% on HFNC; quickly recovered to >90% w/in ~10 sec with seated rest and focus on PLB.  VCs/TCs for PLB, pacing, environmental navigation d/t limited by HFNC line, and upright posture.  Gait distance limited by fatigue and SOA.  -               User Key  (r) = Recorded By, (t) = Taken By, (c) = Cosigned By      Initials Name Provider Type    Miguelina Molina, PT Physical Therapist                   Obj/Interventions       Row Name 06/27/25 1403          Range of Motion Comprehensive    General Range of Motion bilateral lower extremity ROM WFL  -       Row Name 06/27/25 1409          Strength Comprehensive (MMT)    General Manual Muscle Testing (MMT) Assessment lower extremity strength deficits identified  -     Comment,  General Manual Muscle Testing (MMT) Assessment BLE grossly 4 to 4+/5.  -       Row Name 06/27/25 1409          Motor Skills    Motor Skills coordination;functional endurance  -     Coordination gross motor deficit;bilateral;lower extremity;minimal impairment;WFL  -     Functional Endurance Decreased functional endurance.  Fatigues with activity with SOA/SPEAR and decreased O2 sats on HFNC.  Able to quickly recover with rest and PLB.  -       Row Name 06/27/25 1409          Balance    Balance Assessment sitting static balance;sitting dynamic balance;standing static balance;standing dynamic balance  -     Static Sitting Balance standby assist  -     Dynamic Sitting Balance contact guard  -     Position, Sitting Balance unsupported;sitting edge of bed;sitting in chair  -     Static Standing Balance standby assist  -     Dynamic Standing Balance contact guard  -     Position/Device Used, Standing Balance unsupported  -     Balance Interventions sitting;sit to stand;standing;static;dynamic;occupation based/functional task  -     Comment, Balance Intermittent mild instability with standing and ambulation activity with no AD.  Exhibited 1 mild LOB with turns/changing directions that pt was able to self-correct.  -       Row Name 06/27/25 1409          Sensory Assessment (Somatosensory)    Sensory Assessment (Somatosensory) LE sensation intact  -               User Key  (r) = Recorded By, (t) = Taken By, (c) = Cosigned By      Initials Name Provider Type     Miguelina Reveles, PT Physical Therapist                   Goals/Plan       Row Name 06/27/25 1421          Bed Mobility Goal 1 (PT)    Activity/Assistive Device (Bed Mobility Goal 1, PT) sit to supine/supine to sit  -     [place] Level/Cues Needed (Bed Mobility Goal 1, PT) independent  -     Time Frame (Bed Mobility Goal 1, PT) short term goal (STG);5 days  -       Row Name 06/27/25 1425          Transfer Goal 1 (PT)     Activity/Assistive Device (Transfer Goal 1, PT) sit-to-stand/stand-to-sit;bed-to-chair/chair-to-bed  -BA     [place] Level/Cues Needed (Transfer Goal 1, PT) supervision required  -BA     Time Frame (Transfer Goal 1, PT) long term goal (LTG);10 days  -BA       Row Name 06/27/25 1423          Gait Training Goal 1 (PT)    Activity/Assistive Device (Gait Training Goal 1, PT) gait (walking locomotion);improve balance and speed;increase endurance/gait distance  -BA     [place] Level (Gait Training Goal 1, PT) standby assist  -BA     Distance (Gait Training Goal 1, PT) 300ft  -BA     Time Frame (Gait Training Goal 1, PT) long term goal (LTG);10 days  -BA       Row Name 06/27/25 1423          Stairs Goal 1 (PT)    Activity/Assistive Device (Stairs Goal 1, PT) ascending stairs;descending stairs;using handrail, left;using handrail, right  -BA     [place] Level/Cues Needed (Stairs Goal 1, PT) contact guard required  -BA     Number of Stairs (Stairs Goal 1, PT) 12  -BA     Time Frame (Stairs Goal 1, PT) long term goal (LTG);10 days  -BA       Row Name 06/27/25 1423          Therapy Assessment/Plan (PT)    Planned Therapy Interventions (PT) balance training;bed mobility training;gait training;home exercise program;motor coordination training;neuromuscular re-education;patient/family education;postural re-education;stair training;strengthening;transfer training  -BA               User Key  (r) = Recorded By, (t) = Taken By, (c) = Cosigned By      Initials Name Provider Type    Miguelina Molina, PT Physical Therapist                   Clinical Impression       Row Name 06/27/25 1414          Pain    Pretreatment Pain Rating 3/10  -BA     Posttreatment Pain Rating 2/10  -BA     Pain Location back;abdomen  -BA     Pain Side/Orientation generalized  -BA     Pain Management Interventions exercise or physical activity utilized;activity modification encouraged;positioning techniques utilized;nursing notified  -BA      Response to Pain Interventions activity participation with decreased pain  -BA     Pre/Posttreatment Pain Comment Reported c/o back and abdominal pain from hiccups.  Improved with mobility.  -       Row Name 06/27/25 1414          Plan of Care Review    Plan of Care Reviewed With patient  -BA     Outcome Evaluation PT initial Eval completed.  Pt presents with generalized weakness, balance deficits, SOA/SPEAR, increased O2 requirements, decreased functional endurance, and decreased indep with functional mobility compared to baseline.  Ambulated 56+70ft with CGA and no AD.  Decreased O2 sats to 89% on HFNC; quickly recovers with rest and PLB.  Pt will benefit from skilled IP PT to improve indep and safety with mobility and promote return to PLOF.  Once medically appropriate, rec IPR with focus on pulmonary rehab upon d/c at this time.  Will monitor progress closely and anticipate continued progression with eventual d/c to [facility] and OP Pulmonary Rehab upon d/c.  -       Row Name 06/27/25 1414          Therapy Assessment/Plan (PT)    Patient/Family Therapy Goals Statement (PT) to return home and to PLOF  -BA     Rehab Potential (PT) good  -     Criteria for Skilled Interventions Met (PT) yes;meets criteria;skilled treatment is necessary  -     Therapy Frequency (PT) daily  -BA     Predicted Duration of Therapy Intervention (PT) 10 days  -       Row Name 06/27/25 1414          Vital Signs    Pre Systolic BP Rehab 118  supine  -BA     Pre Treatment Diastolic BP 72  -BA     Intra Systolic BP Rehab 129  sitting EOB; 123/78 standing  -BA     Intra Treatment Diastolic BP 71  -BA     Post Systolic BP Rehab 118  sitting in chair following ambulation  -BA     Post Treatment Diastolic BP 79  -BA     Pretreatment Heart Rate (beats/min) 97  -BA     Posttreatment Heart Rate (beats/min) 101  -BA     Pre SpO2 (%) 93  -BA     O2 Delivery Pre Treatment hi-flow  -BA     Intra SpO2 (%) 89   -BA     O2 Delivery Intra Treatment  hi-flow  -BA     Post SpO2 (%) 95  -BA     O2 Delivery Post Treatment hi-flow  -BA     Pre Patient Position Supine  -BA     Intra Patient Position Standing  -BA     Post Patient Position Sitting  -BA       Row Name 06/27/25 1414          Positioning and Restraints    Pre-Treatment Position in bed  -BA     Post Treatment Position chair  -BA     In Chair notified nsg;reclined;call light within reach;encouraged to call for assist;exit alarm on;waffle cushion;legs elevated;heels elevated  -               User Key  (r) = Recorded By, (t) = Taken By, (c) = Cosigned By      Initials Name Provider Type    Miguelina Molina, PT Physical Therapist                   Outcome Measures       Row Name 06/27/25 1426          How much help from another person do you currently need...    Turning from your back to your side while in flat bed without using bedrails? 4  -BA     Moving from lying on back to sitting on the side of a flat bed without bedrails? 3  -BA     Moving to and from a bed to a chair (including a wheelchair)? 3  -BA     Standing up from a chair using your arms (e.g., wheelchair, bedside chair)? 4  -BA     Climbing 3-5 steps with a railing? 3  -BA     To walk in hospital room? 3  -BA     AM-PAC 6 Clicks Score (PT) 20  -BA     Highest Level of Mobility Goal Walk 10 Steps or More-6  -BA       Row Name 06/27/25 1426          Functional Assessment    Outcome Measure Options AM-PAC 6 Clicks Basic Mobility (PT)  -               User Key  (r) = Recorded By, (t) = Taken By, (c) = Cosigned By      Initials Name Provider Type    Miguelina Molina, PT Physical Therapist                                 Physical Therapy Education       Title: PT OT SLP Therapies (In Progress)       Topic: Physical Therapy (In Progress)       Point: Mobility training (Done)       Learning Progress Summary            Patient Acceptance, E, VU,NR by AMARI at 6/27/2025 1426                      Point: Home exercise program (Not Started)        Learner Progress:  Not documented in this visit.              Point: Body mechanics (Done)       Learning Progress Summary            Patient Acceptance, E, VU,NR by BA at 6/27/2025 1426                      Point: Precautions (Done)       Learning Progress Summary            Patient Acceptance, E, VU,NR by BA at 6/27/2025 1426                                      User Key       Initials Effective Dates Name Provider Type Discipline    AMARI 09/21/21 -  Miguelina Reveles, PT Physical Therapist PT                  PT Recommendation and Plan  Planned Therapy Interventions (PT): balance training, bed mobility training, gait training, home exercise program, motor coordination training, neuromuscular re-education, patient/family education, postural re-education, stair training, strengthening, transfer training  Outcome Evaluation: PT initial Eval completed.  Pt presents with generalized weakness, balance deficits, SOA/SPEAR, increased O2 requirements, decreased functional endurance, and decreased indep with functional mobility compared to baseline.  Ambulated 56+70ft with CGA and no AD.  Decreased O2 sats to 89% on HFNC; quickly recovers with rest and PLB.  Pt will benefit from skilled IP PT to improve indep and safety with mobility and promote return to PLOF.  Once medically appropriate, rec IPR with focus on pulmonary rehab upon d/c at this time.  Will monitor progress closely and anticipate continued progression with eventual d/c to HWA and OP Pulmonary Rehab upon d/c.     Time Calculation:   PT Evaluation Complexity  History, PT Evaluation Complexity: 3 or more personal factors and/or comorbidities  Examination of Body Systems (PT Eval Complexity): total of 3 or more elements  Clinical Presentation (PT Evaluation Complexity): evolving  Clinical Decision Making (PT Evaluation Complexity): moderate complexity  Overall Complexity (PT Evaluation Complexity): moderate complexity     PT Charges       Row Name 06/27/25 8875              Time Calculation    Start Time 1052  -BA      PT Received On 06/27/25  -BA      PT Goal Re-Cert Due Date 07/07/25  -BA         Time Calculation- PT    Total Timed Code Minutes- PT 9 minute(s)  -BA         Timed Charges    19189 - PT Therapeutic Activity Minutes 9  -BA         Untimed Charges    PT Eval/Re-eval Minutes 68  -BA         Total Minutes    Timed Charges Total Minutes 9  -BA      Untimed Charges Total Minutes 68  -BA       Total Minutes 77  -BA                User Key  (r) = Recorded By, (t) = Taken By, (c) = Cosigned By      Initials Name Provider Type    Miguelina Molina, PT Physical Therapist                  Therapy Charges for Today       Code Description Service Date Service Provider Modifiers Qty    46673266770 HC PT THERAPEUTIC ACT EA 15 MIN 6/27/2025 Miguelina Reveles, PT GP 1    [id number] HC-PT EVAL MOD COMPLEXITY 5 6/27/2025 Miguelina Reveles, PT  1            PT G-Codes  Outcome Measure Options: AM-PAC 6 Clicks Basic Mobility (PT)  AM-PAC 6 Clicks Score (PT): 20  AM-PAC 6 Clicks Score (OT): 14  PT Discharge Summary  Anticipated Discharge Disposition (PT): inpatient rehabilitation facility, other (see comments) (with focus on Pulmonary Rehab)    Miguelina Reveles PT  6/27/2025

## 2025-06-27 NOTE — PLAN OF CARE
Problem: Adult Inpatient Plan of Care  Goal: Absence of Hospital-Acquired Illness or Injury  Intervention: Identify and Manage Fall Risk  Recent Flowsheet Documentation  Taken 6/27/2025 0400 by Ricardo Ferreira RN  Safety Promotion/Fall Prevention:   safety round/check completed   room organization consistent   nonskid shoes/slippers when out of bed   lighting adjusted   fall prevention program maintained   clutter free environment maintained   assistive device/personal items within reach  Taken 6/27/2025 0200 by Ricardo Ferreira RN  Safety Promotion/Fall Prevention:   safety round/check completed   room organization consistent   nonskid shoes/slippers when out of bed   lighting adjusted   fall prevention program maintained   clutter free environment maintained   assistive device/personal items within reach  Taken 6/27/2025 0000 by Ricardo Ferreira RN  Safety Promotion/Fall Prevention:   safety round/check completed   room organization consistent   nonskid shoes/slippers when out of bed   lighting adjusted   fall prevention program maintained   clutter free environment maintained   assistive device/personal items within reach  Taken 6/26/2025 2200 by Ricardo Ferreira RN  Safety Promotion/Fall Prevention:   safety round/check completed   room organization consistent   nonskid shoes/slippers when out of bed   lighting adjusted   fall prevention program maintained   clutter free environment maintained   assistive device/personal items within reach  Taken 6/26/2025 2022 by Ricardo Ferreira RN  Safety Promotion/Fall Prevention:   safety round/check completed   room organization consistent   nonskid shoes/slippers when out of bed   muscle strengthening facilitated   mobility aid in reach   lighting adjusted   gait belt   fall prevention program maintained   clutter free environment maintained   assistive device/personal items within reach     Problem: Adult Inpatient Plan of Care  Goal: Absence of Hospital-Acquired  Illness or Injury  Intervention: Prevent and Manage VTE (Venous Thromboembolism) Risk  Recent Flowsheet Documentation  Taken 6/26/2025 2022 by Ricardo Ferreira RN  VTE Prevention/Management: SCDs (sequential compression devices) on     Problem: Adult Inpatient Plan of Care  Goal: Absence of Hospital-Acquired Illness or Injury  Intervention: Prevent and Manage VTE (Venous Thromboembolism) Risk  Recent Flowsheet Documentation  Taken 6/26/2025 2022 by Ricardo Ferreira RN  VTE Prevention/Management: SCDs (sequential compression devices) on     Problem: Fall Injury Risk  Goal: Absence of Fall and Fall-Related Injury  Intervention: Promote Injury-Free Environment  Recent Flowsheet Documentation  Taken 6/27/2025 0400 by Ricardo Ferreira RN  Safety Promotion/Fall Prevention:   safety round/check completed   room organization consistent   nonskid shoes/slippers when out of bed   lighting adjusted   fall prevention program maintained   clutter free environment maintained   assistive device/personal items within reach  Taken 6/27/2025 0200 by Ricardo Ferreira RN  Safety Promotion/Fall Prevention:   safety round/check completed   room organization consistent   nonskid shoes/slippers when out of bed   lighting adjusted   fall prevention program maintained   clutter free environment maintained   assistive device/personal items within reach  Taken 6/27/2025 0000 by Ricardo Ferreira RN  Safety Promotion/Fall Prevention:   safety round/check completed   room organization consistent   nonskid shoes/slippers when out of bed   lighting adjusted   fall prevention program maintained   clutter free environment maintained   assistive device/personal items within reach  Taken 6/26/2025 2200 by Ricardo Ferreira RN  Safety Promotion/Fall Prevention:   safety round/check completed   room organization consistent   nonskid shoes/slippers when out of bed   lighting adjusted   fall prevention program maintained   clutter free environment  maintained   assistive device/personal items within reach  Taken 6/26/2025 2022 by Ricardo Ferreira, RN  Safety Promotion/Fall Prevention:   safety round/check completed   room organization consistent   nonskid shoes/slippers when out of bed   muscle strengthening facilitated   mobility aid in reach   lighting adjusted   gait belt   fall prevention program maintained   clutter free environment maintained   assistive device/personal items within reach   Goal Outcome Evaluation:  Plan of Care Reviewed With: patient        Progress: improving

## 2025-06-27 NOTE — MBS/VFSS/FEES
Acute Care - Speech Language Pathology   Swallow Initial Evaluation [facility]  Fiberoptic Endoscopic Evaluation of Swallowing (FEES)      Patient Name: Oscar Avila  : 1960  MRN: [id number]  Today's Date: 2025               Admit Date: 2025    Visit Dx:     ICD-10-CM ICD-9-CM   1. SOB (shortness of breath)  R06.02 786.05   2. Hypoxia  R09.02 799.02   3. Pneumonia of both lower lobes due to infectious organism  J18.9 486   4. Leukocytosis, unspecified type  D72.829 288.60   5. Hyponatremia  E87.1 276.1   6. Pharyngeal dysphagia  R13.13 787.23     Patient Active Problem List   Diagnosis    COPD (chronic obstructive pulmonary disease)    Hyperlipidemia    Coronary artery calcification    Emphysema, unspecified    Current smoker    Alcoholism in recovery    Primary hypertension    Polyp of colon    ANGELA (obstructive sleep apnea)    Acute hypoxic respiratory failure    Sepsis    Pneumonia    Hyponatremia    Abdominal pain    Bronchitis    COPD (chronic obstructive pulmonary disease)    Tobacco abuse    GERD without esophagitis    Ileus     Past Medical History:   Diagnosis Date    Alcohol use     Hyperlipidemia     Hypertension     Neuromuscular disorder      Past Surgical History:   Procedure Laterality Date    CARPAL TUNNEL RELEASE Left     CARPAL TUNNEL RELEASE Right 2023    Dr. Esa Acosta    FOOT FRACTURE SURGERY Right 2018    fracture to right foot - has a metal pin on great toe    WISDOM TOOTH EXTRACTION         SLP Recommendation and Plan  SLP Swallowing Diagnosis: moderate, pharyngeal dysphagia (25 0930)  SLP Diet Recommendation: soft to chew textures, chopped, honey thick liquids, no mixed consistencies, other (see comments) (Honey via small straw sips only. Modified solids due to respiratory effort required when eating) (25 0930)  Recommended Precautions and Strategies: small bites of food and sips of liquid, general aspiration precautions (25  0930)  SLP Rec. for Method of Medication Administration: meds whole, meds crushed, with puree, as tolerated (06/27/25 0930)     Monitor for Signs of Aspiration: yes, notify SLP if any concerns (06/27/25 0930)  Recommended Diagnostics: FEES (06/27/25 0845)  Swallow Criteria for Skilled Therapeutic Interventions Met: demonstrates skilled criteria (06/27/25 0930)  Anticipated Discharge Disposition (SLP): inpatient rehabilitation facility (06/27/25 0930)  Rehab Potential/Prognosis, Swallowing: good, to achieve stated therapy goals (06/27/25 0930)  Therapy Frequency (Swallow): 5 days per week (06/27/25 0930)  Predicted Duration Therapy Intervention (Days): 1 week (06/27/25 0930)  Oral Care Recommendations: Oral Care BID/PRN, Toothbrush (06/27/25 0930)                                               SWALLOW EVALUATION (Last 72 Hours)       SLP Adult Swallow Evaluation       Row Name 06/27/25 1100 06/27/25 0930 06/27/25 0845             Rehab Evaluation    Document Type -- evaluation  -SM evaluation  -SM      Subjective Information -- no complaints  -SM no complaints  -SM      Patient Observations -- alert;cooperative  -SM alert;cooperative  -SM      Patient Effort -- good  -SM good  -SM      Comment -- In collaboration with   - --      Symptoms Noted During/After Treatment -- none  -SM none  -SM         General Information    Patient Profile Reviewed -- yes  -SM yes  -SM      Pertinent History Of Current Problem -- See previous eval  -SM 65 y/o male admitted for shortness of breath, cough. Hx of COPD. CXR revealed RLL pneumonia.  -SM      Current Method of Nutrition -- regular textures;thin liquids  -SM regular textures;thin liquids  -SM      Precautions/Limitations, Vision -- WFL with corrective lenses;for purposes of eval  -SM WFL with corrective lenses;for purposes of eval  -SM      Precautions/Limitations, Hearing -- WFL;for purposes of eval  -SM WFL;for purposes of eval  -SM      Prior Level of  Function-Communication -- unknown  - unknown  -      Prior Level of Function-Swallowing -- no diet consistency restrictions  - no diet consistency restrictions  -      Plans/Goals Discussed with -- patient;agreed upon  - patient;agreed upon  -      Barriers to Rehab -- none identified  - none identified  -      Patient's Goals for Discharge -- patient did not state  - patient did not state  -         Pain    Pretreatment Pain Rating -- 0/10 - no pain  - 0/10 - no pain  -      Posttreatment Pain Rating -- 0/10 - no pain  - 0/10 - no pain  -         Oral Motor Structure and Function    Dentition Assessment -- -- edentulous, does not have dentures  -      Secretion Management -- -- WNL/WFL  -      Mucosal Quality -- -- moist, healthy  -         Oral Musculature and Cranial Nerve Assessment    Oral Motor General Assessment -- -- generalized oral motor weakness  -      Lingual Impairment, Detail. Cranial Nerves IX, XII (Glossopharyngeal and Hypoglossal) -- -- reduced strength;reduced strength left  -         General Eating/Swallowing Observations    Respiratory Support Currently in Use -- -- high-flow nasal cannula  -      O2 Liters -- -- 5L  -      Eating/Swallowing Skills -- -- self-fed;fed by SLP  -      Positioning During Eating -- -- upright in bed  -      Utensils Used -- -- spoon;straw  -      Consistencies Trialed -- -- regular textures;ice chips;thin liquids;pureed;nectar/syrup-thick liquids  -      Pre SpO2 (%) -- -- 93  -      Post SpO2 (%) -- -- 90  -         Respiratory    Respiratory Status -- -- increase in respiratory rate;during swallowing/eating  -         Clinical Swallow Eval    Oral Prep Phase -- -- WFL  -      Oral Transit -- -- WFL  -      Oral Residue -- -- WFL  -      Pharyngeal Phase -- -- suspected pharyngeal impairment  -      Esophageal Phase -- -- unremarkable  -      Clinical Swallow Evaluation Summary -- -- Small cough/TC  with sips of thin via straw. Inc respiratory rate with all trials. Pt having difficulty with hiccups for past several weeks, have been causing him discomfort. MD would like to rule out a relation to swallowing issues. Not on oxygen at home.  -SM         Pharyngeal Phase Concerns    Pharyngeal Phase Concerns -- -- cough;throat clear  -SM      Cough -- -- thin  -SM      Throat Clear -- -- thin  -SM         Fiberoptic Endoscopic Evaluation of Swallowing (FEES)    Risks/Benefits Reviewed -- risks/benefits explained;patient;agreed to eval  -SM --      Nasal Entry -- left:  -SM --      Scope serial number/identification -- [id number]  -SM --         Anatomy and Physiology    Anatomic Considerations --  -SM no anatomic structural deviation  -SM --      Velopharyngeal --  -SM CNA  -SM --      Base of Tongue --  -SM symmetrical;range adequate  -SM --      Epiglottis --  -SM WFL;other (see comments)  Misshapen  -SM --      Laryngeal Function Breathing --  -SM symmetrical  -SM --      Laryngeal Function Phonation --  -SM symmetrical  -SM --      Laryngeal Function to Breath Hold --  -SM CNA  -SM --      Secretion Rating Scale (Jose Francisco et al. 1996) --  -SM 0- normal, no visible secretions  -SM --      Sensory --  -SM sensed scope  -SM --      Utensils Used --  -SM Spoon;Cup;Straw  -SM --      Consistencies Trialed --  -SM thin liquids;nectar-thick liquids;honey-thick liquids;pudding/puree;regular textures  -SM --         FEES Interpretation    Oral Phase --  -SM prespill of liquids into pharynx  -SM --         Initiation of Pharyngeal Swallow    Initiation of Pharyngeal Swallow --  -SM bolus in pyriform sinuses  -SM --      Pharyngeal Phase --  -SM impaired pharyngeal phase of swallowing  -SM --      Penetration Before the Swallow -- thin liquids;nectar-thick liquids;honey-thick liquids;pudding/puree;secondary to delayed swallow initiation or mistiming;other (see comments)  Transient with pudding thick  -SM --      Penetration During  the Swallow --  -SM thin liquids;nectar-thick liquids;honey-thick liquids;secondary to delayed swallow initiation or mistiming;other (see comments)  Honey with cup only  -SM --      Aspiration During the Swallow --  -SM thin liquids;secondary to delayed swallow initiation or mistiming  -SM --      Depth of Penetration --  -SM deep  -SM --      Response to Penetration --  -SM Yes  -SM --      Responsed to penetration with --  -SM inconsistent;spontaneous swallow;non-effective  -SM --      Response to Aspiration --  -SM Yes  -SM --      Responsed to aspiration with -- spontaneous swallow;non-effective  -SM --      Rosenbek's Scale --  -SM thin:;7-->Level 7;pudding/puree:;2-->Level 2;regular textures:;1-->Level 1;nectar:;honey:;5-->Level 5  Honey via large cup sip level 5, honey via small straw sip level 1  -SM --      Residue -- no significant pharyngeal residue noted  -SM --      Attempted Compensatory Maneuvers --  -SM bolus size;bolus presentation style  -SM --      Response to Attempted Compensatory Maneuvers --  -SM prevented penetration;other (see comments)  With honey thick only  -SM --      Successful Compensatory Maneuver Competency --  -SM patient able to;demonstrate compensations;independently  -SM --      FEES Summary --  -SM Moderate pharyngeal dysphagia characterized by silent aspiration of thin liquids, deep penetration of nectar and honey thick (from cup sip only). Discoordination in respiration and swallow contributing to difficulties. Small straw sip of honey thick liquids did not enter airway, pt able to demonstrate understanding of compensation.  -SM --         SLP Evaluation Clinical Impression    SLP Swallowing Diagnosis --  -SM moderate;pharyngeal dysphagia  -SM functional oral phase;R/O pharyngeal dysphagia  -SM      Functional Impact --  -SM risk of aspiration/pneumonia  -SM risk of aspiration/pneumonia  -SM      Rehab Potential/Prognosis, Swallowing --  -SM good, to achieve stated therapy  goals  -SM good, to achieve stated therapy goals  -SM      Swallow Criteria for Skilled Therapeutic Interventions Met --  -SM demonstrates skilled criteria  -SM demonstrates skilled criteria  -SM         Recommendations    Therapy Frequency (Swallow) --  -SM 5 days per week  -SM --      Predicted Duration Therapy Intervention (Days) --  -SM 1 week  -SM 1 week  -SM      SLP Diet Recommendation --  -SM soft to chew textures;chopped;honey thick liquids;no mixed consistencies;other (see comments)  Honey via small straw sips only. Modified solids due to respiratory effort required when eating  -SM regular textures;thin liquids  Continue physician ordered diet  -      Recommended Diagnostics -- -- FEES  -      Recommended Precautions and Strategies --  -SM small bites of food and sips of liquid;general aspiration precautions  - general aspiration precautions  -      Oral Care Recommendations --  -SM Oral Care BID/PRN;Toothbrush  - Oral Care BID/PRN;Toothbrush  -      SLP Rec. for Method of Medication Administration --  -SM meds whole;meds crushed;with puree;as tolerated  - meds whole;meds crushed;with puree;as tolerated  -      Monitor for Signs of Aspiration --  -SM yes;notify SLP if any concerns  -SM yes;notify SLP if any concerns  -SM      Anticipated Discharge Disposition (SLP) --  - inpatient rehabilitation facility  -SM unknown  -                User Key  (r) = Recorded By, (t) = Taken By, (c) = Cosigned By      Initials Name Effective Dates    Alida Rayo MS CF-SLP 06/05/25 -                     EDUCATION  The patient has been educated in the following areas:   Dysphagia (Swallowing Impairment) Modified Diet Instruction.        SLP GOALS       Row Name 06/27/25 7921             (LTG) Patient will demonstrate functional swallow for    Diet Texture (Demonstrate functional swallow) regular textures  -SM      Liquid viscosity (Demonstrate functional swallow) thin liquids  -SM       [place] (Demonstrate functional swallow) with minimal cues (75-90% accuracy)  -SM      Time Frame (Demonstrate functional swallow) 1 week  -SM      Progress/Outcomes (Demonstrate functional swallow) new goal  -SM         (STG) Patient will tolerate trials of    Consistencies Trialed (Tolerate trials) soft to chew (chopped) textures;honey/ moderately thick liquids  -SM      Desired Outcome (Tolerate trials) without signs/symptoms of aspiration  -SM      [place] (Tolerate trials) with minimal cues (75-90% accuracy)  -SM      Time Frame (Tolerate trials) 1 week  -SM      Progress/Outcomes (Tolerate trials) new goal  -SM         (STG) Patient will tolerate therapeutic trials of    Consistencies Trialed (Tolerate therapeutic trials) regular textures;thin liquids  -SM      Desired Outcome (Tolerate therapeutic trials) without signs/symptoms of aspiration  -SM      [place] (Tolerate therapeutic trials) with 1:1 assist/ supervision  -SM      Time Frame (Tolerate therapeutic trials) 1 week  -SM      Progress/Outcomes (Tolerate therapeutic trials) new goal  -SM      Comment (Tolerate therapeutic trials) Silent aspiration with thins on FEES, cough with thins at bedside  -SM         (STG) Pharyngeal Strengthening Exercise Goal 1 (SLP)    Activity (Pharyngeal Strengthening Goal 1, SLP) increase timing  -SM      Increase Timing prepping - 3 second prep or suck swallow or 3-step swallow;Mendelsohn;super-supraglottic swallow  -SM      [place]/Accuracy (Pharyngeal Strengthening Goal 1, SLP) with minimal cues (75-90% accuracy)  -SM      Time Frame (Pharyngeal Strengthening Goal 1, SLP) 1 week  -SM      Progress/Outcomes (Pharyngeal Strengthening Goal 1, SLP) new goal  -SM         (STG) Swallow Management Recall Goal 1 (SLP)    Activity (Swallow Management Recall Goal 1, SLP) independent recall of;safe diet/liquid level;compensatory swallow strategies/techniques;other (see comments)  Small sips of honey via straw   -SM      [place]/Accuracy (Swallow Management Recall Goal 1, SLP) independently (over 90% accuracy)  -SM      Time Frame (Swallow Management Recall Goal 1, SLP) 1 week  -SM      Progress/Outcomes (Swallow Management Recall Goal 1, SLP) new goal  -SM         (STG) Swallow Compensatory Strategies Goal 1 (SLP)    Activity (Swallow Compensatory Strategies/Techniques Goal 1, SLP) small straw sips  -SM      [place]/Accuracy (Swallow Compensatory Strategies/Techniques Goal 1, SLP) independently (over 90% accuracy)  -SM      Time Frame (Swallow Compensatory Strategies/Techniques Goal 1, SLP) 1 week  -SM      Progress/Outcomes (Swallow Compensatory Strategies/Techniques Goal 1, SLP) new goal  -SM                User Key  (r) = Recorded By, (t) = Taken By, (c) = Cosigned By      Initials Name Provider Type    Alida Rayo MS CF-SLP Speech and Language Pathologist                         Time Calculation:    Time Calculation- SLP       Row Name 06/27/25 1207 06/27/25 0918          Time Calculation- SLP    SLP Start Time 0930  - 0845  -SM     SLP Received On 06/27/25  -SM 06/27/25  -SM        Untimed Charges    06323-AN Eval Oral Pharyng Swallow Minutes -- 50  -SM     27964-VO Fiberoptic Endo Eval Swallow Minutes 90  -SM --        Total Minutes    Untimed Charges Total Minutes 90  -SM 50  -SM      Total Minutes 90  -SM 50  -SM               User Key  (r) = Recorded By, (t) = Taken By, (c) = Cosigned By      Initials Name Provider Type    Alida Rayo MS CF-SLP Speech and Language Pathologist                    Therapy Charges for Today       Code Description Service Date Service Provider Modifiers Qty    [id number] HC ST EVAL ORAL PHARYNG SWALLOW 3 6/27/2025 Alida Lees MS CF-SLP GN 1    [id number] HC ST FIBEROPTIC ENDO EVAL SWALL 6 6/27/2025 Alida Lees MS CF-SLP GN 1                 MS DARÍO Restrepo-SLP  6/27/2025

## 2025-06-28 LAB
ANION GAP SERPL CALCULATED.3IONS-SCNC: 9 MMOL/L (ref 5–15)
BUN SERPL-MCNC: 14.5 MG/DL (ref 8–23)
BUN/CREAT SERPL: 21 (ref 7–25)
CALCIUM SPEC-SCNC: 8.7 MG/DL (ref 8.6–10.5)
CHLORIDE SERPL-SCNC: 104 MMOL/L (ref 98–107)
CO2 SERPL-SCNC: 23 MMOL/L (ref 22–29)
CREAT SERPL-MCNC: 0.69 MG/DL (ref 0.76–1.27)
DEPRECATED RDW RBC AUTO: 43.8 FL (ref 37–54)
EGFRCR SERPLBLD CKD-EPI 2021: 102.7 ML/MIN/1.73
ERYTHROCYTE [DISTWIDTH] IN BLOOD BY AUTOMATED COUNT: 12.1 % (ref 12.3–15.4)
GLUCOSE SERPL-MCNC: 140 MG/DL (ref 65–99)
HCT VFR BLD AUTO: 35.8 % (ref 37.5–51)
HGB BLD-MCNC: 12 G/DL (ref 13–17.7)
MAGNESIUM SERPL-MCNC: 2.2 MG/DL (ref 1.6–2.4)
MCH RBC QN AUTO: 32.7 PG (ref 26.6–33)
MCHC RBC AUTO-ENTMCNC: 33.5 G/DL (ref 31.5–35.7)
MCV RBC AUTO: 97.5 FL (ref 79–97)
PLATELET # BLD AUTO: 300 10*3/MM3 (ref 140–450)
PMV BLD AUTO: 10 FL (ref 6–12)
POTASSIUM SERPL-SCNC: 4.1 MMOL/L (ref 3.5–5.2)
RBC # BLD AUTO: 3.67 10*6/MM3 (ref 4.14–5.8)
SODIUM SERPL-SCNC: 133 MMOL/L (ref 136–145)
SODIUM SERPL-SCNC: 136 MMOL/L (ref 136–145)
SODIUM SERPL-SCNC: 136 MMOL/L (ref 136–145)
WBC NRBC COR # BLD AUTO: 17.34 10*3/MM3 (ref 3.4–10.8)

## 2025-06-28 PROCEDURE — 94799 UNLISTED PULMONARY SVC/PX: CPT

## 2025-06-28 PROCEDURE — 84295 ASSAY OF SERUM SODIUM: CPT | Performed by: NURSE PRACTITIONER

## 2025-06-28 PROCEDURE — 80048 BASIC METABOLIC PNL TOTAL CA: CPT

## 2025-06-28 PROCEDURE — 94761 N-INVAS EAR/PLS OXIMETRY MLT: CPT

## 2025-06-28 PROCEDURE — 83735 ASSAY OF MAGNESIUM: CPT

## 2025-06-28 PROCEDURE — 25010000002 METHYLPREDNISOLONE PER 40 MG

## 2025-06-28 PROCEDURE — 25010000002 CEFTRIAXONE PER 250 MG: Performed by: NURSE PRACTITIONER

## 2025-06-28 PROCEDURE — 85027 COMPLETE CBC AUTOMATED: CPT

## 2025-06-28 PROCEDURE — 99232 SBSQ HOSP IP/OBS MODERATE 35: CPT

## 2025-06-28 PROCEDURE — 25010000002 AZITHROMYCIN PER 500 MG

## 2025-06-28 PROCEDURE — 94664 DEMO&/EVAL PT USE INHALER: CPT

## 2025-06-28 PROCEDURE — 25810000003 SODIUM CHLORIDE 0.9 % SOLUTION 250 ML FLEX CONT

## 2025-06-28 RX ADMIN — METOPROLOL SUCCINATE 25 MG: 25 TABLET, EXTENDED RELEASE ORAL at 09:52

## 2025-06-28 RX ADMIN — PRAZOSIN HYDROCHLORIDE 2 MG: 1 CAPSULE ORAL at 21:35

## 2025-06-28 RX ADMIN — Medication 10 ML: at 09:53

## 2025-06-28 RX ADMIN — IPRATROPIUM BROMIDE AND ALBUTEROL SULFATE 3 ML: 2.5; .5 SOLUTION RESPIRATORY (INHALATION) at 16:23

## 2025-06-28 RX ADMIN — PANTOPRAZOLE SODIUM 40 MG: 40 TABLET, DELAYED RELEASE ORAL at 05:55

## 2025-06-28 RX ADMIN — NICOTINE 1 PATCH: 21 PATCH, EXTENDED RELEASE TRANSDERMAL at 09:54

## 2025-06-28 RX ADMIN — CEFTRIAXONE 1000 MG: 2 INJECTION, POWDER, FOR SOLUTION INTRAMUSCULAR; INTRAVENOUS at 17:30

## 2025-06-28 RX ADMIN — BACLOFEN 5 MG: 10 TABLET ORAL at 17:30

## 2025-06-28 RX ADMIN — Medication 10 ML: at 21:29

## 2025-06-28 RX ADMIN — METHYLPREDNISOLONE SODIUM SUCCINATE 40 MG: 40 INJECTION, POWDER, FOR SOLUTION INTRAMUSCULAR; INTRAVENOUS at 09:52

## 2025-06-28 RX ADMIN — AZITHROMYCIN DIHYDRATE 500 MG: 500 INJECTION, POWDER, LYOPHILIZED, FOR SOLUTION INTRAVENOUS at 21:40

## 2025-06-28 RX ADMIN — BACLOFEN 5 MG: 10 TABLET ORAL at 09:52

## 2025-06-28 RX ADMIN — IPRATROPIUM BROMIDE AND ALBUTEROL SULFATE 3 ML: 2.5; .5 SOLUTION RESPIRATORY (INHALATION) at 12:32

## 2025-06-28 RX ADMIN — BACLOFEN 5 MG: 10 TABLET ORAL at 21:31

## 2025-06-28 RX ADMIN — IPRATROPIUM BROMIDE AND ALBUTEROL SULFATE 3 ML: 2.5; .5 SOLUTION RESPIRATORY (INHALATION) at 09:17

## 2025-06-28 RX ADMIN — BUDESONIDE AND FORMOTEROL FUMARATE DIHYDRATE 2 PUFF: 160; 4.5 AEROSOL RESPIRATORY (INHALATION) at 20:08

## 2025-06-28 RX ADMIN — LISINOPRIL 10 MG: 10 TABLET ORAL at 09:52

## 2025-06-28 RX ADMIN — ATORVASTATIN CALCIUM 40 MG: 40 TABLET, FILM COATED ORAL at 21:35

## 2025-06-28 RX ADMIN — BUDESONIDE AND FORMOTEROL FUMARATE DIHYDRATE 2 PUFF: 160; 4.5 AEROSOL RESPIRATORY (INHALATION) at 09:17

## 2025-06-28 RX ADMIN — FLUTICASONE PROPIONATE 2 SPRAY: 50 SPRAY, METERED NASAL at 09:53

## 2025-06-28 RX ADMIN — DULOXETINE 60 MG: 60 CAPSULE, DELAYED RELEASE ORAL at 09:53

## 2025-06-28 RX ADMIN — IPRATROPIUM BROMIDE AND ALBUTEROL SULFATE 3 ML: 2.5; .5 SOLUTION RESPIRATORY (INHALATION) at 20:08

## 2025-06-28 NOTE — PROGRESS NOTES
[facility] Medicine Services  PROGRESS NOTE    Patient Name: Oscar Avila  : 1960  MRN: [id number]    Date of Admission: 2025  Primary Care Physician: Concepción Quigley DO    Subjective   Subjective     CC:  Pneumonia    HPI:  Patient with legionnaires disease, doing very well this a.m., now on nasal cannula oxygen, got breathing treatment, endorses significant improvement in his symptoms overall.  Lab markers look good, sodium looks good.  No complaints otherwise.      Objective   Objective     Vital Signs:   Temp:  [97.9 °F (36.6 °C)] 97.9 °F (36.6 °C)  Heart Rate:  [] 59  Resp:  [17-20] 17  BP: (122)/(76) 122/76  Flow (L/min) (Oxygen Therapy):  [6-50] 6     Physical Exam:  Constitutional: No acute distress, awake, alert  HENT: NCAT, mucous membranes moist  Respiratory: Clear to auscultation bilaterally, respiratory effort normal   Cardiovascular: RRR, no murmurs, rubs, or gallops  Gastrointestinal: Positive bowel sounds, soft, nontender, nondistended  Musculoskeletal: No bilateral ankle edema  Psychiatric: Appropriate affect, cooperative  Neurologic: Oriented x 3, strength symmetric in all extremities, Cranial Nerves grossly intact to confrontation, speech clear  Skin: No rashes     Results Reviewed:  LAB RESULTS:      Lab 25  0026 25  1924 25  1649 25  1539   WBC 14.61*  --   --  17.53*   HEMOGLOBIN 13.1  --   --  13.2   HEMATOCRIT 40.6  --   --  39.4   PLATELETS 263  --   --  280   NEUTROS ABS 12.90*  --   --  15.49*   IMMATURE GRANS (ABS) 0.07*  --   --  0.09*   LYMPHS ABS 0.61*  --   --  0.50*   MONOS ABS 1.00*  --   --  1.43*   EOS ABS 0.00  --   --  0.00   .8*  --   --  98.0*   CRP  --   --   --  34.44*   PROCALCITONIN  --   --   --  0.43*   LACTATE  --  0.9  --  2.1*   HSTROP T  --   --  15 14         Lab 25  0841 25  2335 25  1003 25  0821 25  0035 25  0846 25  0026 25  1539    SODIUM 136 133* 132* 133* 130*   < > 131* 128*   POTASSIUM 4.1  --   --   --   --   --  4.5 5.0   CHLORIDE 104  --   --   --   --   --  99 94*   CO2 23.0  --   --   --   --   --  16.1* 20.0*   ANION GAP 9.0  --   --   --   --   --  15.9* 14.0   BUN 14.5  --   --   --   --   --  18.9 23.6*   CREATININE 0.69*  --   --   --   --   --  1.07 1.17   EGFR 102.7  --   --   --   --   --  77.0 69.2   GLUCOSE 140*  --   --   --   --   --  112* 97   CALCIUM 8.7  --   --   --   --   --  8.3* 8.6   MAGNESIUM 2.2  --   --   --   --   --  2.0  --     < > = values in this interval not displayed.         Lab 06/25/25  1539   TOTAL PROTEIN 7.1   ALBUMIN 3.6   GLOBULIN 3.5   ALT (SGPT) 9   AST (SGOT) 21   BILIRUBIN 0.4   ALK PHOS 94   LIPASE 22         Lab 06/25/25  1649 06/25/25  1539   PROBNP  --  483.8   HSTROP T 15 14             Lab 06/27/25  1003   FOLATE 6.52   VITAMIN B 12 568         Lab 06/25/25  2306 06/25/25  1636   PH, ARTERIAL 7.416 7.507*   PCO2, ARTERIAL 31.7* 24.6*   PO2 ART 58.3* 66.4*   FIO2 32 28   HCO3 ART 20.4 19.5*   BASE EXCESS ART -3.4* -2.1*   CARBOXYHEMOGLOBIN 1.1 1.8     Brief Urine Lab Results  (Last result in the past 365 days)        Color   Clarity   Blood   Leuk Est   Nitrite   Protein   CREAT   Urine HCG        06/25/25 2230 Dark Yellow   Clear   Negative   Small (1+)   Negative   30 mg/dL (1+)                   Microbiology Results Abnormal       Procedure Component Value - Date/Time    Legionella Antigen, Urine - Urine, Urine, Clean Catch [[id number]]  (Abnormal) Collected: 06/26/25 5589    Lab Status: Final result Specimen: Urine, Clean Catch Updated: 06/27/25 0628     LEGIONELLA ANTIGEN, URINE Positive            SLP FEES - Fiberoptic Endo Eval Swallow  Result Date: 6/27/2025  This procedure was auto-finalized with no dictation required.      Results for orders placed during the hospital encounter of 06/25/25    Adult Transthoracic Echo Complete W/ Cont if Necessary Per Protocol    Interpretation  Summary    Technically difficult study.    Left ventricular ejection fraction appears to be 51 - 55%.    Normal right ventricular wall thickness and systolic function noted. The right ventricular cavity is mildly dilated.    No hemodynamically significant valvular stenosis or regurgitation    The left atrial cavity is moderately dilated.      Current medications:  Scheduled Meds:atorvastatin, 40 mg, Oral, Nightly  azithromycin, 500 mg, Intravenous, Q24H  baclofen, 5 mg, Oral, TID  budesonide-formoterol, 2 puff, Inhalation, BID - RT  cefTRIAXone, 1,000 mg, Intravenous, Q24H  DULoxetine, 60 mg, Oral, Daily  fluticasone, 2 spray, Each Nare, Daily  ipratropium-albuterol, 3 mL, Nebulization, 4x Daily - RT  lisinopril, 10 mg, Oral, Daily  methylPREDNISolone sodium succinate, 40 mg, Intravenous, Daily  metoprolol succinate XL, 25 mg, Oral, Daily  nicotine, 1 patch, Transdermal, Q24H  pantoprazole, 40 mg, Oral, Q AM  prazosin, 2 mg, Oral, Nightly  sodium chloride, 10 mL, Intravenous, Q12H      Continuous Infusions:     PRN Meds:.  acetaminophen    ipratropium-albuterol    ketorolac    sodium chloride    [COMPLETED] Insert Peripheral IV **AND** sodium chloride    sodium chloride    sodium chloride    Assessment & Plan   Assessment & Plan     Active Hospital Problems    Diagnosis  POA    **Acute hypoxic respiratory failure [J96.01]  Yes    Ileus [K56.7]  Yes    Sepsis [A41.9]  Yes    Pneumonia [J18.9]  Yes    Hyponatremia [E87.1]  Yes    Abdominal pain [R10.9]  Yes    Bronchitis [J40]  Yes    COPD (chronic obstructive pulmonary disease) [J44.9]  Yes    Tobacco abuse [Z72.0]  Yes    GERD without esophagitis [K21.9]  Yes    Primary hypertension [I10]  Yes    Hyperlipidemia [E78.5]  Yes      Resolved Hospital Problems   No resolved problems to display.        Brief Hospital Course to date:  Oscar Avila is a 64 y.o. male w/ a hx of COPD, ongoing tobacco use, HTN, HLD who presented to the ED w/ c/o shortness of breath and  coughing.      Acute hypoxic respiratory failure   Sepsis 2/2 Pneumonia   Bronchitis/Hx of COPD   Tobacco use  Leukocytosis - related to steroid  COPD exacerbation  Legionella pneumonia  Legionnaires disease  meets sepsis criteria based on HR 112bpm, temp 102.3, 87% on RA, WBC 17.53, CRP 34.44. procal 0.43, lactic acid 2.1 and + source (PNA per CTA Chest)   noted to be 87% on room air, currently requiring high flow nasal cannula oxygen  Symptomatic from RLL pneumonia and COPD exacerbation evident by increasing oxygen requirements, increased phlegm production and shortness of breath  ABG c/w hypoxia (pH 7.5, CO2 24, O2 66.4)  CTA Chest: Densely consolidating RLL pneumonia, bronchitis, and nonspecific perirenal fat stranding negative for PE  bedside dysphagia screen   Blood cultures NGTD, urine cultures negative, MRSA swab negative, streptococcal urine antigen negative  Legionella urine antigen positive  respiratory panel negative   S/p doxycycline and Rocephin, given the patient is requiring high flow nasal cannula oxygen would categorize this as moderate to severe disease, QTc is 413, will transition patient to azithromycin as first-line agent  Will likely need to be treated for 10 to 14 days, while hospitalized will treat with IV azithromycin transition to p.o. closer to discharge  Source is unclear at this time, have asked family to check his HVAC unit at home  s/p NS 2,430ml given in ED  prn and scheduled nebs   continue Flonase, Advair/Wixela (sub Symbicort)  smoking cessation education   nicotine patch   pt/ot and case mgt consult, recommending inpatient rehab  Echo TTE showed EF of 51 to 55% with no diastolic dysfunction noted     Hyponatremia - Legionella related - resolved  sodium 127 (136 today), due to legionnaires disease   recheck sodium tomorrow a.m. with BMP  Serum osmolarity 279, urine osmolarity 731, urine sodium 24  s/p 2,430ml NS bolus given in ED  S/p NS @ 75ml/hour x 12 hours (adjust as needed),  hold off on additional fluids  no evidence of cirrhosis on CT abdomen with liver enzymes WNL  Echo TTE without heart failure and normal EF  Hyponatremia likely in the setting of Legionella pneumonia, improving with antibiotic coverage     Abdominal pain  Hiccups  likely musculoskeletal in nature; abdominal exam benign, pt c/o pain w/ movement, coughing and w/ hiccups   pt also reported falling on Friday and again Tuesday night- evaluated in ED Tuesday afternoon   CT abdomen pelvis showed densely consolidated RLL pneumonia, nonspecific perirenal fat stranding with probable bland edema, colonic diverticulosis, prominent small bowel loops in lower abdomen and multiple gas-filled small bowel loops suggesting a generalized ileus rather than bowel obstruction and a small hiatal hernia  Patient on steroids for COPD exacerbation, have significantly helped, will add on baclofen as well  S/p evaluation by SLP, s/p FEES, found to have moderate pharyngeal dysphagia  Dietary recommendations soft to chew textures, chopped, honey thick liquids, no mixed consistencies  Will need repeat evaluation by SLP on Monday     HTN  HLD  EKG stable  proBNP and troponin both WNL   continue routine statin   continue Toprol, lisinopril, prazosin w/ hold parameters      GERD  continue PPI    Expected Discharge Location and Transportation: TBD  Expected Discharge TBD  Expected Discharge Date: 6/28/2025; Expected Discharge Time:      VTE Prophylaxis:  Mechanical VTE prophylaxis orders are present.    Total time spent: Time Spent: Time Spent: 40 minutes  Time spent includes time reviewing chart, face-to-face time, counseling patient/family/caregiver, ordering medications/tests/procedures, communicating with other health care professionals, documenting clinical information in the electronic health record, and coordination of care.      AM-PAC 6 Clicks Score (PT): 20 (06/27/25 1376)    CODE STATUS:   Code Status and Medical Interventions: CPR (Attempt  to Resuscitate); Full Support   Ordered at: 06/25/25 2015     Code Status (Patient has no pulse and is not breathing):    CPR (Attempt to Resuscitate)     Medical Interventions (Patient has pulse or is breathing):    Full Support       Shantell Martell MD  06/28/25

## 2025-06-29 LAB
ANION GAP SERPL CALCULATED.3IONS-SCNC: 11.3 MMOL/L (ref 5–15)
BUN SERPL-MCNC: 11.5 MG/DL (ref 8–23)
BUN/CREAT SERPL: 17.2 (ref 7–25)
CALCIUM SPEC-SCNC: 8.4 MG/DL (ref 8.6–10.5)
CHLORIDE SERPL-SCNC: 108 MMOL/L (ref 98–107)
CO2 SERPL-SCNC: 21.7 MMOL/L (ref 22–29)
CREAT SERPL-MCNC: 0.67 MG/DL (ref 0.76–1.27)
DEPRECATED RDW RBC AUTO: 45.2 FL (ref 37–54)
EGFRCR SERPLBLD CKD-EPI 2021: 103.6 ML/MIN/1.73
ERYTHROCYTE [DISTWIDTH] IN BLOOD BY AUTOMATED COUNT: 12.4 % (ref 12.3–15.4)
GLUCOSE SERPL-MCNC: 100 MG/DL (ref 65–99)
HCT VFR BLD AUTO: 36 % (ref 37.5–51)
HGB BLD-MCNC: 12.2 G/DL (ref 13–17.7)
MAGNESIUM SERPL-MCNC: 2 MG/DL (ref 1.6–2.4)
MCH RBC QN AUTO: 33.7 PG (ref 26.6–33)
MCHC RBC AUTO-ENTMCNC: 33.9 G/DL (ref 31.5–35.7)
MCV RBC AUTO: 99.4 FL (ref 79–97)
PLATELET # BLD AUTO: 351 10*3/MM3 (ref 140–450)
PMV BLD AUTO: 9.6 FL (ref 6–12)
POTASSIUM SERPL-SCNC: 3.9 MMOL/L (ref 3.5–5.2)
RBC # BLD AUTO: 3.62 10*6/MM3 (ref 4.14–5.8)
SODIUM SERPL-SCNC: 137 MMOL/L (ref 136–145)
SODIUM SERPL-SCNC: 141 MMOL/L (ref 136–145)
SODIUM SERPL-SCNC: 141 MMOL/L (ref 136–145)
WBC NRBC COR # BLD AUTO: 15.42 10*3/MM3 (ref 3.4–10.8)

## 2025-06-29 PROCEDURE — 25810000003 SODIUM CHLORIDE 0.9 % SOLUTION 250 ML FLEX CONT

## 2025-06-29 PROCEDURE — 25010000002 AZITHROMYCIN PER 500 MG

## 2025-06-29 PROCEDURE — 25010000002 CEFTRIAXONE PER 250 MG: Performed by: NURSE PRACTITIONER

## 2025-06-29 PROCEDURE — 85027 COMPLETE CBC AUTOMATED: CPT

## 2025-06-29 PROCEDURE — 80048 BASIC METABOLIC PNL TOTAL CA: CPT

## 2025-06-29 PROCEDURE — 84295 ASSAY OF SERUM SODIUM: CPT | Performed by: NURSE PRACTITIONER

## 2025-06-29 PROCEDURE — 83735 ASSAY OF MAGNESIUM: CPT

## 2025-06-29 PROCEDURE — 25010000002 METHYLPREDNISOLONE PER 40 MG

## 2025-06-29 PROCEDURE — 99232 SBSQ HOSP IP/OBS MODERATE 35: CPT

## 2025-06-29 PROCEDURE — 94761 N-INVAS EAR/PLS OXIMETRY MLT: CPT

## 2025-06-29 PROCEDURE — 94799 UNLISTED PULMONARY SVC/PX: CPT

## 2025-06-29 RX ORDER — AZITHROMYCIN 250 MG/1
500 TABLET, FILM COATED ORAL
Status: DISCONTINUED | OUTPATIENT
Start: 2025-06-30 | End: 2025-07-03 | Stop reason: HOSPADM

## 2025-06-29 RX ADMIN — METHYLPREDNISOLONE SODIUM SUCCINATE 40 MG: 40 INJECTION, POWDER, FOR SOLUTION INTRAMUSCULAR; INTRAVENOUS at 08:50

## 2025-06-29 RX ADMIN — PRAZOSIN HYDROCHLORIDE 2 MG: 1 CAPSULE ORAL at 20:57

## 2025-06-29 RX ADMIN — BACLOFEN 5 MG: 10 TABLET ORAL at 20:57

## 2025-06-29 RX ADMIN — BACLOFEN 5 MG: 10 TABLET ORAL at 16:49

## 2025-06-29 RX ADMIN — IPRATROPIUM BROMIDE AND ALBUTEROL SULFATE 3 ML: 2.5; .5 SOLUTION RESPIRATORY (INHALATION) at 18:44

## 2025-06-29 RX ADMIN — DULOXETINE 60 MG: 60 CAPSULE, DELAYED RELEASE ORAL at 08:50

## 2025-06-29 RX ADMIN — Medication 10 ML: at 20:58

## 2025-06-29 RX ADMIN — CEFTRIAXONE 1000 MG: 2 INJECTION, POWDER, FOR SOLUTION INTRAMUSCULAR; INTRAVENOUS at 16:48

## 2025-06-29 RX ADMIN — PANTOPRAZOLE SODIUM 40 MG: 40 TABLET, DELAYED RELEASE ORAL at 05:24

## 2025-06-29 RX ADMIN — AZITHROMYCIN DIHYDRATE 500 MG: 500 INJECTION, POWDER, LYOPHILIZED, FOR SOLUTION INTRAVENOUS at 16:49

## 2025-06-29 RX ADMIN — BUDESONIDE AND FORMOTEROL FUMARATE DIHYDRATE 2 PUFF: 160; 4.5 AEROSOL RESPIRATORY (INHALATION) at 18:44

## 2025-06-29 RX ADMIN — IPRATROPIUM BROMIDE AND ALBUTEROL SULFATE 3 ML: 2.5; .5 SOLUTION RESPIRATORY (INHALATION) at 13:23

## 2025-06-29 RX ADMIN — Medication 10 ML: at 08:50

## 2025-06-29 RX ADMIN — METOPROLOL SUCCINATE 25 MG: 25 TABLET, EXTENDED RELEASE ORAL at 08:50

## 2025-06-29 RX ADMIN — ATORVASTATIN CALCIUM 40 MG: 40 TABLET, FILM COATED ORAL at 20:57

## 2025-06-29 RX ADMIN — LISINOPRIL 10 MG: 10 TABLET ORAL at 08:50

## 2025-06-29 RX ADMIN — IPRATROPIUM BROMIDE AND ALBUTEROL SULFATE 3 ML: 2.5; .5 SOLUTION RESPIRATORY (INHALATION) at 16:14

## 2025-06-29 RX ADMIN — NICOTINE 1 PATCH: 21 PATCH, EXTENDED RELEASE TRANSDERMAL at 08:51

## 2025-06-29 RX ADMIN — BUDESONIDE AND FORMOTEROL FUMARATE DIHYDRATE 2 PUFF: 160; 4.5 AEROSOL RESPIRATORY (INHALATION) at 08:13

## 2025-06-29 RX ADMIN — IPRATROPIUM BROMIDE AND ALBUTEROL SULFATE 3 ML: 2.5; .5 SOLUTION RESPIRATORY (INHALATION) at 08:12

## 2025-06-29 RX ADMIN — FLUTICASONE PROPIONATE 2 SPRAY: 50 SPRAY, METERED NASAL at 08:49

## 2025-06-29 RX ADMIN — BACLOFEN 5 MG: 10 TABLET ORAL at 08:50

## 2025-06-29 NOTE — PROGRESS NOTES
[facility] Medicine Services  PROGRESS NOTE    Patient Name: Oscar Avila  : 1960  MRN: [id number]    Date of Admission: 2025  Primary Care Physician: Concepción Quigley DO    Subjective   Subjective     CC:  Pneumonia    HPI:  Patient with legionnaires disease, doing very well this a.m., remains on 3 L nasal cannula oxygen, no complaints otherwise.  States he feels so much better now, eager to go home.  Likely discharge tomorrow after course of IV antibiotics.  Will transition to p.o. azithromycin      Objective   Objective     Vital Signs:   Temp:  [97 °F (36.1 °C)-98.3 °F (36.8 °C)] 98.3 °F (36.8 °C)  Heart Rate:  [] 90  Resp:  [17-18] 18  BP: (127-157)/(77-93) 127/92  Flow (L/min) (Oxygen Therapy):  [2-6] 4     Physical Exam:  Constitutional: No acute distress, awake, alert  HENT: NCAT, mucous membranes moist  Respiratory: Clear to auscultation bilaterally, respiratory effort normal   Cardiovascular: RRR, no murmurs, rubs, or gallops  Gastrointestinal: Positive bowel sounds, soft, nontender, nondistended  Musculoskeletal: No bilateral ankle edema  Psychiatric: Appropriate affect, cooperative  Neurologic: Oriented x 3, strength symmetric in all extremities, Cranial Nerves grossly intact to confrontation, speech clear  Skin: No rashes     Results Reviewed:  LAB RESULTS:      Lab 25  0859 25  0841 25  0026 25  1924 25  1649 25  1539   WBC 15.42* 17.34* 14.61*  --   --  17.53*   HEMOGLOBIN 12.2* 12.0* 13.1  --   --  13.2   HEMATOCRIT 36.0* 35.8* 40.6  --   --  39.4   PLATELETS 351 300 263  --   --  280   NEUTROS ABS  --   --  12.90*  --   --  15.49*   IMMATURE GRANS (ABS)  --   --  0.07*  --   --  0.09*   LYMPHS ABS  --   --  0.61*  --   --  0.50*   MONOS ABS  --   --  1.00*  --   --  1.43*   EOS ABS  --   --  0.00  --   --  0.00   MCV 99.4* 97.5* 101.8*  --   --  98.0*   CRP  --   --   --   --   --  34.44*   PROCALCITONIN  --    --   --   --   --  0.43*   LACTATE  --   --   --  0.9  --  2.1*   HSTROP T  --   --   --   --  15 14         Lab 06/29/25  0011 06/28/25  1729 06/28/25  0841 06/27/25  2335 06/27/25  1003 06/26/25  0846 06/26/25  0026 06/25/25  1539   SODIUM 137 136 136 133* 132*   < > 131* 128*   POTASSIUM  --   --  4.1  --   --   --  4.5 5.0   CHLORIDE  --   --  104  --   --   --  99 94*   CO2  --   --  23.0  --   --   --  16.1* 20.0*   ANION GAP  --   --  9.0  --   --   --  15.9* 14.0   BUN  --   --  14.5  --   --   --  18.9 23.6*   CREATININE  --   --  0.69*  --   --   --  1.07 1.17   EGFR  --   --  102.7  --   --   --  77.0 69.2   GLUCOSE  --   --  140*  --   --   --  112* 97   CALCIUM  --   --  8.7  --   --   --  8.3* 8.6   MAGNESIUM  --   --  2.2  --   --   --  2.0  --     < > = values in this interval not displayed.         Lab 06/25/25  1539   TOTAL PROTEIN 7.1   ALBUMIN 3.6   GLOBULIN 3.5   ALT (SGPT) 9   AST (SGOT) 21   BILIRUBIN 0.4   ALK PHOS 94   LIPASE 22         Lab 06/25/25  1649 06/25/25  1539   PROBNP  --  483.8   HSTROP T 15 14             Lab 06/27/25  1003   FOLATE 6.52   VITAMIN B 12 568         Lab 06/25/25  2306 06/25/25  1636   PH, ARTERIAL 7.416 7.507*   PCO2, ARTERIAL 31.7* 24.6*   PO2 ART 58.3* 66.4*   FIO2 32 28   HCO3 ART 20.4 19.5*   BASE EXCESS ART -3.4* -2.1*   CARBOXYHEMOGLOBIN 1.1 1.8     Brief Urine Lab Results  (Last result in the past 365 days)        Color   Clarity   Blood   Leuk Est   Nitrite   Protein   CREAT   Urine HCG        06/25/25 2230 Dark Yellow   Clear   Negative   Small (1+)   Negative   30 mg/dL (1+)                   Microbiology Results Abnormal       Procedure Component Value - Date/Time    Legionella Antigen, Urine - Urine, Urine, Clean Catch [[id number]]  (Abnormal) Collected: 06/26/25 1753    Lab Status: Final result Specimen: Urine, Clean Catch Updated: 06/27/25 0628     LEGIONELLA ANTIGEN, URINE Positive            SLP FEES - Fiberoptic Endo Eval Swallow  Result Date:  6/27/2025  This procedure was auto-finalized with no dictation required.      Results for orders placed during the hospital encounter of 06/25/25    Adult Transthoracic Echo Complete W/ Cont if Necessary Per Protocol    Interpretation Summary    Technically difficult study.    Left ventricular ejection fraction appears to be 51 - 55%.    Normal right ventricular wall thickness and systolic function noted. The right ventricular cavity is mildly dilated.    No hemodynamically significant valvular stenosis or regurgitation    The left atrial cavity is moderately dilated.      Current medications:  Scheduled Meds:atorvastatin, 40 mg, Oral, Nightly  azithromycin, 500 mg, Intravenous, Q24H  baclofen, 5 mg, Oral, TID  budesonide-formoterol, 2 puff, Inhalation, BID - RT  cefTRIAXone, 1,000 mg, Intravenous, Q24H  DULoxetine, 60 mg, Oral, Daily  fluticasone, 2 spray, Each Nare, Daily  ipratropium-albuterol, 3 mL, Nebulization, 4x Daily - RT  lisinopril, 10 mg, Oral, Daily  methylPREDNISolone sodium succinate, 40 mg, Intravenous, Daily  metoprolol succinate XL, 25 mg, Oral, Daily  nicotine, 1 patch, Transdermal, Q24H  pantoprazole, 40 mg, Oral, Q AM  prazosin, 2 mg, Oral, Nightly  sodium chloride, 10 mL, Intravenous, Q12H      Continuous Infusions:     PRN Meds:.  acetaminophen    ipratropium-albuterol    ketorolac    sodium chloride    [COMPLETED] Insert Peripheral IV **AND** sodium chloride    sodium chloride    sodium chloride    Assessment & Plan   Assessment & Plan     Active Hospital Problems    Diagnosis  POA    **Acute hypoxic respiratory failure [J96.01]  Yes    Ileus [K56.7]  Yes    Sepsis [A41.9]  Yes    Pneumonia [J18.9]  Yes    Hyponatremia [E87.1]  Yes    Abdominal pain [R10.9]  Yes    Bronchitis [J40]  Yes    COPD (chronic obstructive pulmonary disease) [J44.9]  Yes    Tobacco abuse [Z72.0]  Yes    GERD without esophagitis [K21.9]  Yes    Primary hypertension [I10]  Yes    Hyperlipidemia [E78.5]  Yes       Resolved Hospital Problems   No resolved problems to display.        Brief Hospital Course to date:  Oscar Avila is a 64 y.o. male w/ a hx of COPD, ongoing tobacco use, HTN, HLD who presented to the ED w/ c/o shortness of breath and coughing.      Acute hypoxic respiratory failure - improving  Sepsis 2/2 Pneumonia - resolved  Bronchitis/Hx of COPD   Tobacco use  Leukocytosis - related to steroid  COPD exacerbation - improving  Legionella pneumonia  Legionnaires disease - improved  meets sepsis criteria based on HR 112bpm, temp 102.3, 87% on RA, WBC 17.53, CRP 34.44. procal 0.43, lactic acid 2.1 and + source (PNA per CTA Chest)   noted to be 87% on room air, currently requiring high flow nasal cannula oxygen  Symptomatic from RLL pneumonia and COPD exacerbation evident by increasing oxygen requirements, increased phlegm production and shortness of breath  ABG c/w hypoxia (pH 7.5, CO2 24, O2 66.4)  CTA Chest: Densely consolidating RLL pneumonia, bronchitis, and nonspecific perirenal fat stranding negative for PE  bedside dysphagia screen   Blood cultures NGTD, urine cultures negative, MRSA swab negative, streptococcal urine antigen negative  Legionella urine antigen positive  respiratory panel negative   S/p doxycycline and Rocephin, given the patient is requiring high flow nasal cannula oxygen would categorize this as moderate to severe disease, QTc is 413, will transition patient to azithromycin as first-line agent  Will likely need to be treated for 10 to 14 days, while hospitalized will treat with IV azithromycin transition to p.o. closer to discharge  Source is unclear at this time, have asked family to check his HVAC unit at home  s/p NS 2,430ml given in ED  prn and scheduled nebs   continue Flonase, Advair/Wixela (sub Symbicort)  smoking cessation education   nicotine patch   pt/ot and case mgt consult, recommending inpatient rehab  Echo TTE showed EF of 51 to 55% with no diastolic dysfunction  noted     Hyponatremia - Legionella related - resolved  sodium 127 (136 today), due to legionnaires disease   recheck sodium tomorrow a.m. with BMP  Serum osmolarity 279, urine osmolarity 731, urine sodium 24  s/p 2,430ml NS bolus given in ED  S/p NS @ 75ml/hour x 12 hours (adjust as needed), hold off on additional fluids  no evidence of cirrhosis on CT abdomen with liver enzymes WNL  Echo TTE without heart failure and normal EF  Hyponatremia likely in the setting of Legionella pneumonia, improving with antibiotic coverage     Abdominal pain  Hiccups  likely musculoskeletal in nature; abdominal exam benign, pt c/o pain w/ movement, coughing and w/ hiccups   pt also reported falling on Friday and again Tuesday night- evaluated in ED Tuesday afternoon   CT abdomen pelvis showed densely consolidated RLL pneumonia, nonspecific perirenal fat stranding with probable bland edema, colonic diverticulosis, prominent small bowel loops in lower abdomen and multiple gas-filled small bowel loops suggesting a generalized ileus rather than bowel obstruction and a small hiatal hernia  Patient on steroids for COPD exacerbation, have significantly helped, will add on baclofen as well  S/p evaluation by SLP, s/p FEES, found to have moderate pharyngeal dysphagia  Dietary recommendations soft to chew textures, chopped, honey thick liquids, no mixed consistencies  Will need repeat evaluation by SLP on Monday     HTN  HLD  EKG stable  proBNP and troponin both WNL   continue routine statin   continue Toprol, lisinopril, prazosin w/ hold parameters      GERD  continue PPI    Expected Discharge Location and Transportation: 6/30/2025  Expected Discharge home  Expected Discharge Date: 6/28/2025; Expected Discharge Time:      VTE Prophylaxis:  Mechanical VTE prophylaxis orders are present.    Total time spent: Time Spent: Time Spent: 40 minutes  Time spent includes time reviewing chart, face-to-face time, counseling patient/family/caregiver,  ordering medications/tests/procedures, communicating with other health care professionals, documenting clinical information in the electronic health record, and coordination of care.      AM-PAC 6 Clicks Score (PT): 20 (06/29/25 0800)    CODE STATUS:   Code Status and Medical Interventions: CPR (Attempt to Resuscitate); Full Support   Ordered at: 06/25/25 2015     Code Status (Patient has no pulse and is not breathing):    CPR (Attempt to Resuscitate)     Medical Interventions (Patient has pulse or is breathing):    Full Support       Shantell Martell MD  06/29/25

## 2025-06-30 ENCOUNTER — APPOINTMENT (OUTPATIENT)
Dept: GENERAL RADIOLOGY | Facility: HOSPITAL | Age: 65
End: 2025-06-30
Payer: MEDICARE

## 2025-06-30 DIAGNOSIS — I10 HTN, GOAL BELOW 140/80: ICD-10-CM

## 2025-06-30 LAB
ANION GAP SERPL CALCULATED.3IONS-SCNC: 11.8 MMOL/L (ref 5–15)
BACTERIA SPEC AEROBE CULT: NORMAL
BACTERIA SPEC AEROBE CULT: NORMAL
BUN SERPL-MCNC: 11 MG/DL (ref 8–23)
BUN/CREAT SERPL: 17.2 (ref 7–25)
CALCIUM SPEC-SCNC: 8.2 MG/DL (ref 8.6–10.5)
CHLORIDE SERPL-SCNC: 109 MMOL/L (ref 98–107)
CO2 SERPL-SCNC: 21.2 MMOL/L (ref 22–29)
CREAT SERPL-MCNC: 0.64 MG/DL (ref 0.76–1.27)
DEPRECATED RDW RBC AUTO: 44.8 FL (ref 37–54)
EGFRCR SERPLBLD CKD-EPI 2021: 105.1 ML/MIN/1.73
ERYTHROCYTE [DISTWIDTH] IN BLOOD BY AUTOMATED COUNT: 12.6 % (ref 12.3–15.4)
GLUCOSE SERPL-MCNC: 111 MG/DL (ref 65–99)
HCT VFR BLD AUTO: 34.3 % (ref 37.5–51)
HGB BLD-MCNC: 11.5 G/DL (ref 13–17.7)
MAGNESIUM SERPL-MCNC: 1.9 MG/DL (ref 1.6–2.4)
MCH RBC QN AUTO: 32.7 PG (ref 26.6–33)
MCHC RBC AUTO-ENTMCNC: 33.5 G/DL (ref 31.5–35.7)
MCV RBC AUTO: 97.4 FL (ref 79–97)
PLATELET # BLD AUTO: 389 10*3/MM3 (ref 140–450)
PMV BLD AUTO: 9.4 FL (ref 6–12)
POTASSIUM SERPL-SCNC: 3.5 MMOL/L (ref 3.5–5.2)
RBC # BLD AUTO: 3.52 10*6/MM3 (ref 4.14–5.8)
SODIUM SERPL-SCNC: 139 MMOL/L (ref 136–145)
SODIUM SERPL-SCNC: 141 MMOL/L (ref 136–145)
SODIUM SERPL-SCNC: 142 MMOL/L (ref 136–145)
WBC NRBC COR # BLD AUTO: 15.61 10*3/MM3 (ref 3.4–10.8)

## 2025-06-30 PROCEDURE — 85027 COMPLETE CBC AUTOMATED: CPT

## 2025-06-30 PROCEDURE — 83735 ASSAY OF MAGNESIUM: CPT

## 2025-06-30 PROCEDURE — 94664 DEMO&/EVAL PT USE INHALER: CPT

## 2025-06-30 PROCEDURE — 80048 BASIC METABOLIC PNL TOTAL CA: CPT

## 2025-06-30 PROCEDURE — 92610 EVALUATE SWALLOWING FUNCTION: CPT

## 2025-06-30 PROCEDURE — 94799 UNLISTED PULMONARY SVC/PX: CPT

## 2025-06-30 PROCEDURE — 97535 SELF CARE MNGMENT TRAINING: CPT

## 2025-06-30 PROCEDURE — 97530 THERAPEUTIC ACTIVITIES: CPT

## 2025-06-30 PROCEDURE — 71045 X-RAY EXAM CHEST 1 VIEW: CPT

## 2025-06-30 PROCEDURE — 84295 ASSAY OF SERUM SODIUM: CPT | Performed by: NURSE PRACTITIONER

## 2025-06-30 PROCEDURE — 25010000002 METHYLPREDNISOLONE PER 40 MG

## 2025-06-30 PROCEDURE — 99232 SBSQ HOSP IP/OBS MODERATE 35: CPT

## 2025-06-30 RX ORDER — IPRATROPIUM BROMIDE AND ALBUTEROL SULFATE 2.5; .5 MG/3ML; MG/3ML
3 SOLUTION RESPIRATORY (INHALATION)
Status: DISCONTINUED | OUTPATIENT
Start: 2025-06-30 | End: 2025-07-03 | Stop reason: HOSPADM

## 2025-06-30 RX ORDER — LISINOPRIL 10 MG/1
10 TABLET ORAL DAILY
Qty: 90 TABLET | Refills: 0 | Status: SHIPPED | OUTPATIENT
Start: 2025-06-30

## 2025-06-30 RX ADMIN — PANTOPRAZOLE SODIUM 40 MG: 40 TABLET, DELAYED RELEASE ORAL at 05:33

## 2025-06-30 RX ADMIN — Medication 10 ML: at 09:32

## 2025-06-30 RX ADMIN — IPRATROPIUM BROMIDE AND ALBUTEROL SULFATE 3 ML: 2.5; .5 SOLUTION RESPIRATORY (INHALATION) at 14:35

## 2025-06-30 RX ADMIN — NICOTINE 1 PATCH: 21 PATCH, EXTENDED RELEASE TRANSDERMAL at 09:29

## 2025-06-30 RX ADMIN — LISINOPRIL 10 MG: 10 TABLET ORAL at 09:30

## 2025-06-30 RX ADMIN — ATORVASTATIN CALCIUM 40 MG: 40 TABLET, FILM COATED ORAL at 20:08

## 2025-06-30 RX ADMIN — IPRATROPIUM BROMIDE AND ALBUTEROL SULFATE 3 ML: 2.5; .5 SOLUTION RESPIRATORY (INHALATION) at 10:39

## 2025-06-30 RX ADMIN — BUDESONIDE AND FORMOTEROL FUMARATE DIHYDRATE 2 PUFF: 160; 4.5 AEROSOL RESPIRATORY (INHALATION) at 07:01

## 2025-06-30 RX ADMIN — IPRATROPIUM BROMIDE AND ALBUTEROL SULFATE 3 ML: 2.5; .5 SOLUTION RESPIRATORY (INHALATION) at 02:02

## 2025-06-30 RX ADMIN — BACLOFEN 5 MG: 10 TABLET ORAL at 20:09

## 2025-06-30 RX ADMIN — BUDESONIDE AND FORMOTEROL FUMARATE DIHYDRATE 2 PUFF: 160; 4.5 AEROSOL RESPIRATORY (INHALATION) at 19:55

## 2025-06-30 RX ADMIN — PRAZOSIN HYDROCHLORIDE 2 MG: 1 CAPSULE ORAL at 20:08

## 2025-06-30 RX ADMIN — AZITHROMYCIN DIHYDRATE 500 MG: 250 TABLET ORAL at 09:30

## 2025-06-30 RX ADMIN — IPRATROPIUM BROMIDE AND ALBUTEROL SULFATE 3 ML: 2.5; .5 SOLUTION RESPIRATORY (INHALATION) at 19:55

## 2025-06-30 RX ADMIN — IPRATROPIUM BROMIDE AND ALBUTEROL SULFATE 3 ML: 2.5; .5 SOLUTION RESPIRATORY (INHALATION) at 07:01

## 2025-06-30 RX ADMIN — METHYLPREDNISOLONE SODIUM SUCCINATE 40 MG: 40 INJECTION, POWDER, FOR SOLUTION INTRAMUSCULAR; INTRAVENOUS at 09:29

## 2025-06-30 RX ADMIN — DULOXETINE 60 MG: 60 CAPSULE, DELAYED RELEASE ORAL at 09:30

## 2025-06-30 RX ADMIN — BACLOFEN 5 MG: 10 TABLET ORAL at 15:29

## 2025-06-30 RX ADMIN — Medication 10 ML: at 20:09

## 2025-06-30 RX ADMIN — IPRATROPIUM BROMIDE AND ALBUTEROL SULFATE 3 ML: 2.5; .5 SOLUTION RESPIRATORY (INHALATION) at 23:20

## 2025-06-30 RX ADMIN — BACLOFEN 5 MG: 10 TABLET ORAL at 09:29

## 2025-06-30 RX ADMIN — FLUTICASONE PROPIONATE 2 SPRAY: 50 SPRAY, METERED NASAL at 09:30

## 2025-06-30 RX ADMIN — METOPROLOL SUCCINATE 25 MG: 25 TABLET, EXTENDED RELEASE ORAL at 09:29

## 2025-06-30 NOTE — PLAN OF CARE
Problem: Adult Inpatient Plan of Care  Goal: Plan of Care Review  Outcome: Progressing  Flowsheets (Taken 6/30/2025 0436)  Progress: no change     Problem: Adult Inpatient Plan of Care  Goal: Absence of Hospital-Acquired Illness or Injury  Intervention: Identify and Manage Fall Risk  Recent Flowsheet Documentation  Taken 6/30/2025 0415 by Joy Reynoso RN  Safety Promotion/Fall Prevention:   activity supervised   assistive device/personal items within reach   safety round/check completed  Taken 6/30/2025 0215 by Joy Reynoso RN  Safety Promotion/Fall Prevention:   activity supervised   assistive device/personal items within reach   safety round/check completed  Taken 6/30/2025 0015 by Joy Reynoso RN  Safety Promotion/Fall Prevention:   activity supervised   assistive device/personal items within reach   safety round/check completed  Taken 6/29/2025 2220 by Joy Reynoso RN  Safety Promotion/Fall Prevention:   assistive device/personal items within reach   activity supervised   safety round/check completed  Taken 6/29/2025 2010 by Joy Reynoso RN  Safety Promotion/Fall Prevention:   activity supervised   assistive device/personal items within reach   safety round/check completed     Problem: Adult Inpatient Plan of Care  Goal: Absence of Hospital-Acquired Illness or Injury  Intervention: Prevent Skin Injury  Recent Flowsheet Documentation  Taken 6/30/2025 0415 by Joy Reynoso RN  Skin Protection: incontinence pads utilized  Taken 6/30/2025 0215 by Joy Reynoso RN  Body Position: position changed independently  Skin Protection: incontinence pads utilized  Taken 6/30/2025 0015 by Joy Reynoso RN  Body Position: position changed independently  Skin Protection: incontinence pads utilized  Taken 6/29/2025 2220 by Joy Reynoso RN  Body Position: position changed independently  Skin Protection: incontinence pads utilized  Taken 6/29/2025 2010 by Joy Reynoso RN  Body Position: position changed independently  Skin  Protection: incontinence pads utilized     Problem: Sepsis/Septic Shock  Goal: Absence of Infection Signs and Symptoms  Intervention: Promote Recovery  Recent Flowsheet Documentation  Taken 6/30/2025 0415 by Joy Reynoso RN  Activity Management: activity encouraged  Taken 6/30/2025 0215 by Joy Reynoso RN  Activity Management: activity encouraged  Taken 6/30/2025 0015 by Joy Reynoso RN  Activity Management: activity encouraged  Taken 6/29/2025 2220 by Joy Reynoso RN  Activity Management: activity encouraged  Taken 6/29/2025 2010 by Joy Reynoso RN  Activity Management: activity encouraged     Problem: Fall Injury Risk  Goal: Absence of Fall and Fall-Related Injury  Intervention: Promote Injury-Free Environment  Recent Flowsheet Documentation  Taken 6/30/2025 0415 by Joy Reynoso RN  Safety Promotion/Fall Prevention:   activity supervised   assistive device/personal items within reach   safety round/check completed  Taken 6/30/2025 0215 by Joy Reynoso RN  Safety Promotion/Fall Prevention:   activity supervised   assistive device/personal items within reach   safety round/check completed  Taken 6/30/2025 0015 by Joy Reynoso RN  Safety Promotion/Fall Prevention:   activity supervised   assistive device/personal items within reach   safety round/check completed  Taken 6/29/2025 2220 by Joy Reynoso RN  Safety Promotion/Fall Prevention:   assistive device/personal items within reach   activity supervised   safety round/check completed  Taken 6/29/2025 2010 by Joy Reynoso RN  Safety Promotion/Fall Prevention:   activity supervised   assistive device/personal items within reach   safety round/check completed   Goal Outcome Evaluation:           Progress: no change

## 2025-06-30 NOTE — PLAN OF CARE
Goal Outcome Evaluation:           Progress: no change  Outcome Evaluation: Pt continues to present below functional baseline secondary to generalized weakness, decreased endurance, and increased O2 needs. Pt stood at sink to complete grooming tasks with SBA, SPO2 dropped to 88%, however, returned to >90% within 30 seconds of rest. Pt was also able to ambulate 10 ft and MIP x 1 minute with SBA, SPO2 dropped to 86% while MIP and returned to >90% within one minute of rest and PLB. Pt would continue to benefit from skilled IPOT services. Continue current OT POC.    Anticipated Discharge Disposition (OT): inpatient rehabilitation facility

## 2025-06-30 NOTE — PROGRESS NOTES
Enter Query Response Below      Query Response: Acute hypoxic respiratory failure unrelated to sepsis              If applicable, please update the problem list.

## 2025-06-30 NOTE — THERAPY RE-EVALUATION
Acute Care - Speech Language Pathology   Swallow Re-Evaluation [facility]  Clinical Swallow Evaluation     Patient Name: Oscar Avila  : 1960  MRN: [id number]  Today's Date: 2025               Admit Date: 2025    Visit Dx:     ICD-10-CM ICD-9-CM   1. SOB (shortness of breath)  R06.02 786.05   2. Hypoxia  R09.02 799.02   3. Pneumonia of both lower lobes due to infectious organism  J18.9 486   4. Leukocytosis, unspecified type  D72.829 288.60   5. Hyponatremia  E87.1 276.1   6. Pharyngeal dysphagia  R13.13 787.23     Patient Active Problem List   Diagnosis    COPD (chronic obstructive pulmonary disease)    Hyperlipidemia    Coronary artery calcification    Emphysema, unspecified    Current smoker    Alcoholism in recovery    Primary hypertension    Polyp of colon    ANGELA (obstructive sleep apnea)    Acute hypoxic respiratory failure    Sepsis    Pneumonia    Hyponatremia    Abdominal pain    Bronchitis    COPD (chronic obstructive pulmonary disease)    Tobacco abuse    GERD without esophagitis    Ileus     Past Medical History:   Diagnosis Date    Alcohol use     Hyperlipidemia     Hypertension     Neuromuscular disorder      Past Surgical History:   Procedure Laterality Date    CARPAL TUNNEL RELEASE Left     CARPAL TUNNEL RELEASE Right 2023    Dr. Esa Acosta    FOOT FRACTURE SURGERY Right 2018    fracture to right foot - has a metal pin on great toe    WISDOM TOOTH EXTRACTION         SLP Recommendation and Plan  SLP Swallowing Diagnosis: R/O pharyngeal dysphagia (25 1330)  SLP Diet Recommendation: soft to chew textures, chopped, no mixed consistencies, honey thick liquids, water between meals after oral care, with supervision, other (see comments) (one small cup water every 2 hours, as tolerated, between meals.) (25 1330)  Recommended Precautions and Strategies: general aspiration precautions (25 1330)  SLP Rec. for Method of Medication Administration:  meds whole, with puree (06/30/25 1330)     Monitor for Signs of Aspiration: yes, notify SLP if any concerns (06/30/25 1330)  Recommended Diagnostics: FEES (tomorrow, 7/1) (06/30/25 1330)     Anticipated Discharge Disposition (SLP): inpatient rehabilitation facility (06/30/25 1330)           Oral Care Recommendations: Oral Care BID/PRN, Toothbrush (06/30/25 1330)                                               SWALLOW EVALUATION (Last 72 Hours)       SLP Adult Swallow Evaluation       Row Name 06/30/25 1330                   Rehab Evaluation    Document Type re-evaluation  -        Subjective Information no complaints  -        Patient Observations alert;cooperative  -        Patient Effort good  -SM           General Information    Pertinent History Of Current Problem RN contacted SLP. MD requesting re-eval for ? readiness to advance off honey-thick liquids.  -        Current Method of Nutrition soft to chew textures;chopped;honey-thick liquids  -        Prior Level of Function-Swallowing safe, efficient swallowing in all situations  -        Plans/Goals Discussed with patient;agreed upon  -        Barriers to Rehab none identified  -        Patient's Goals for Discharge return to regular diet  -           Pain    Pretreatment Pain Rating 0/10 - no pain  -        Posttreatment Pain Rating 0/10 - no pain  -           Clinical Swallow Eval    Pharyngeal Phase suspected pharyngeal impairment  -        Clinical Swallow Evaluation Summary No overt signs aspiration though known degree silent aspiration per last FEES. Pt in agreement for below recommendations.  -           SLP Evaluation Clinical Impression    SLP Swallowing Diagnosis R/O pharyngeal dysphagia  -        Functional Impact risk of aspiration/pneumonia  -           Recommendations    SLP Diet Recommendation soft to chew textures;chopped;no mixed consistencies;honey thick liquids;water between meals after oral care, with  supervision;other (see comments)  one small cup water every 2 hours, as tolerated, between meals.  -        Recommended Diagnostics FEES  tomorrow, 7/1  -        Recommended Precautions and Strategies general aspiration precautions  -        Oral Care Recommendations Oral Care BID/PRN;Toothbrush  -        SLP Rec. for Method of Medication Administration meds whole;with puree  -        Monitor for Signs of Aspiration yes;notify SLP if any concerns  -        Anticipated Discharge Disposition (SLP) inpatient rehabilitation facility  -                  User Key  (r) = Recorded By, (t) = Taken By, (c) = Cosigned By      Initials Name Effective Dates    [facility]lin House MS CCC-SLP 01/20/25 -                     EDUCATION  The patient has been educated in the following areas:   Dysphagia (Swallowing Impairment) Oral Care/Hydration Modified Diet Instruction.                Time Calculation:    Time Calculation- SLP       Row Name 06/30/25 1555             Time Calculation- [facility]    SLP Start Time 1530  -      SLP Received On 06/30/25  -         Untimed Charges    23957-KF Eval Oral Pharyng Swallow Minutes 25  -SM         Total Minutes    Untimed Charges Total Minutes 25  -SM       Total Minutes 25  -SM                User Key  (r) = Recorded By, (t) = Taken By, (c) = Cosigned By      Initials Name Provider Type    [facility] MS CCC-SLP Speech and Language Pathologist                    Therapy Charges for Today       Code Description Service Date Service Provider Modifiers Qty    63836415969 HC ST EVAL ORAL PHARYNG SWALLOW 2 6/30/2025 Daylin Donaldson MS CCC-SLP GN 1                 Daylin Donaldson MS CCC-SLP  6/30/2025

## 2025-06-30 NOTE — PLAN OF CARE
Goal Outcome Evaluation:  Plan of Care Reviewed With: patient                Anticipated Discharge Disposition (SLP): inpatient rehabilitation facility          SLP Swallowing Diagnosis: R/O pharyngeal dysphagia (06/30/25 1330)           Repeat FEES to follow tomorrow, 7/1.

## 2025-06-30 NOTE — THERAPY TREATMENT NOTE
Patient Name: Oscar Avila  : 1960    MRN: [id number]                              Today's Date: 2025       Admit Date: 2025    Visit Dx:     ICD-10-CM ICD-9-CM   1. SOB (shortness of breath)  R06.02 786.05   2. Hypoxia  R09.02 799.02   3. Pneumonia of both lower lobes due to infectious organism  J18.9 486   4. Leukocytosis, unspecified type  D72.829 288.60   5. Hyponatremia  E87.1 276.1   6. Pharyngeal dysphagia  R13.13 787.23     Patient Active Problem List   Diagnosis    COPD (chronic obstructive pulmonary disease)    Hyperlipidemia    Coronary artery calcification    Emphysema, unspecified    Current smoker    Alcoholism in recovery    Primary hypertension    Polyp of colon    ANGELA (obstructive sleep apnea)    Acute hypoxic respiratory failure    Sepsis    Pneumonia    Hyponatremia    Abdominal pain    Bronchitis    COPD (chronic obstructive pulmonary disease)    Tobacco abuse    GERD without esophagitis    Ileus     Past Medical History:   Diagnosis Date    Alcohol use     Hyperlipidemia     Hypertension     Neuromuscular disorder      Past Surgical History:   Procedure Laterality Date    CARPAL TUNNEL RELEASE Left     CARPAL TUNNEL RELEASE Right 2023    Dr. Esa Acosta    FOOT FRACTURE SURGERY Right 2018    fracture to right foot - has a metal pin on great toe    WISDOM TOOTH EXTRACTION        General Information       Row Name 25 1345          OT Time and Intention    Document Type therapy note (daily note)  -SA     Mode of Treatment occupational therapy  -SA       Row Name 25 1341          General Information    Patient Profile Reviewed yes  -SA     Existing Precautions/Restrictions fall;oxygen therapy device and L/min  6L O2 via nasal cannula  -SA     Barriers to Rehab medically complex  -SA       Row Name 25 1348          Cognition    Orientation Status (Cognition) oriented x 3  -SA       Row Name 25 1348          Safety Issues/Impairments  Affecting Functional Mobility    Safety Issues Affecting Function (Mobility) awareness of need for assistance;insight into deficits/self-awareness;safety precaution awareness;safety precautions follow-through/compliance  -     Impairments Affecting Function (Mobility) balance;endurance/activity tolerance;shortness of breath;strength  -               User Key  (r) = Recorded By, (t) = Taken By, (c) = Cosigned By      Initials Name Provider Type     Amanda Rees OT Occupational Therapist                     Mobility/ADL's       Row Name 06/30/25 [facility]          Bed Mobility    Bed Mobility supine-sit  -SA     Scooting/Bridging [place] (Bed Mobility) independent  -     Assistive Device (Bed Mobility) head of bed elevated  -     Comment, (Bed Mobility) Denied dizziness at EOB; SPO2 remained 90% or above with bed moblity  -       Row Name 06/30/25 [facility]          Transfers    Transfers sit-stand transfer;bed-chair transfer  -       Row Name 06/30/25 [facility]          Bed-Chair Transfer    Bed-Chair [place] (Transfers) standby assist;verbal cues  -       Row Name 06/30/25 [facility]          Sit-Stand Transfer    Sit-Stand [place] (Transfers) standby assist;verbal cues  -     Comment, (Sit-Stand Transfer) STS x5 with SBA from EOB; denied dizziness, however, SPO2 dropped to 88% with STS transfers  -       Row Name 06/30/25 [facility]          Functional Mobility    Functional Mobility- Ind. Level verbal cues required;standby assist  -     Functional Mobility-Distance (Feet) 10  -SA     Functional Mobility- Safety Issues supplemental O2  -     Functional Mobility- Comment Pt ambulated 10ft with SBA, no AD, SPO2 dropped to 86% with mobility. Pt sat EOB and SPO2 returned to >90% within 30 seconds of rest  -     Patient was able to Ambulate yes  -       Row Name 06/30/25 [facility]          Activities of Daily Living    BADL Assessment/Intervention grooming  -       Row Name 06/30/25 [facility]           Grooming Assessment/Training    [place] Level (Grooming) hair care, combing/brushing  -     Position (Grooming) sink side  -     Comment, (Grooming) Pt stood sink side with SBA to complete grooming tasks, SPO2 dropped to 88% while standing at sink  -               User Key  (r) = Recorded By, (t) = Taken By, (c) = Cosigned By      Initials Name Provider Type     Amanda Rees OT Occupational Therapist                   Obj/Interventions       Row Name 06/30/25 [facility]          Balance    Balance Assessment sitting static balance;sitting dynamic balance;sit to stand dynamic balance;standing static balance;standing dynamic balance  -     Static Sitting Balance independent  -SA     Dynamic Sitting Balance standby assist  -SA     Position, Sitting Balance unsupported;sitting edge of bed  -SA     Static Standing Balance standby assist  -SA     Dynamic Standing Balance standby assist  -SA     Position/Device Used, Standing Balance unsupported  -SA     Balance Interventions sitting;standing;sit to stand;supported;static;dynamic;minimal challenge;occupation based/functional task  -     Comment, Balance No LOB with static and dynamic standing  -               User Key  (r) = Recorded By, (t) = Taken By, (c) = Cosigned By      Initials Name Provider Type     Amanda Rees OT Occupational Therapist                   Goals/Plan    No documentation.                  Clinical Impression       Row Name 06/30/25 [facility]          Pain Assessment    Pretreatment Pain Rating 0/10 - no pain  -     Posttreatment Pain Rating 0/10 - no pain  -SA       Row Name 06/30/25 [facility]          Plan of Care Review    Progress no change  -     Outcome Evaluation Pt continues to present below functional baseline secondary to generalized weakness, decreased endurance, and increased O2 needs. Pt stood at sink to complete grooming tasks with SBA, SPO2 dropped to 88%, however, returned to >90% within 30 seconds of rest. Pt  was also able to ambulate 10 ft and MIP x 1 minute with SBA, SPO2 dropped to 86% while MIP and returned to >90% within one minute of rest and PLB. Pt would continue to benefit from skilled IPOT services. Continue current OT POC.  -       Row Name 06/30/25 1352          Therapy Plan Review/Discharge Plan (OT)    Anticipated Discharge Disposition (OT) inpatient rehabilitation facility  -       Row Name 06/30/25 1352          Vital Signs    Pre Systolic BP Rehab 140  -SA     Pre Treatment Diastolic BP 81  -SA     Post Systolic BP Rehab 160  -SA     Post Treatment Diastolic BP 85  -SA     Pretreatment Heart Rate (beats/min) 89  -SA     Intratreatment Heart Rate (beats/min) 101  -SA     Posttreatment Heart Rate (beats/min) 85  -SA     Pre SpO2 (%) 88  -SA     O2 Delivery Pre Treatment nasal cannula  -SA     Intra SpO2 (%) 86  -SA     O2 Delivery Intra Treatment nasal cannula  -SA     Post SpO2 (%) 90  -SA     O2 Delivery Post Treatment nasal cannula  -SA     Pre Patient Position Supine  -SA     Intra Patient Position Sitting  -SA     Post Patient Position Sitting  -SA       Row Name 06/30/25 7260          Positioning and Restraints    Pre-Treatment Position in bed  -SA     Post Treatment Position chair  -SA     In Chair notified nsg;reclined;sitting;call light within reach;encouraged to call for assist;exit alarm on;waffle cushion;legs elevated  -SA               User Key  (r) = Recorded By, (t) = Taken By, (c) = Cosigned By      Initials Name Provider Type    SA Amanda Rees, OT Occupational Therapist                   Outcome Measures       Row Name 06/30/25 8470          How much help from another is currently needed...    Putting on and taking off regular lower body clothing? 3  -SA     Bathing (including washing, rinsing, and drying) 3  -SA     Toileting (which includes using toilet bed pan or urinal) 3  -SA     Putting on and taking off regular upper body clothing 3  -SA     Taking care of personal  grooming (such as brushing teeth) 3  -SA     Eating meals 4  -SA     AM-PAC 6 Clicks Score (OT) 19  -SA       Row Name 06/30/25 0800          How much help from another person do you currently need...    Turning from your back to your side while in flat bed without using bedrails? 4  -VS     Moving from lying on back to sitting on the side of a flat bed without bedrails? 3  -VS     Moving to and from a bed to a chair (including a wheelchair)? 3  -VS     Standing up from a chair using your arms (e.g., wheelchair, bedside chair)? 4  -VS     Climbing 3-5 steps with a railing? 3  -VS     To walk in hospital room? 3  -VS     AM-PAC 6 Clicks Score (PT) 20  -VS     Highest Level of Mobility Goal Walk 10 Steps or More-6  -VS       Row Name 06/30/25 1357          Functional Assessment    Outcome Measure Options AM-[facility] 6 Clicks Daily Activity (OT)  -               User Key  (r) = Recorded By, (t) = Taken By, (c) = Cosigned By      Initials Name Provider Type    VS Neida Ross, RN Registered Nurse     Amanda Rees OT Occupational Therapist                    Occupational Therapy Education       Title: PT OT SLP Therapies (In Progress)       Topic: Occupational Therapy (In Progress)       Point: ADL training (In Progress)       Learning Progress Summary            Patient Acceptance, E, NR by  at 6/30/2025 1358                      Point: Body mechanics (In Progress)       Learning Progress Summary            Patient Acceptance, E, NR by  at 6/30/2025 1358                                      User Key       Initials Effective Dates Name Provider Type Discipline     04/16/25 -  Amanda Rees OT Occupational Therapist OT                  OT Recommendation and Plan     Plan of Care Review  Progress: no change  Outcome Evaluation: Pt continues to present below functional baseline secondary to generalized weakness, decreased endurance, and increased O2 needs. Pt stood at sink to complete grooming tasks  with SBA, SPO2 dropped to 88%, however, returned to >90% within 30 seconds of rest. Pt was also able to ambulate 10 ft and MIP x 1 minute with SBA, SPO2 dropped to 86% while MIP and returned to >90% within one minute of rest and PLB. Pt would continue to benefit from skilled IPOT services. Continue current OT POC.     Time Calculation:         Time Calculation- OT       Row Name 06/30/25 1358             Time Calculation- OT    OT Start Time 1300  -SA      OT Received On 06/30/25  -SA      OT Goal Re-Cert Due Date 07/06/25  -SA         Timed Charges    76688 - OT Therapeutic Activity Minutes 30  -SA      03818 - OT Self Care/Mgmt Minutes 9  -SA         Total Minutes    Timed Charges Total Minutes 39  -SA       Total Minutes 39  -SA                User Key  (r) = Recorded By, (t) = Taken By, (c) = Cosigned By      Initials Name Provider Type    SA Amanda Rees OT Occupational Therapist                  Therapy Charges for Today       Code Description Service Date Service Provider Modifiers Qty    72044620850  OT THERAPEUTIC ACT EA 15 MIN 6/30/2025 Amanda Rees OT GO 2    01746997387 HC OT SELF CARE/MGMT/TRAIN EA 15 MIN 6/30/2025 Amanda Rees OT GO 1                 Amanda Rees OT  6/30/2025

## 2025-06-30 NOTE — CASE MANAGEMENT/SOCIAL WORK
Continued Stay Note   [place]     Patient Name: Oscar Avila  MRN: [id number]  Today's Date: 6/30/2025    Admit Date: 6/25/2025    Plan: update   Discharge Plan       Row Name 06/30/25 0951       Plan    Plan update    Patient/Family in Agreement with Plan yes    Plan Comments Spoke with patient at bedside regarding discharge plan.  Patient reports he thought he would be going home today after being on Room Air and doing better but now is not sure, currently on [id number].  No new discharge needs verbalized.  CM following.  Patient plan is to discharge home via car with family to transport.    Final Discharge Disposition Code 01 - home or self-care                   Discharge Codes    No documentation.                 Expected Discharge Date and Time       Expected Discharge Date Expected Discharge Time    Jun 28, 2025               Roxie Arnold RN

## 2025-06-30 NOTE — PROGRESS NOTES
[facility] Medicine Services  PROGRESS NOTE    Patient Name: Oscar Avila  : 1960  MRN: [id number]    Date of Admission: 2025  Primary Care Physician: Concepción Quigley DO    Subjective   Subjective     CC:  Pneumonia    HPI:  No significant overnight events, patient oxygen requirements did go up, currently on 6 L satting at 90%.  Overall states he feels much better however he may have coughed significantly overnight, may have some mucous plugging, have handed him a flutter valve asked him use this 10 times every hour.      Objective   Objective     Vital Signs:   Temp:  [97.9 °F (36.6 °C)-98.7 °F (37.1 °C)] 98.1 °F (36.7 °C)  Heart Rate:  [] 78  Resp:  [18-22] 22  BP: (119-154)/(76-95) 119/76  Flow (L/min) (Oxygen Therapy):  [2-45] 6     Physical Exam:  Constitutional: No acute distress, awake, alert  HENT: NCAT, mucous membranes moist  Respiratory: Clear to auscultation bilaterally, respiratory effort normal   Cardiovascular: RRR, no murmurs, rubs, or gallops  Gastrointestinal: Positive bowel sounds, soft, nontender, nondistended  Musculoskeletal: No bilateral ankle edema  Psychiatric: Appropriate affect, cooperative  Neurologic: Oriented x 3, strength symmetric in all extremities, Cranial Nerves grossly intact to confrontation, speech clear  Skin: No rashes     Results Reviewed:  LAB RESULTS:      Lab 25  0920 25  0859 25  0841 25  0026 25  1924 25  1649 25  1539   WBC 15.61* 15.42* 17.34* 14.61*  --   --  17.53*   HEMOGLOBIN 11.5* 12.2* 12.0* 13.1  --   --  13.2   HEMATOCRIT 34.3* 36.0* 35.8* 40.6  --   --  39.4   PLATELETS 389 351 300 263  --   --  280   NEUTROS ABS  --   --   --  12.90*  --   --  15.49*   IMMATURE GRANS (ABS)  --   --   --  0.07*  --   --  0.09*   LYMPHS ABS  --   --   --  0.61*  --   --  0.50*   MONOS ABS  --   --   --  1.00*  --   --  1.43*   EOS ABS  --   --   --  0.00  --   --  0.00   MCV 97.4*  99.4* 97.5* 101.8*  --   --  98.0*   CRP  --   --   --   --   --   --  34.44*   PROCALCITONIN  --   --   --   --   --   --  0.43*   LACTATE  --   --   --   --  0.9  --  2.1*   HSTROP T  --   --   --   --   --  15 14         Lab 06/29/25  2339 06/29/25  1650 06/29/25  0859 06/29/25  0011 06/28/25  1729 06/28/25  0841 06/26/25  0846 06/26/25  0026 06/25/25  1539   SODIUM 141 141 141 137 136 136   < > 131* 128*   POTASSIUM  --   --  3.9  --   --  4.1  --  4.5 5.0   CHLORIDE  --   --  108*  --   --  104  --  99 94*   CO2  --   --  21.7*  --   --  23.0  --  16.1* 20.0*   ANION GAP  --   --  11.3  --   --  9.0  --  15.9* 14.0   BUN  --   --  11.5  --   --  14.5  --  18.9 23.6*   CREATININE  --   --  0.67*  --   --  0.69*  --  1.07 1.17   EGFR  --   --  103.6  --   --  102.7  --  77.0 69.2   GLUCOSE  --   --  100*  --   --  140*  --  112* 97   CALCIUM  --   --  8.4*  --   --  8.7  --  8.3* 8.6   MAGNESIUM  --   --  2.0  --   --  2.2  --  2.0  --     < > = values in this interval not displayed.         Lab 06/25/25  1539   TOTAL PROTEIN 7.1   ALBUMIN 3.6   GLOBULIN 3.5   ALT (SGPT) 9   AST (SGOT) 21   BILIRUBIN 0.4   ALK PHOS 94   LIPASE 22         Lab 06/25/25  1649 06/25/25  1539   PROBNP  --  483.8   HSTROP T 15 14             Lab 06/27/25  1003   FOLATE 6.52   VITAMIN B 12 568         Lab 06/25/25  2306 06/25/25  1636   PH, ARTERIAL 7.416 7.507*   PCO2, ARTERIAL 31.7* 24.6*   PO2 ART 58.3* 66.4*   FIO2 32 28   HCO3 ART 20.4 19.5*   BASE EXCESS ART -3.4* -2.1*   CARBOXYHEMOGLOBIN 1.1 1.8     Brief Urine Lab Results  (Last result in the past 365 days)        Color   Clarity   Blood   Leuk Est   Nitrite   Protein   CREAT   Urine HCG        06/25/25 2230 Dark Yellow   Clear   Negative   Small (1+)   Negative   30 mg/dL (1+)                   Microbiology Results Abnormal       Procedure Component Value - Date/Time    Legionella Antigen, Urine - Urine, Urine, Clean Catch [[id number]]  (Abnormal) Collected: 06/26/25 7080     Lab Status: Final result Specimen: Urine, Clean Catch Updated: 06/27/25 0628     LEGIONELLA ANTIGEN, URINE Positive            XR Chest 1 View  Result Date: 6/30/2025  XR CHEST 1 VW Date of Exam: 6/30/2025 2:57 AM EDT Indication: hypoxia Comparison: 6/25/2025. Findings: There is no pneumothorax, pleural effusion or focal airspace consolidation. Heart size and pulmonary vasculature appear within normal limits. Regional bones appear intact.     Impression: Impression: No acute cardiopulmonary abnormality. Electronically Signed: Felton Jaime MD  6/30/2025 4:32 AM EDT  Workstation ID: [id number]      Results for orders placed during the hospital encounter of 06/25/25    Adult Transthoracic Echo Complete W/ Cont if Necessary Per Protocol    Interpretation Summary    Technically difficult study.    Left ventricular ejection fraction appears to be 51 - 55%.    Normal right ventricular wall thickness and systolic function noted. The right ventricular cavity is mildly dilated.    No hemodynamically significant valvular stenosis or regurgitation    The left atrial cavity is moderately dilated.      Current medications:  Scheduled Meds:atorvastatin, 40 mg, Oral, Nightly  azithromycin, 500 mg, Oral, Q24H  baclofen, 5 mg, Oral, TID  budesonide-formoterol, 2 puff, Inhalation, BID - RT  DULoxetine, 60 mg, Oral, Daily  fluticasone, 2 spray, Each Nare, Daily  ipratropium-albuterol, 3 mL, Nebulization, Q4H - RT  lisinopril, 10 mg, Oral, Daily  metoprolol succinate XL, 25 mg, Oral, Daily  nicotine, 1 patch, Transdermal, Q24H  pantoprazole, 40 mg, Oral, Q AM  prazosin, 2 mg, Oral, Nightly  sodium chloride, 10 mL, Intravenous, Q12H      Continuous Infusions:     PRN Meds:.  acetaminophen    ipratropium-albuterol    ketorolac    sodium chloride    [COMPLETED] Insert Peripheral IV **AND** sodium chloride    sodium chloride    sodium chloride    Assessment & Plan   Assessment & Plan     Active Hospital Problems    Diagnosis  POA    **Acute  hypoxic respiratory failure [J96.01]  Yes    Ileus [K56.7]  Yes    Sepsis [A41.9]  Yes    Pneumonia [J18.9]  Yes    Hyponatremia [E87.1]  Yes    Abdominal pain [R10.9]  Yes    Bronchitis [J40]  Yes    COPD (chronic obstructive pulmonary disease) [J44.9]  Yes    Tobacco abuse [Z72.0]  Yes    GERD without esophagitis [K21.9]  Yes    Primary hypertension [I10]  Yes    Hyperlipidemia [E78.5]  Yes      Resolved Hospital Problems   No resolved problems to display.        Brief Hospital Course to date:  Oscar Avila is a 64 y.o. male w/ a hx of COPD, ongoing tobacco use, HTN, HLD who presented to the ED w/ c/o shortness of breath and coughing.      Acute hypoxic respiratory failure - improving  Sepsis 2/2 Pneumonia - resolved  Bronchitis/Hx of COPD   Tobacco use  Leukocytosis - related to steroid  COPD exacerbation - improving  Legionella pneumonia  Legionnaires disease - improved  meets sepsis criteria based on HR 112bpm, temp 102.3, 87% on RA, WBC 17.53, CRP 34.44. procal 0.43, lactic acid 2.1 and + source (PNA per CTA Chest)   noted to be 87% on room air, currently requiring high flow nasal cannula oxygen  Symptomatic from RLL pneumonia and COPD exacerbation evident by increasing oxygen requirements, increased phlegm production and shortness of breath  ABG c/w hypoxia (pH 7.5, CO2 24, O2 66.4)  CTA Chest: Densely consolidating RLL pneumonia, bronchitis, and nonspecific perirenal fat stranding negative for PE  bedside dysphagia screen   Blood cultures NGTD, urine cultures negative, MRSA swab negative, streptococcal urine antigen negative  Legionella urine antigen positive  respiratory panel negative   S/p doxycycline and Rocephin, given the patient is requiring high flow nasal cannula oxygen would categorize this as moderate to severe disease, QTc is 413, will transition patient to azithromycin as first-line agent  S/p 4 days of IV azithromycin, transition to p.o.  Will likely need to be treated for 10 to 14  days,  Source is unclear at this time, have asked family to check his HVAC unit at home  s/p NS 2,430ml given in ED  prn and scheduled nebs   continue Flonase, Advair/Wixela (sub Symbicort)  smoking cessation education   nicotine patch   pt/ot and case mgt consult, recommending inpatient rehab  Echo TTE showed EF of 51 to 55% with no diastolic dysfunction noted  Back to higher oxygen requirements requiring 6 L, may need mucolytics and flutter valve     Hyponatremia - Legionella related - resolved  sodium 127 (136 today), due to legionnaires disease   recheck sodium tomorrow a.m. with BMP  Serum osmolarity 279, urine osmolarity 731, urine sodium 24  s/p 2,430ml NS bolus given in ED  S/p NS @ 75ml/hour x 12 hours (adjust as needed), hold off on additional fluids  no evidence of cirrhosis on CT abdomen with liver enzymes WNL  Echo TTE without heart failure and normal EF  Hyponatremia likely in the setting of Legionella pneumonia, improving with antibiotic coverage     Abdominal pain  Hiccups  likely musculoskeletal in nature; abdominal exam benign, pt c/o pain w/ movement, coughing and w/ hiccups   pt also reported falling on Friday and again Tuesday night- evaluated in ED Tuesday afternoon   CT abdomen pelvis showed densely consolidated RLL pneumonia, nonspecific perirenal fat stranding with probable bland edema, colonic diverticulosis, prominent small bowel loops in lower abdomen and multiple gas-filled small bowel loops suggesting a generalized ileus rather than bowel obstruction and a small hiatal hernia  Patient on steroids for COPD exacerbation, have significantly helped, will add on baclofen as well  S/p evaluation by SLP, s/p FEES, found to have moderate pharyngeal dysphagia  Dietary recommendations soft to chew textures, chopped, honey thick liquids, no mixed consistencies  Will need repeat evaluation by SLP on Monday     HTN  HLD  EKG stable  proBNP and troponin both WNL   continue routine statin   continue  Toprol, lisinopril, prazosin w/ hold parameters      GERD  continue PPI    Expected Discharge Location and Transportation: 6/30/2025  Expected Discharge home  Expected Discharge Date: 6/28/2025; Expected Discharge Time:      VTE Prophylaxis:  Mechanical VTE prophylaxis orders are present.    Total time spent: Time Spent: Time Spent: 40 minutes  Time spent includes time reviewing chart, face-to-face time, counseling patient/family/caregiver, ordering medications/tests/procedures, communicating with other health care professionals, documenting clinical information in the electronic health record, and coordination of care.      AM-PAC 6 Clicks Score (PT): 20 (06/30/25 0800)    CODE STATUS:   Code Status and Medical Interventions: CPR (Attempt to Resuscitate); Full Support   Ordered at: 06/25/25 2015     Code Status (Patient has no pulse and is not breathing):    CPR (Attempt to Resuscitate)     Medical Interventions (Patient has pulse or is breathing):    Full Support       Shantell Martell MD  06/30/25

## 2025-07-01 ENCOUNTER — APPOINTMENT (OUTPATIENT)
Dept: GENERAL RADIOLOGY | Facility: HOSPITAL | Age: 65
End: 2025-07-01
Payer: MEDICARE

## 2025-07-01 ENCOUNTER — ANCILLARY PROCEDURE (OUTPATIENT)
Dept: SPEECH THERAPY | Facility: HOSPITAL | Age: 65
End: 2025-07-01
Payer: MEDICARE

## 2025-07-01 LAB
ANION GAP SERPL CALCULATED.3IONS-SCNC: 9 MMOL/L (ref 5–15)
BUN SERPL-MCNC: 11.3 MG/DL (ref 8–23)
BUN/CREAT SERPL: 15.7 (ref 7–25)
CALCIUM SPEC-SCNC: 8.7 MG/DL (ref 8.6–10.5)
CHLORIDE SERPL-SCNC: 104 MMOL/L (ref 98–107)
CO2 SERPL-SCNC: 26 MMOL/L (ref 22–29)
CREAT SERPL-MCNC: 0.72 MG/DL (ref 0.76–1.27)
CRP SERPL-MCNC: 0.85 MG/DL (ref 0–0.5)
DEPRECATED RDW RBC AUTO: 45.9 FL (ref 37–54)
EGFRCR SERPLBLD CKD-EPI 2021: 101.4 ML/MIN/1.73
ERYTHROCYTE [DISTWIDTH] IN BLOOD BY AUTOMATED COUNT: 12.6 % (ref 12.3–15.4)
GLUCOSE SERPL-MCNC: 80 MG/DL (ref 65–99)
HCT VFR BLD AUTO: 37.3 % (ref 37.5–51)
HGB BLD-MCNC: 12.4 G/DL (ref 13–17.7)
MAGNESIUM SERPL-MCNC: 2.2 MG/DL (ref 1.6–2.4)
MCH RBC QN AUTO: 33.1 PG (ref 26.6–33)
MCHC RBC AUTO-ENTMCNC: 33.2 G/DL (ref 31.5–35.7)
MCV RBC AUTO: 99.5 FL (ref 79–97)
NT-PROBNP SERPL-MCNC: 306.4 PG/ML (ref 0–900)
PLATELET # BLD AUTO: 436 10*3/MM3 (ref 140–450)
PMV BLD AUTO: 9.1 FL (ref 6–12)
POTASSIUM SERPL-SCNC: 4 MMOL/L (ref 3.5–5.2)
PROCALCITONIN SERPL-MCNC: 0.11 NG/ML (ref 0–0.25)
RBC # BLD AUTO: 3.75 10*6/MM3 (ref 4.14–5.8)
SODIUM SERPL-SCNC: 139 MMOL/L (ref 136–145)
WBC NRBC COR # BLD AUTO: 19.37 10*3/MM3 (ref 3.4–10.8)

## 2025-07-01 PROCEDURE — 94799 UNLISTED PULMONARY SVC/PX: CPT

## 2025-07-01 PROCEDURE — 86140 C-REACTIVE PROTEIN: CPT

## 2025-07-01 PROCEDURE — 94664 DEMO&/EVAL PT USE INHALER: CPT

## 2025-07-01 PROCEDURE — 83880 ASSAY OF NATRIURETIC PEPTIDE: CPT

## 2025-07-01 PROCEDURE — 71045 X-RAY EXAM CHEST 1 VIEW: CPT

## 2025-07-01 PROCEDURE — 84145 PROCALCITONIN (PCT): CPT

## 2025-07-01 PROCEDURE — 94669 MECHANICAL CHEST WALL OSCILL: CPT

## 2025-07-01 PROCEDURE — 83735 ASSAY OF MAGNESIUM: CPT

## 2025-07-01 PROCEDURE — 92612 ENDOSCOPY SWALLOW (FEES) VID: CPT

## 2025-07-01 PROCEDURE — 99232 SBSQ HOSP IP/OBS MODERATE 35: CPT

## 2025-07-01 PROCEDURE — 80048 BASIC METABOLIC PNL TOTAL CA: CPT

## 2025-07-01 PROCEDURE — 25810000003 LACTATED RINGERS PER 1000 ML

## 2025-07-01 PROCEDURE — 94761 N-INVAS EAR/PLS OXIMETRY MLT: CPT

## 2025-07-01 PROCEDURE — 85027 COMPLETE CBC AUTOMATED: CPT

## 2025-07-01 RX ORDER — SODIUM CHLORIDE, SODIUM LACTATE, POTASSIUM CHLORIDE, CALCIUM CHLORIDE 600; 310; 30; 20 MG/100ML; MG/100ML; MG/100ML; MG/100ML
75 INJECTION, SOLUTION INTRAVENOUS CONTINUOUS
Status: ACTIVE | OUTPATIENT
Start: 2025-07-01 | End: 2025-07-01

## 2025-07-01 RX ORDER — ACETYLCYSTEINE 200 MG/ML
4 SOLUTION ORAL; RESPIRATORY (INHALATION)
Status: DISCONTINUED | OUTPATIENT
Start: 2025-07-01 | End: 2025-07-03 | Stop reason: HOSPADM

## 2025-07-01 RX ADMIN — BACLOFEN 5 MG: 10 TABLET ORAL at 10:36

## 2025-07-01 RX ADMIN — IPRATROPIUM BROMIDE AND ALBUTEROL SULFATE 3 ML: 2.5; .5 SOLUTION RESPIRATORY (INHALATION) at 23:12

## 2025-07-01 RX ADMIN — DULOXETINE 60 MG: 60 CAPSULE, DELAYED RELEASE ORAL at 10:36

## 2025-07-01 RX ADMIN — ATORVASTATIN CALCIUM 40 MG: 40 TABLET, FILM COATED ORAL at 20:20

## 2025-07-01 RX ADMIN — IPRATROPIUM BROMIDE AND ALBUTEROL SULFATE 3 ML: 2.5; .5 SOLUTION RESPIRATORY (INHALATION) at 04:09

## 2025-07-01 RX ADMIN — ACETYLCYSTEINE 4 ML: 200 SOLUTION ORAL; RESPIRATORY (INHALATION) at 19:31

## 2025-07-01 RX ADMIN — Medication 10 ML: at 20:20

## 2025-07-01 RX ADMIN — SODIUM CHLORIDE, POTASSIUM CHLORIDE, SODIUM LACTATE AND CALCIUM CHLORIDE 75 ML/HR: 600; 310; 30; 20 INJECTION, SOLUTION INTRAVENOUS at 10:34

## 2025-07-01 RX ADMIN — BUDESONIDE AND FORMOTEROL FUMARATE DIHYDRATE 2 PUFF: 160; 4.5 AEROSOL RESPIRATORY (INHALATION) at 07:47

## 2025-07-01 RX ADMIN — FLUTICASONE PROPIONATE 2 SPRAY: 50 SPRAY, METERED NASAL at 10:41

## 2025-07-01 RX ADMIN — Medication 10 ML: at 10:41

## 2025-07-01 RX ADMIN — AZITHROMYCIN DIHYDRATE 500 MG: 250 TABLET ORAL at 10:36

## 2025-07-01 RX ADMIN — PANTOPRAZOLE SODIUM 40 MG: 40 TABLET, DELAYED RELEASE ORAL at 05:11

## 2025-07-01 RX ADMIN — ACETYLCYSTEINE 4 ML: 200 SOLUTION ORAL; RESPIRATORY (INHALATION) at 13:00

## 2025-07-01 RX ADMIN — BACLOFEN 5 MG: 10 TABLET ORAL at 20:20

## 2025-07-01 RX ADMIN — BUDESONIDE AND FORMOTEROL FUMARATE DIHYDRATE 2 PUFF: 160; 4.5 AEROSOL RESPIRATORY (INHALATION) at 19:31

## 2025-07-01 RX ADMIN — LISINOPRIL 10 MG: 10 TABLET ORAL at 10:36

## 2025-07-01 RX ADMIN — IPRATROPIUM BROMIDE AND ALBUTEROL SULFATE 3 ML: 2.5; .5 SOLUTION RESPIRATORY (INHALATION) at 07:47

## 2025-07-01 RX ADMIN — BACLOFEN 5 MG: 10 TABLET ORAL at 15:22

## 2025-07-01 RX ADMIN — PRAZOSIN HYDROCHLORIDE 2 MG: 1 CAPSULE ORAL at 20:20

## 2025-07-01 RX ADMIN — IPRATROPIUM BROMIDE AND ALBUTEROL SULFATE 3 ML: 2.5; .5 SOLUTION RESPIRATORY (INHALATION) at 13:00

## 2025-07-01 RX ADMIN — IPRATROPIUM BROMIDE AND ALBUTEROL SULFATE 3 ML: 2.5; .5 SOLUTION RESPIRATORY (INHALATION) at 19:31

## 2025-07-01 RX ADMIN — NICOTINE 1 PATCH: 21 PATCH, EXTENDED RELEASE TRANSDERMAL at 10:36

## 2025-07-01 RX ADMIN — IPRATROPIUM BROMIDE AND ALBUTEROL SULFATE 3 ML: 2.5; .5 SOLUTION RESPIRATORY (INHALATION) at 16:26

## 2025-07-01 RX ADMIN — METOPROLOL SUCCINATE 25 MG: 25 TABLET, EXTENDED RELEASE ORAL at 10:36

## 2025-07-01 NOTE — MBS/VFSS/FEES
Acute Care - Speech Language Pathology   Swallow Initial Evaluation [facility]  Fiberoptic Endoscopic Evaluation of Swallowing (FEES)       Patient Name: Oscar Avila  : 1960  MRN: [id number]  Today's Date: 2025               Admit Date: 2025    Visit Dx:     ICD-10-CM ICD-9-CM   1. SOB (shortness of breath)  R06.02 786.05   2. Hypoxia  R09.02 799.02   3. Pneumonia of both lower lobes due to infectious organism  J18.9 486   4. Leukocytosis, unspecified type  D72.829 288.60   5. Hyponatremia  E87.1 276.1   6. Pharyngeal dysphagia  R13.13 787.23     Patient Active Problem List   Diagnosis    COPD (chronic obstructive pulmonary disease)    Hyperlipidemia    Coronary artery calcification    Emphysema, unspecified    Current smoker    Alcoholism in recovery    Primary hypertension    Polyp of colon    ANGELA (obstructive sleep apnea)    Acute hypoxic respiratory failure    Sepsis    Pneumonia    Hyponatremia    Abdominal pain    Bronchitis    COPD (chronic obstructive pulmonary disease)    Tobacco abuse    GERD without esophagitis    Ileus     Past Medical History:   Diagnosis Date    Alcohol use     Hyperlipidemia     Hypertension     Neuromuscular disorder      Past Surgical History:   Procedure Laterality Date    CARPAL TUNNEL RELEASE Left     CARPAL TUNNEL RELEASE Right 2023    Dr. Esa Acosta    FOOT FRACTURE SURGERY Right 2018    fracture to right foot - has a metal pin on great toe    WISDOM TOOTH EXTRACTION         SLP Recommendation and Plan  SLP Swallowing Diagnosis: moderate, pharyngeal dysphagia (25 1410)  SLP Diet Recommendation: soft to chew textures, chopped, nectar thick liquids, no mixed consistencies (25 1410)  Recommended Precautions and Strategies: general aspiration precautions, no straw, other (see comments) (small/single bites & sips) (25 1410)  SLP Rec. for Method of Medication Administration: meds whole, with puree (25 1410)      Monitor for Signs of Aspiration: yes, notify SLP if any concerns (07/01/25 1410)     Swallow Criteria for Skilled Therapeutic Interventions Met: demonstrates skilled criteria (07/01/25 1410)  Anticipated Discharge Disposition (SLP): inpatient rehabilitation facility (07/01/25 1410)  Rehab Potential/Prognosis, Swallowing: good, to achieve stated therapy goals (07/01/25 1410)  Therapy Frequency (Swallow): 5 days per week (07/01/25 1410)  Predicted Duration Therapy Intervention (Days): 1 week (07/01/25 1410)  Oral Care Recommendations: Oral Care BID/PRN, Toothbrush (07/01/25 1410)                                               SWALLOW EVALUATION (Last 72 Hours)       SLP Adult Swallow Evaluation       Row Name 07/01/25 1410 06/30/25 1330                Rehab Evaluation    Document Type re-evaluation  - re-evaluation  -       Subjective Information no complaints  - no complaints  -       Patient Observations alert;cooperative  - alert;cooperative  -       Patient/Family/Caregiver Comments/Observations No family present  - --       Patient Effort good  - good  -       Symptoms Noted During/After Treatment none  - --          General Information    Patient Profile Reviewed yes  - --       Pertinent History Of Current Problem See initial eval, pt referred for FEES  -[facility] RN contacted SLP. MD requesting re-eval for ? readiness to advance off honey-thick liquids.  -       Current Method of Nutrition soft to chew textures;chopped;honey-thick liquids  - soft to chew textures;chopped;honey-thick liquids  -SM       Precautions/Limitations, Vision WFL with corrective lenses;for purposes of eval  - --       Precautions/Limitations, Hearing WFL;for purposes of eval  - --       Prior Level of Function-Communication unknown  - --       Prior Level of Function-Swallowing safe, efficient swallowing in all situations  - safe, efficient swallowing in all situations  -SM       Plans/Goals Discussed with  patient;agreed upon  - patient;agreed upon  -       Barriers to Rehab none identified  - none identified  -       Patient's Goals for Discharge patient did not state  - return to regular diet  -          Pain    Pretreatment Pain Rating -- 0/10 - no pain  -       Posttreatment Pain Rating -- 0/10 - no pain  -       Additional Documentation Pain Scale: FACES Pre/Post-Treatment (Group)  - --          Pain Scale: FACES Pre/Post-Treatment    Pain: FACES Scale, Pretreatment 0-->no hurt  - --       Posttreatment Pain Rating 0-->no hurt  - --          Clinical Swallow Eval    Pharyngeal Phase -- suspected pharyngeal impairment  -       Clinical Swallow Evaluation Summary -- No overt signs aspiration though known degree silent aspiration per last FEES. Pt in agreement for below recommendations.  -          Fiberoptic Endoscopic Evaluation of Swallowing (FEES)    Risks/Benefits Reviewed risks/benefits explained;patient;agreed to eval  - --       Nasal Entry right:  - --       Scope serial number/identification [id number]  - --          Anatomy and Physiology    Anatomic Considerations no anatomic structural deviation  - --       Velopharyngeal CNA  - --       Base of Tongue symmetrical  - --       Laryngeal Function Breathing symmetrical  - --       Laryngeal Function Phonation symmetrical  - --       Laryngeal Function to Breath Hold TVF/FVF/Arytenoid;sustained closure  - --       Secretion Rating Scale (Felton et al. 1996) 0- normal, no visible secretions  - --       Ice Chips DNA  - --       Spontaneous Swallow frequency adequate  - --       Sensory sensed scope  - --       Utensils Used Spoon;Cup;Straw  - --       Consistencies Trialed thin liquids;nectar-thick liquids;honey-thick liquids;pudding/puree;regular textures  - --          FEES Interpretation    Oral Phase prespill of liquids into pharynx  - --          Initiation of Pharyngeal Swallow    Initiation of  Pharyngeal Swallow bolus in pyriform sinuses  -MH --       Pharyngeal Phase impaired pharyngeal phase of swallowing  -MH --       Penetration Before the Swallow thin liquids;secondary to delayed swallow initiation or mistiming;secondary to reduced back of tongue control  -MH --       Aspiration During the Swallow thin liquids;secondary to reduced laryngeal elevation;secondary to delayed swallow initiation or mistiming  -MH --       Penetration After the Swallow nectar-thick liquids;secondary to residue;in valleculae;in pyriform sinuses  - --       Depth of Penetration deep  - --       Response to Penetration No  -MH --       No spontaneous response to penetration and non-effective laryngeal clearance with cue (see comments);other (see comments)  cued cough  - --       Response to Aspiration No  -MH --       No spontaneous response to aspiration with non-effective subglottic clearance with cue (see comments);other (see comments)  cued cough  - --       Rosenbek's Scale thin:;8-->Level 8;nectar:;3-->Level 3;honey:;pudding/puree:;1-->Level 1  NTL level 3 via straw only  - --       Residue no significant pharyngeal residue noted  - --       Attempted Compensatory Maneuvers bolus size;bolus presentation style;additional subsequent swallow;effortful (hard swallow)  - --       Response to Attempted Compensatory Maneuvers did not prevent penetration;did not prevent aspiration  - --       Successful Compensatory Maneuver Competency patient able to;demonstrate compensations  - --       FEES Summary Moderate pharyngeal dysphagia. Prespill w/ thins & NTL to the pyriforms. Deep penetration w/ thins before the swallow w/ subsequent silent aspiration during the swallow. Cued cough ineffective in clearing. Penetration w/ NTL via straw after the swallow, aspiration deemed inevitable given depth of penetration & inability to clear. No penetration/aspiration w/ NTL via cup, HTL, pudding, or regular solid  consistenices. Recommend soft/chopped solid diet w/ NTL, no mixed consistenices, no straw, small/single bites & sips  - --          SLP Evaluation Clinical Impression    SLP Swallowing Diagnosis moderate;pharyngeal dysphagia  - R/O pharyngeal dysphagia  -       Functional Impact risk of aspiration/pneumonia  - risk of aspiration/pneumonia  -       Rehab Potential/Prognosis, Swallowing good, to achieve stated therapy goals  - --       Swallow Criteria for Skilled Therapeutic Interventions Met demonstrates skilled criteria  - --          Recommendations    Therapy Frequency (Swallow) 5 days per week  - --       Predicted Duration Therapy Intervention (Days) 1 week  - --       SLP Diet Recommendation soft to chew textures;chopped;nectar thick liquids;no mixed consistencies  - soft to chew textures;chopped;no mixed consistencies;honey thick liquids;water between meals after oral care, with supervision;other (see comments)  one small cup water every 2 hours, as tolerated, between meals.  -       Recommended Diagnostics -- FEES  tomorrow, 7/1  -       Recommended Precautions and Strategies general aspiration precautions;no straw;other (see comments)  small/single bites & sips  - general aspiration precautions  -       Oral Care Recommendations Oral Care BID/PRN;Toothbrush  - Oral Care BID/PRN;Toothbrush  -       SLP Rec. for Method of Medication Administration meds whole;with puree  - meds whole;with puree  -       Monitor for Signs of Aspiration yes;notify SLP if any concerns  - yes;notify SLP if any concerns  -       Anticipated Discharge Disposition (SLP) inpatient rehabilitation facility  - inpatient rehabilitation facility  -                 User Key  (r) = Recorded By, (t) = Taken By, (c) = Cosigned By      Initials Name Effective Dates     Daylin Donaldson, MS CCC-SLP 01/20/25 -      Latha Giles, MS CCC-SLP 05/12/23 -                     EDUCATION  The  patient has been educated in the following areas:   Dysphagia (Swallowing Impairment) Oral Care/Hydration Modified Diet Instruction.        SLP GOALS       Row Name 07/01/25 1410             ([facility]) Patient will demonstrate functional swallow for    Diet Texture (Demonstrate functional swallow) regular textures  -      Liquid viscosity (Demonstrate functional swallow) thin liquids  -      [place] (Demonstrate functional swallow) with minimal cues (75-90% accuracy)  -      Time Frame (Demonstrate functional swallow) 1 week  -      Progress/Outcomes (Demonstrate functional swallow) goal ongoing  -         ([facility]) Patient will tolerate trials of    Consistencies Trialed (Tolerate trials) soft to chew (chopped) textures;nectar/ mildly thick liquids  -      Desired Outcome (Tolerate trials) without signs/symptoms of aspiration  -      [place] (Tolerate trials) with minimal cues (75-90% accuracy)  -      Time Frame (Tolerate trials) 1 week  -      Progress/Outcomes (Tolerate trials) goal revised this date  -         ([facility]) Patient will tolerate therapeutic trials of    Consistencies Trialed (Tolerate therapeutic trials) regular textures;thin liquids  -      Desired Outcome (Tolerate therapeutic trials) without signs/symptoms of aspiration  -      [place] (Tolerate therapeutic trials) with 1:1 assist/ supervision  -      Time Frame (Tolerate therapeutic trials) 1 week  -      Progress/Outcomes (Tolerate therapeutic trials) goal no longer appropriate  -         ([facility]) Pharyngeal Strengthening Exercise Goal 1 (SLP)    Activity (Pharyngeal Strengthening Goal 1, SLP) increase timing  -      Increase Timing prepping - 3 second prep or suck swallow or 3-step swallow;Mendelsohn;super-supraglottic swallow  -      [place]/Accuracy (Pharyngeal Strengthening Goal 1, SLP) with minimal cues (75-90% accuracy)  -      Time Frame (Pharyngeal Strengthening Goal 1, SLP) 1 week  -       Progress/Outcomes (Pharyngeal Strengthening Goal 1, SLP) goal ongoing  -         ([facility]) Swallow Management Recall Goal 1 (SLP)    Activity (Swallow Management Recall Goal 1, SLP) independent recall of;safe diet/liquid level;compensatory swallow strategies/techniques;other (see comments)  -      [place]/Accuracy (Swallow Management Recall Goal 1, SLP) independently (over 90% accuracy)  -      Time Frame (Swallow Management Recall Goal 1, SLP) 1 week  -      Progress/Outcomes (Swallow Management Recall Goal 1, SLP) goal no longer appropriate  -         ([facility]) Swallow Compensatory Strategies Goal 1 (SLP)    Activity (Swallow Compensatory Strategies/Techniques Goal 1, SLP) small straw sips  -      [place]/Accuracy (Swallow Compensatory Strategies/Techniques Goal 1, SLP) independently (over 90% accuracy)  -      Time Frame (Swallow Compensatory Strategies/Techniques Goal 1, SLP) 1 week  -      Progress/Outcomes (Swallow Compensatory Strategies/Techniques Goal 1, SLP) goal no longer appropriate  -                User Key  (r) = Recorded By, (t) = Taken By, (c) = Cosigned By      Initials Name Provider Type    Latha Guo MS CCC-SLP Speech and Language Pathologist                         Time Calculation:    Time Calculation- SLP       Row Name 07/01/25 1646             Time Calculation- SLP    SLP Start Time 1410  -      SLP Received On 07/01/25  -         Untimed Charges    28689-KW Fiberoptic Endo Eval Swallow Minutes 103  -         Total Minutes    Untimed Charges Total Minutes 103  -       Total Minutes 103  -                User Key  (r) = Recorded By, (t) = Taken By, (c) = Cosigned By      Initials Name Provider Type    Latha Guo, MS CCC-SLP Speech and Language Pathologist                    Therapy Charges for Today       Code Description Service Date Service Provider Modifiers Qty    [id number]  ST FIBEROPTIC ENDO EVAL SWALL 7 7/1/2025 Latha Giles,  MS CCC-SLP GN 1                 Latha Giles, MS TY-SLP  7/1/2025

## 2025-07-01 NOTE — PLAN OF CARE
Problem: Adult Inpatient Plan of Care  Goal: Absence of Hospital-Acquired Illness or Injury  Intervention: Identify and Manage Fall Risk  Recent Flowsheet Documentation  Taken 7/1/2025 0415 by Joy Reynoso RN  Safety Promotion/Fall Prevention:   activity supervised   assistive device/personal items within reach   safety round/check completed  Taken 7/1/2025 0200 by Joy Reynoso RN  Safety Promotion/Fall Prevention:   activity supervised   assistive device/personal items within reach   safety round/check completed  Taken 7/1/2025 0015 by Joy Reynoso RN  Safety Promotion/Fall Prevention:   activity supervised   assistive device/personal items within reach   safety round/check completed  Taken 6/30/2025 2200 by Joy Reynoso RN  Safety Promotion/Fall Prevention:   activity supervised   assistive device/personal items within reach   safety round/check completed  Taken 6/30/2025 2005 by Joy Reynoso RN  Safety Promotion/Fall Prevention:   activity supervised   assistive device/personal items within reach   safety round/check completed     Problem: Adult Inpatient Plan of Care  Goal: Absence of Hospital-Acquired Illness or Injury  Intervention: Prevent Skin Injury  Recent Flowsheet Documentation  Taken 7/1/2025 0415 by Joy Reynoso RN  Body Position: position changed independently  Skin Protection: incontinence pads utilized  Taken 7/1/2025 0200 by Joy Reynoso RN  Body Position: position changed independently  Skin Protection: incontinence pads utilized  Taken 7/1/2025 0015 by Joy Reynoos RN  Body Position: position changed independently  Skin Protection: incontinence pads utilized  Taken 6/30/2025 2200 by Joy Reynoso RN  Body Position: position changed independently  Skin Protection: incontinence pads utilized  Taken 6/30/2025 2005 by Joy Reynoso RN  Body Position: position changed independently  Skin Protection: incontinence pads utilized     Problem: Sepsis/Septic Shock  Goal: Absence of Infection Signs  and Symptoms  Intervention: Promote Recovery  Recent Flowsheet Documentation  Taken 7/1/2025 0415 by Joy Reynoso RN  Activity Management: activity encouraged  Taken 7/1/2025 0200 by Joy Reynoso RN  Activity Management: activity encouraged  Taken 7/1/2025 0015 by Joy Reynoso RN  Activity Management: activity encouraged  Taken 6/30/2025 2200 by Joy Reynoso RN  Activity Management: activity encouraged  Taken 6/30/2025 2005 by Joy Reynoso RN  Activity Management: activity encouraged     Problem: Fall Injury Risk  Goal: Absence of Fall and Fall-Related Injury  Intervention: Promote Injury-Free Environment  Recent Flowsheet Documentation  Taken 7/1/2025 0415 by Joy Reynoso RN  Safety Promotion/Fall Prevention:   activity supervised   assistive device/personal items within reach   safety round/check completed  Taken 7/1/2025 0200 by Joy Reynoso RN  Safety Promotion/Fall Prevention:   activity supervised   assistive device/personal items within reach   safety round/check completed  Taken 7/1/2025 0015 by Joy Reynoso RN  Safety Promotion/Fall Prevention:   activity supervised   assistive device/personal items within reach   safety round/check completed  Taken 6/30/2025 2200 by Joy Reynoso RN  Safety Promotion/Fall Prevention:   activity supervised   assistive device/personal items within reach   safety round/check completed  Taken 6/30/2025 2005 by Joy Reynoso RN  Safety Promotion/Fall Prevention:   activity supervised   assistive device/personal items within reach   safety round/check completed   Goal Outcome Evaluation:           Progress: no change

## 2025-07-01 NOTE — PLAN OF CARE
Goal Outcome Evaluation:                   Anticipated Discharge Disposition (SLP): inpatient rehabilitation facility          SLP Swallowing Diagnosis: moderate, pharyngeal dysphagia (07/01/25 1237)

## 2025-07-01 NOTE — PROGRESS NOTES
[facility]  PROGRESS NOTE    Patient Name: Oscar Avila  : 1960  MRN: [id number]    Date of Admission: 2025  Primary Care Physician: Concepción Quigley DO    Subjective   Subjective     CC:  Pneumonia    HPI:  Patient now on high flow nasal cannula oxygen overnight, inflammatory markers are improving overall however concerned about atelectasis.  Remains on high flow otherwise is stable and states he feels okay.      Objective   Objective     Vital Signs:   Temp:  [97.7 °F (36.5 °C)-98.4 °F (36.9 °C)] 97.7 °F (36.5 °C)  Heart Rate:  [] 70  Resp:  [16-20] 18  BP: (132-150)/(71-84) 139/71  Flow (L/min) (Oxygen Therapy):  [3-50] 50     Physical Exam:  Constitutional: No acute distress, awake, alert  HENT: NCAT, mucous membranes moist  Respiratory: Clear to auscultation bilaterally, respiratory effort normal   Cardiovascular: RRR, no murmurs, rubs, or gallops  Gastrointestinal: Positive bowel sounds, soft, nontender, nondistended  Musculoskeletal: No bilateral ankle edema  Psychiatric: Appropriate affect, cooperative  Neurologic: Oriented x 3, strength symmetric in all extremities, Cranial Nerves grossly intact to confrontation, speech clear  Skin: No rashes     Results Reviewed:  LAB RESULTS:      Lab 25  0637 25  0920 25  0859 25  0841 25  0026 25  1924 25  1649 25  1539   WBC 19.37* 15.61* 15.42* 17.34* 14.61*  --   --  17.53*   HEMOGLOBIN 12.4* 11.5* 12.2* 12.0* 13.1  --   --  13.2   HEMATOCRIT 37.3* 34.3* 36.0* 35.8* 40.6  --   --  39.4   PLATELETS 436 389 351 300 263  --   --  280   NEUTROS ABS  --   --   --   --  12.90*  --   --  15.49*   IMMATURE GRANS (ABS)  --   --   --   --  0.07*  --   --  0.09*   LYMPHS ABS  --   --   --   --  0.61*  --   --  0.50*   MONOS ABS  --   --   --   --  1.00*  --   --  1.43*   EOS ABS  --   --   --   --  0.00  --   --  0.00   MCV 99.5* 97.4* 99.4* 97.5* 101.8*  --    --  98.0*   CRP  --   --   --   --   --   --   --  34.44*   PROCALCITONIN  --   --   --   --   --   --   --  0.43*   LACTATE  --   --   --   --   --  0.9  --  2.1*   HSTROP T  --   --   --   --   --   --  15 14         Lab 07/01/25  0637 06/30/25  1600 06/30/25  0920 06/29/25  2339 06/29/25  1650 06/29/25  0859 06/28/25  1729 06/28/25  0841 06/26/25  0846 06/26/25  0026   SODIUM 139 139 142 141 141 141   < > 136   < > 131*   POTASSIUM 4.0  --  3.5  --   --  3.9  --  4.1  --  4.5   CHLORIDE 104  --  109*  --   --  108*  --  104  --  99   CO2 26.0  --  21.2*  --   --  21.7*  --  23.0  --  16.1*   ANION GAP 9.0  --  11.8  --   --  11.3  --  9.0  --  15.9*   BUN 11.3  --  11.0  --   --  11.5  --  14.5  --  18.9   CREATININE 0.72*  --  0.64*  --   --  0.67*  --  0.69*  --  1.07   EGFR 101.4  --  105.1  --   --  103.6  --  102.7  --  77.0   GLUCOSE 80  --  111*  --   --  100*  --  140*  --  112*   CALCIUM 8.7  --  8.2*  --   --  8.4*  --  8.7  --  8.3*   MAGNESIUM 2.2  --  1.9  --   --  2.0  --  2.2  --  2.0    < > = values in this interval not displayed.         Lab 06/25/25  1539   TOTAL PROTEIN 7.1   ALBUMIN 3.6   GLOBULIN 3.5   ALT (SGPT) 9   AST (SGOT) 21   BILIRUBIN 0.4   ALK PHOS 94   LIPASE 22         Lab 06/25/25  1649 06/25/25  1539   PROBNP  --  483.8   HSTROP T 15 14             Lab 06/27/25  1003   FOLATE 6.52   VITAMIN B 12 568         Lab 06/25/25  2306 06/25/25  1636   PH, ARTERIAL 7.416 7.507*   PCO2, ARTERIAL 31.7* 24.6*   PO2 ART 58.3* 66.4*   FIO2 32 28   HCO3 ART 20.4 19.5*   BASE EXCESS ART -3.4* -2.1*   CARBOXYHEMOGLOBIN 1.1 1.8     Brief Urine Lab Results  (Last result in the past 365 days)        Color   Clarity   Blood   Leuk Est   Nitrite   Protein   CREAT   Urine HCG        06/25/25 2230 Dark Yellow   Clear   Negative   Small (1+)   Negative   30 mg/dL (1+)                   Microbiology Results Abnormal       Procedure Component Value - Date/Time    Legionella Antigen, Urine - Urine, Urine,  Clean Catch [[id number]]  (Abnormal) Collected: 06/26/25 1753    Lab Status: Final result Specimen: Urine, Clean Catch Updated: 06/27/25 0628     LEGIONELLA ANTIGEN, URINE Positive            XR Chest 1 View  Result Date: 6/30/2025  XR CHEST 1 VW Date of Exam: 6/30/2025 2:57 AM EDT Indication: hypoxia Comparison: 6/25/2025. Findings: There is no pneumothorax, pleural effusion or focal airspace consolidation. Heart size and pulmonary vasculature appear within normal limits. Regional bones appear intact.     Impression: Impression: No acute cardiopulmonary abnormality. Electronically Signed: Felton Jaime MD  6/30/2025 4:32 AM EDT  Workstation ID: [id number]      Results for orders placed during the hospital encounter of 06/25/25    Adult Transthoracic Echo Complete W/ Cont if Necessary Per Protocol 06/26/2025  4:44 PM    Interpretation Summary    Technically difficult study.    Left ventricular ejection fraction appears to be 51 - 55%.    Normal right ventricular wall thickness and systolic function noted. The right ventricular cavity is mildly dilated.    No hemodynamically significant valvular stenosis or regurgitation    The left atrial cavity is moderately dilated.      Current medications:  Scheduled Meds:atorvastatin, 40 mg, Oral, Nightly  azithromycin, 500 mg, Oral, Q24H  baclofen, 5 mg, Oral, TID  budesonide-formoterol, 2 puff, Inhalation, BID - RT  DULoxetine, 60 mg, Oral, Daily  fluticasone, 2 spray, Each Nare, Daily  ipratropium-albuterol, 3 mL, Nebulization, Q4H - RT  lisinopril, 10 mg, Oral, Daily  metoprolol succinate XL, 25 mg, Oral, Daily  nicotine, 1 patch, Transdermal, Q24H  pantoprazole, 40 mg, Oral, Q AM  prazosin, 2 mg, Oral, Nightly  sodium chloride, 10 mL, Intravenous, Q12H      Continuous Infusions:lactated ringers, 75 mL/hr        PRN Meds:.  acetaminophen    ipratropium-albuterol    sodium chloride    [COMPLETED] Insert Peripheral IV **AND** sodium chloride    sodium chloride    sodium  chloride    Assessment & Plan   Assessment & Plan     Active Hospital Problems    Diagnosis  POA    **Acute hypoxic respiratory failure [J96.01]  Yes    Ileus [K56.7]  Yes    Sepsis [A41.9]  Yes    Pneumonia [J18.9]  Yes    Hyponatremia [E87.1]  Yes    Abdominal pain [R10.9]  Yes    Bronchitis [J40]  Yes    COPD (chronic obstructive pulmonary disease) [J44.9]  Yes    Tobacco abuse [Z72.0]  Yes    GERD without esophagitis [K21.9]  Yes    Primary hypertension [I10]  Yes    Hyperlipidemia [E78.5]  Yes      Resolved Hospital Problems   No resolved problems to display.        Brief Hospital Course to date:  Oscar Avila is a 64 y.o. male w/ a hx of COPD, ongoing tobacco use, HTN, HLD who presented to the ED w/ c/o shortness of breath and coughing.      Acute hypoxic respiratory failure worsening  Sepsis 2/2 Pneumonia - resolved  Bronchitis/Hx of COPD   Tobacco use  Leukocytosis - related to steroid  COPD exacerbation - improving  Legionella pneumonia  Legionnaires disease - improved  meets sepsis criteria based on HR 112bpm, temp 102.3, 87% on RA, WBC 17.53, CRP 34.44. procal 0.43, lactic acid 2.1 and + source (PNA per CTA Chest)   Symptomatic from RLL pneumonia and COPD exacerbation evident by increasing oxygen requirements, increased phlegm production and shortness of breath  ABG c/w hypoxia (pH 7.5, CO2 24, O2 66.4) and admission  CTA Chest: Densely consolidating RLL pneumonia, bronchitis, and nonspecific perirenal fat stranding negative for PE  bedside dysphagia screen   Blood cultures NGTD, urine cultures negative, MRSA swab negative, streptococcal urine antigen negative  Legionella urine antigen positive  respiratory panel negative   S/p doxycycline and Rocephin, given the patient is requiring high flow nasal cannula oxygen would categorize this as moderate to severe disease, QTc is 413, will transition patient to azithromycin as first-line agent  S/p 4 days of IV azithromycin, transition to p.o.  Will  likely need to be treated for 14 days,  Source is unclear at this time, have asked family to check his HVAC unit at home  s/p NS 2,430ml given in ED  prn and scheduled nebs   continue Flonase, Advair/Wixela (sub Symbicort)  pt/ot and case mgt consult, recommending inpatient rehab  Echo TTE showed EF of 51 to 55% with no diastolic dysfunction noted  Now on high flow nasal cannula oxygen, initiate mucolytic's, flutter valve, chest x-ray from today concerning for patchy opacity within the RLL appears slightly increased from prior examination  Inflammatory markers are improving overall, this is either atelectasis versus worsening aspiration  Plan for F EES today by SLP  Initiate chest physiotherapy, hypertonic saline and Mucomyst, encourage aggressive I-S, concerned about atelectasis  Aggressive PT/OT  Low threshold for pulmonology consult if above does not work     Hyponatremia - Legionella related - resolved  sodium 127 (136 today), due to legionnaires disease   recheck sodium tomorrow a.m. with BMP  Serum osmolarity 279, urine osmolarity 731, urine sodium 24  s/p 2,430ml NS bolus given in ED  S/p NS @ 75ml/hour x 12 hours (adjust as needed), hold off on additional fluids  no evidence of cirrhosis on CT abdomen with liver enzymes WNL  Echo TTE without heart failure and normal EF  Hyponatremia likely in the setting of Legionella pneumonia, improving with antibiotic coverage     Abdominal pain  Hiccups  likely musculoskeletal in nature; abdominal exam benign, pt c/o pain w/ movement, coughing and w/ hiccups   pt also reported falling on Friday and again Tuesday night- evaluated in ED Tuesday afternoon   CT abdomen pelvis showed densely consolidated RLL pneumonia, nonspecific perirenal fat stranding with probable bland edema, colonic diverticulosis, prominent small bowel loops in lower abdomen and multiple gas-filled small bowel loops suggesting a generalized ileus rather than bowel obstruction and a small hiatal  hernia  Patient on steroids for COPD exacerbation, have significantly helped, will add on baclofen as well  S/p evaluation by SLP, s/p FEES, found to have moderate pharyngeal dysphagia  Dietary recommendations soft to chew textures, chopped, honey thick liquids, no mixed consistencies  Plan for F EES today     HTN  HLD  EKG stable  proBNP and troponin both WNL   continue routine statin   continue Toprol, lisinopril, prazosin w/ hold parameters      GERD  continue PPI    Expected Discharge Location and Transportation: 6/30/2025  Expected Discharge home  Expected Discharge Date: 6/28/2025; Expected Discharge Time:      VTE Prophylaxis:  Mechanical VTE prophylaxis orders are present.    Total time spent: Time Spent: Time Spent: 40 minutes  Time spent includes time reviewing chart, face-to-face time, counseling patient/family/caregiver, ordering medications/tests/procedures, communicating with other health care professionals, documenting clinical information in the electronic health record, and coordination of care.      AM-PAC 6 Clicks Score (PT): 20 (06/30/25 2005)    CODE STATUS:   Code Status and Medical Interventions: CPR (Attempt to Resuscitate); Full Support   Ordered at: 06/25/25 2015     Code Status (Patient has no pulse and is not breathing):    CPR (Attempt to Resuscitate)     Medical Interventions (Patient has pulse or is breathing):    Full Support       Shantell Martell MD  07/01/25

## 2025-07-01 NOTE — PROGRESS NOTES
Continued Stay Note  [facility]     Patient Name: Oscar Avila  MRN: [id number]  Today's Date: 7/1/2025    Admit Date: 6/25/2025    Plan: TBD   Discharge Plan       Row Name 07/01/25 1325       Plan    Plan TBD    Plan Comments Patient still desires discharge to home with assistance/support from wife.  Case Managment will continue to follow and will assist with any discharge planning arrangements.                   Discharge Codes    No documentation.                 Expected Discharge Date and Time       Expected Discharge Date Expected Discharge Time    Jun 28, 2025               Keyla Guevara, RN

## 2025-07-02 LAB
ANION GAP SERPL CALCULATED.3IONS-SCNC: 6 MMOL/L (ref 5–15)
BUN SERPL-MCNC: 10.2 MG/DL (ref 8–23)
BUN/CREAT SERPL: 15.2 (ref 7–25)
CALCIUM SPEC-SCNC: 8.3 MG/DL (ref 8.6–10.5)
CHLORIDE SERPL-SCNC: 104 MMOL/L (ref 98–107)
CO2 SERPL-SCNC: 28 MMOL/L (ref 22–29)
CREAT SERPL-MCNC: 0.67 MG/DL (ref 0.76–1.27)
DEPRECATED RDW RBC AUTO: 45.3 FL (ref 37–54)
EGFRCR SERPLBLD CKD-EPI 2021: 103.6 ML/MIN/1.73
ERYTHROCYTE [DISTWIDTH] IN BLOOD BY AUTOMATED COUNT: 12.6 % (ref 12.3–15.4)
GLUCOSE SERPL-MCNC: 86 MG/DL (ref 65–99)
HCT VFR BLD AUTO: 35.8 % (ref 37.5–51)
HGB BLD-MCNC: 12.1 G/DL (ref 13–17.7)
MAGNESIUM SERPL-MCNC: 1.9 MG/DL (ref 1.6–2.4)
MCH RBC QN AUTO: 33.3 PG (ref 26.6–33)
MCHC RBC AUTO-ENTMCNC: 33.8 G/DL (ref 31.5–35.7)
MCV RBC AUTO: 98.6 FL (ref 79–97)
PLATELET # BLD AUTO: 456 10*3/MM3 (ref 140–450)
PMV BLD AUTO: 9.1 FL (ref 6–12)
POTASSIUM SERPL-SCNC: 4 MMOL/L (ref 3.5–5.2)
RBC # BLD AUTO: 3.63 10*6/MM3 (ref 4.14–5.8)
SODIUM SERPL-SCNC: 138 MMOL/L (ref 136–145)
WBC NRBC COR # BLD AUTO: 13.93 10*3/MM3 (ref 3.4–10.8)

## 2025-07-02 PROCEDURE — 97116 GAIT TRAINING THERAPY: CPT

## 2025-07-02 PROCEDURE — 94669 MECHANICAL CHEST WALL OSCILL: CPT

## 2025-07-02 PROCEDURE — 94799 UNLISTED PULMONARY SVC/PX: CPT

## 2025-07-02 PROCEDURE — 25010000002 HEPARIN (PORCINE) PER 1000 UNITS: Performed by: FAMILY MEDICINE

## 2025-07-02 PROCEDURE — 94664 DEMO&/EVAL PT USE INHALER: CPT

## 2025-07-02 PROCEDURE — 99232 SBSQ HOSP IP/OBS MODERATE 35: CPT | Performed by: FAMILY MEDICINE

## 2025-07-02 PROCEDURE — 83735 ASSAY OF MAGNESIUM: CPT

## 2025-07-02 PROCEDURE — 80048 BASIC METABOLIC PNL TOTAL CA: CPT

## 2025-07-02 PROCEDURE — 94667 MNPJ CHEST WALL 1ST: CPT

## 2025-07-02 PROCEDURE — 97110 THERAPEUTIC EXERCISES: CPT

## 2025-07-02 PROCEDURE — 85027 COMPLETE CBC AUTOMATED: CPT

## 2025-07-02 RX ORDER — HEPARIN SODIUM 5000 [USP'U]/ML
5000 INJECTION, SOLUTION INTRAVENOUS; SUBCUTANEOUS EVERY 8 HOURS SCHEDULED
Status: DISCONTINUED | OUTPATIENT
Start: 2025-07-02 | End: 2025-07-03 | Stop reason: HOSPADM

## 2025-07-02 RX ADMIN — IPRATROPIUM BROMIDE AND ALBUTEROL SULFATE 3 ML: 2.5; .5 SOLUTION RESPIRATORY (INHALATION) at 15:42

## 2025-07-02 RX ADMIN — PANTOPRAZOLE SODIUM 40 MG: 40 TABLET, DELAYED RELEASE ORAL at 05:18

## 2025-07-02 RX ADMIN — IPRATROPIUM BROMIDE AND ALBUTEROL SULFATE 3 ML: 2.5; .5 SOLUTION RESPIRATORY (INHALATION) at 08:30

## 2025-07-02 RX ADMIN — ACETYLCYSTEINE 4 ML: 200 SOLUTION ORAL; RESPIRATORY (INHALATION) at 18:57

## 2025-07-02 RX ADMIN — METOPROLOL SUCCINATE 25 MG: 25 TABLET, EXTENDED RELEASE ORAL at 08:24

## 2025-07-02 RX ADMIN — NICOTINE 1 PATCH: 21 PATCH, EXTENDED RELEASE TRANSDERMAL at 08:25

## 2025-07-02 RX ADMIN — ATORVASTATIN CALCIUM 80 MG: 40 TABLET, FILM COATED ORAL at 20:36

## 2025-07-02 RX ADMIN — ACETYLCYSTEINE 4 ML: 200 SOLUTION ORAL; RESPIRATORY (INHALATION) at 15:41

## 2025-07-02 RX ADMIN — BACLOFEN 5 MG: 10 TABLET ORAL at 20:36

## 2025-07-02 RX ADMIN — IPRATROPIUM BROMIDE AND ALBUTEROL SULFATE 3 ML: 2.5; .5 SOLUTION RESPIRATORY (INHALATION) at 22:42

## 2025-07-02 RX ADMIN — BUDESONIDE AND FORMOTEROL FUMARATE DIHYDRATE 2 PUFF: 160; 4.5 AEROSOL RESPIRATORY (INHALATION) at 18:57

## 2025-07-02 RX ADMIN — HEPARIN SODIUM 5000 UNITS: 5000 INJECTION INTRAVENOUS; SUBCUTANEOUS at 20:34

## 2025-07-02 RX ADMIN — DULOXETINE 60 MG: 60 CAPSULE, DELAYED RELEASE ORAL at 08:24

## 2025-07-02 RX ADMIN — HEPARIN SODIUM 5000 UNITS: 5000 INJECTION INTRAVENOUS; SUBCUTANEOUS at 16:25

## 2025-07-02 RX ADMIN — PRAZOSIN HYDROCHLORIDE 2 MG: 1 CAPSULE ORAL at 20:35

## 2025-07-02 RX ADMIN — LISINOPRIL 10 MG: 10 TABLET ORAL at 08:24

## 2025-07-02 RX ADMIN — IPRATROPIUM BROMIDE AND ALBUTEROL SULFATE 3 ML: 2.5; .5 SOLUTION RESPIRATORY (INHALATION) at 18:57

## 2025-07-02 RX ADMIN — Medication 10 ML: at 08:25

## 2025-07-02 RX ADMIN — IPRATROPIUM BROMIDE AND ALBUTEROL SULFATE 3 ML: 2.5; .5 SOLUTION RESPIRATORY (INHALATION) at 02:47

## 2025-07-02 RX ADMIN — Medication 10 ML: at 20:35

## 2025-07-02 RX ADMIN — AZITHROMYCIN DIHYDRATE 500 MG: 250 TABLET ORAL at 08:24

## 2025-07-02 RX ADMIN — BUDESONIDE AND FORMOTEROL FUMARATE DIHYDRATE 2 PUFF: 160; 4.5 AEROSOL RESPIRATORY (INHALATION) at 08:45

## 2025-07-02 RX ADMIN — ACETYLCYSTEINE 4 ML: 200 SOLUTION ORAL; RESPIRATORY (INHALATION) at 08:30

## 2025-07-02 RX ADMIN — IPRATROPIUM BROMIDE AND ALBUTEROL SULFATE 3 ML: 2.5; .5 SOLUTION RESPIRATORY (INHALATION) at 11:24

## 2025-07-02 RX ADMIN — BACLOFEN 5 MG: 10 TABLET ORAL at 15:44

## 2025-07-02 RX ADMIN — BACLOFEN 5 MG: 10 TABLET ORAL at 08:24

## 2025-07-02 NOTE — PLAN OF CARE
Goal Outcome Evaluation:  Plan of Care Reviewed With: patient        Progress: no change  Outcome Evaluation: Patient took 10 steps bed>recliner with CGA x1, distance limited by SOA and decreased O2 sats. Limited by weakness, fatigue and HFNC lines. Recommend IRF with focus on pulmonary rehab at D/C when medically appropriate.

## 2025-07-02 NOTE — THERAPY TREATMENT NOTE
Patient Name: Oscar Avila  : 1960    MRN: [id number]                              Today's Date: 2025       Admit Date: 2025    Visit Dx:     ICD-10-CM ICD-9-CM   1. SOB (shortness of breath)  R06.02 786.05   2. Hypoxia  R09.02 799.02   3. Pneumonia of both lower lobes due to infectious organism  J18.9 486   4. Leukocytosis, unspecified type  D72.829 288.60   5. Hyponatremia  E87.1 276.1   6. Pharyngeal dysphagia  R13.13 787.23     Patient Active Problem List   Diagnosis    COPD (chronic obstructive pulmonary disease)    Hyperlipidemia    Coronary artery calcification    Emphysema, unspecified    Current smoker    Alcoholism in recovery    Primary hypertension    Polyp of colon    ANGELA (obstructive sleep apnea)    Acute hypoxic respiratory failure    Sepsis    Pneumonia    Hyponatremia    Abdominal pain    Bronchitis    COPD (chronic obstructive pulmonary disease)    Tobacco abuse    GERD without esophagitis    Ileus     Past Medical History:   Diagnosis Date    Alcohol use     Hyperlipidemia     Hypertension     Neuromuscular disorder      Past Surgical History:   Procedure Laterality Date    CARPAL TUNNEL RELEASE Left     CARPAL TUNNEL RELEASE Right 2023    Dr. Esa Acosta    FOOT FRACTURE SURGERY Right 2018    fracture to right foot - has a metal pin on great toe    WISDOM TOOTH EXTRACTION        General Information       Row Name 25 0937          Physical Therapy Time and Intention    Document Type therapy note (daily note)  -AS     Mode of Treatment physical therapy  -AS       Row Name 25 0937          General Information    Patient Profile Reviewed yes  -AS     Existing Precautions/Restrictions fall;oxygen therapy device and L/min  HFNC  -AS     Barriers to Rehab medically complex;previous functional deficit  -AS       Row Name 25 0937          Cognition    Orientation Status (Cognition) oriented x 3  -AS       Row Name 25 0937          Safety  Issues/Impairments Affecting Functional Mobility    Safety Issues Affecting Function (Mobility) awareness of need for assistance;insight into deficits/self-awareness;sequencing abilities;safety precautions follow-through/compliance  -AS     Impairments Affecting Function (Mobility) balance;endurance/activity tolerance;shortness of breath;strength  -AS     Comment, Safety Issues/Impairments (Mobility) alert and following commands  -AS               User Key  (r) = Recorded By, (t) = Taken By, (c) = Cosigned By      Initials Name Provider Type    AS Zuleika Buckner PTA Physical Therapist Assistant                   Mobility       Row Name 07/02/25 0937          Bed Mobility    Supine-Sit [place] (Bed Mobility) standby assist  -AS     Assistive Device (Bed Mobility) head of bed elevated  -AS     Comment, (Bed Mobility) no physical assist needed, line management only  -AS       [facility] Name 07/02/25 0937          Transfers    Comment, (Transfers) increased time and effort  -AS       [facility] Name 07/02/25 0937          Bed-Chair Transfer    Bed-Chair [place] (Transfers) standby assist  -AS     Assistive Device (Bed-Chair Transfers) other (see comments)  -AS     Comment, (Bed-Chair Transfer) no AD  -AS       Row Name 07/02/25 0937          Sit-Stand Transfer    Sit-Stand [place] (Transfers) standby assist  -AS     Assistive Device (Sit-Stand Transfers) other (see comments)  -AS     Comment, (Sit-Stand Transfer) no LOB or unsteadiness  -AS       [facility] Name 07/02/25 0937          Gait/Stairs (Locomotion)    [place] Level (Gait) contact guard;1 person assist;1 person to manage equipment  -AS     Assistive Device (Gait) other (see comments)  no AD  -AS     Distance in Feet (Gait) 10  -AS     Deviations/Abnormal Patterns (Gait) bilateral deviations;aamir decreased;gait speed decreased;stride length decreased  -AS     Bilateral Gait Deviations forward flexed posture;heel strike decreased  -AS     Comment,  (Gait/Stairs) Patient took 10 steps bed>recliner with CGA x1, distance limited by SOA and decreased O2 sats. Limited by weakness, fatigue and HFNC lines.  -AS               User Key  (r) = Recorded By, (t) = Taken By, (c) = Cosigned By      Initials Name Provider Type    AS Zuleika Buckner PTA Physical Therapist Assistant                   Obj/Interventions       Row Name 07/02/25 0942          Balance    Dynamic Standing Balance contact guard;1-person assist;1 person to manage equipment  -AS     Position/Device Used, Standing Balance unsupported  -AS     Comment, Balance no LOB  -AS               User Key  (r) = Recorded By, (t) = Taken By, (c) = Cosigned By      Initials Name Provider Type    AS Zuleika Buckner PTA Physical Therapist Assistant                   Goals/Plan    No documentation.                  Clinical Impression       Row Name 07/02/25 0942          Pain    Pretreatment Pain Rating 0/10 - no pain  -AS     Posttreatment Pain Rating 0/10 - no pain  -AS       Row Name 07/02/25 0942          Plan of Care Review    Plan of Care Reviewed With patient  -AS     Progress no change  -AS     Outcome Evaluation Patient took 10 steps bed>recliner with CGA x1, distance limited by SOA and decreased O2 sats. Limited by weakness, fatigue and HFNC lines. Recommend IRF with focus on pulmonary rehab at D/C when medically appropriate.  -AS       Row Name 07/02/25 0942          Vital Signs    Pre SpO2 (%) 90  -AS     O2 Delivery Pre Treatment hi-flow  -AS     Intra SpO2 (%) 87  -AS     O2 Delivery Intra Treatment hi-flow  -AS     Post SpO2 (%) 91  -AS     O2 Delivery Post Treatment hi-flow  -AS     Pre Patient Position Supine  -AS     Intra Patient Position Standing  -AS     Post Patient Position Sitting  -AS       Row Name 07/02/25 0942          Positioning and Restraints    Pre-Treatment Position in bed  -AS     Post Treatment Position chair  -AS     In Chair reclined;call light within reach;encouraged to  call for assist;exit alarm on;waffle cushion;legs elevated  -AS               User Key  (r) = Recorded By, (t) = Taken By, (c) = Cosigned By      Initials Name Provider Type    AS Zuleika Buckner PTA Physical Therapist Assistant                   Outcome Measures       Row Name 07/02/25 0943 07/02/25 0830       How much help from another person do you currently need...    Turning from your back to your side while in flat bed without using bedrails? 4  -AS 4  -KP    Moving from lying on back to sitting on the side of a flat bed without bedrails? 3  -AS 3  -KP    Moving to and from a bed to a chair (including a wheelchair)? 3  -AS 3  -KP    Standing up from a chair using your arms (e.g., wheelchair, bedside chair)? 4  -AS 4  -KP    Climbing 3-5 steps with a railing? 3  -AS 3  -KP    To walk in hospital room? 3  -AS 3  -KP    AM-PAC 6 Clicks Score (PT) 20  -AS 20  -KP    Highest Level of Mobility Goal Walk 10 Steps or More-6  -AS Walk 10 Steps or More-6  -KP      Row Name 07/02/25 0943          Functional Assessment    Outcome Measure Options AM-PAC 6 Clicks Basic Mobility (PT)  -AS               User Key  (r) = Recorded By, (t) = Taken By, (c) = Cosigned By      Initials Name Provider Type    AS Zuleika Buckner PTA Physical Therapist Assistant    Erma Lee RN Registered Nurse                                 Physical Therapy Education       Title: PT OT SLP Therapies (In Progress)       Topic: Physical Therapy (In Progress)       Point: Mobility training (In Progress)       Learning Progress Summary            Patient Acceptance, E, NR by AS at 7/2/2025 0944    Acceptance, E, VU,NR by BA at 6/27/2025 1426                      Point: Home exercise program (In Progress)       Learning Progress Summary            Patient Acceptance, E, NR by AS at 7/2/2025 0944                      Point: Body mechanics (In Progress)       Learning Progress Summary            Patient Acceptance, E, NR by AS at 7/2/2025  0944    Acceptance, E, VU,NR by BA at 6/27/2025 1426                      Point: Precautions (In Progress)       Learning Progress Summary            Patient Acceptance, E, NR by AS at 7/2/2025 0944    Acceptance, E, VU,NR by BA at 6/27/2025 1426                                      User Key       Initials Effective Dates Name Provider Type Discipline    AS 12/13/24 -  Zuleika Buckner PTA Physical Therapist Assistant PT    BA 09/21/21 -  Miguelina Reveles, LAUREN Physical Therapist PT                  PT Recommendation and Plan     Progress: no change  Outcome Evaluation: Patient took 10 steps bed>recliner with CGA x1, distance limited by SOA and decreased O2 sats. Limited by weakness, fatigue and HFNC lines. Recommend IRF with focus on pulmonary rehab at D/C when medically appropriate.     Time Calculation:         PT Charges       Row Name 07/02/25 0944             Time Calculation    Start Time 0845  -AS      PT Received On 07/02/25  -AS      PT Goal Re-Cert Due Date 07/07/25  -AS         Timed Charges    95010 - PT Therapeutic Exercise Minutes 8  -AS      84507 - Gait Training Minutes  16  -AS         Total Minutes    Timed Charges Total Minutes 24  -AS       Total Minutes 24  -AS                User Key  (r) = Recorded By, (t) = Taken By, (c) = Cosigned By      Initials Name Provider Type    AS Zuleika Buckner PTA Physical Therapist Assistant                  Therapy Charges for Today       Code Description Service Date Service Provider Modifiers Qty    70961746615 HC PT THER PROC EA 15 MIN 7/2/2025 Zuleika Buckner, TIMBO GP 1    54258877596 HC GAIT TRAINING EA 15 MIN 7/2/2025 Zuleika Buckner, TIMBO GP 1    50158516443 HC PT THER SUPP EA 15 MIN 7/2/2025 Zuleika Buckner PTA GP 2            PT G-Codes  Outcome Measure Options: AM-PAC 6 Clicks Basic Mobility (PT)  AM-PAC 6 Clicks Score (PT): 20  AM-PAC 6 Clicks Score (OT): 19       Zuleika Buckner PTA  7/2/2025

## 2025-07-02 NOTE — CASE MANAGEMENT/SOCIAL WORK
Continued Stay Note  [facility]     Patient Name: Oscar Avila  MRN: [id number]  Today's Date: 7/2/2025    Admit Date: 6/25/2025    Plan: Home with spouse   Discharge Plan       Row Name 07/02/25 0816       Plan    Plan Home with spouse    Patient/Family in Agreement with Plan yes    Plan Comments Spoke to patient at bedside. Plan is home with spouse. Patient is decling Home Health or Inpatient Rehab. CM will continue to follow.    Final Discharge Disposition Code 01 - home or self-care                   Discharge Codes    No documentation.                 Expected Discharge Date and Time       Expected Discharge Date Expected Discharge Time    Jun 28, 2025               Adrien Damon RN

## 2025-07-02 NOTE — PROGRESS NOTES
[facility] Medicine Services  PROGRESS NOTE    Patient Name: Oscar Avila  : 1960  MRN: [id number]    Date of Admission: 2025  Primary Care Physician: Concepción Quigley DO    Subjective   Subjective     CC:  Pneumonia    HPI:  Patient continues on high flow nasal cannula.  Overall he feels okay.  He is coughing up some sputum and using suction to help with that.  I requested ID consult given his failure to improve and Legionella pneumonia.      Objective   Objective     Vital Signs:   Temp:  [97.6 °F (36.4 °C)-98.2 °F (36.8 °C)] 97.8 °F (36.6 °C)  Heart Rate:  [65-85] 71  Resp:  [16-21] 18  BP: (125-164)/(78-83) 125/78  Flow (L/min) (Oxygen Therapy):  [40-50] 50     Physical Exam:  Constitutional: No acute distress, awake, alert  HENT: NCAT, mucous membranes moist  Respiratory: Decreased breath sounds bilaterally, respiratory effort normal, currently on high flow nasal cannula  Cardiovascular: RRR  Gastrointestinal: Positive bowel sounds, soft, nontender, nondistended  Musculoskeletal: Up sitting in a chair  Psychiatric: Appropriate affect, cooperative  Neurologic: Oriented x 3, strength symmetric in all extremities, Cranial Nerves grossly intact to confrontation, speech clear  Skin: No rashes      Results Reviewed:  LAB RESULTS:      Lab 25  0707 25  0637 25  0920 25  0859 25  0841 25  0026 25  1924 25  1649 25  1539   WBC 13.93* 19.37* 15.61* 15.42* 17.34* 14.61*  --   --  17.53*   HEMOGLOBIN 12.1* 12.4* 11.5* 12.2* 12.0* 13.1  --   --  13.2   HEMATOCRIT 35.8* 37.3* 34.3* 36.0* 35.8* 40.6  --   --  39.4   PLATELETS 456* 436 389 351 300 263  --   --  280   NEUTROS ABS  --   --   --   --   --  12.90*  --   --  15.49*   IMMATURE GRANS (ABS)  --   --   --   --   --  0.07*  --   --  0.09*   LYMPHS ABS  --   --   --   --   --  0.61*  --   --  0.50*   MONOS ABS  --   --   --   --   --  1.00*  --   --  1.43*   EOS ABS  --    --   --   --   --  0.00  --   --  0.00   MCV 98.6* 99.5* 97.4* 99.4* 97.5* 101.8*  --   --  98.0*   CRP  --  0.85*  --   --   --   --   --   --  34.44*   PROCALCITONIN  --  0.11  --   --   --   --   --   --  0.43*   LACTATE  --   --   --   --   --   --  0.9  --  2.1*   HSTROP T  --   --   --   --   --   --   --  15 14         Lab 07/02/25  0707 07/01/25  0637 06/30/25  1600 06/30/25  0920 06/29/25  2339 06/29/25  1650 06/29/25  0859 06/28/25  1729 06/28/25  0841   SODIUM 138 139 139 142 141   < > 141   < > 136   POTASSIUM 4.0 4.0  --  3.5  --   --  3.9  --  4.1   CHLORIDE 104 104  --  109*  --   --  108*  --  104   CO2 28.0 26.0  --  21.2*  --   --  21.7*  --  23.0   ANION GAP 6.0 9.0  --  11.8  --   --  11.3  --  9.0   BUN 10.2 11.3  --  11.0  --   --  11.5  --  14.5   CREATININE 0.67* 0.72*  --  0.64*  --   --  0.67*  --  0.69*   EGFR 103.6 101.4  --  105.1  --   --  103.6  --  102.7   GLUCOSE 86 80  --  111*  --   --  100*  --  140*   CALCIUM 8.3* 8.7  --  8.2*  --   --  8.4*  --  8.7   MAGNESIUM 1.9 2.2  --  1.9  --   --  2.0  --  2.2    < > = values in this interval not displayed.         Lab 06/25/25  1539   TOTAL PROTEIN 7.1   ALBUMIN 3.6   GLOBULIN 3.5   ALT (SGPT) 9   AST (SGOT) 21   BILIRUBIN 0.4   ALK PHOS 94   LIPASE 22         Lab 07/01/25  0637 06/25/25  1649 06/25/25  1539   PROBNP 306.4  --  483.8   HSTROP T  --  15 14             Lab 06/27/25  1003   FOLATE 6.52   VITAMIN B 12 568         Lab 06/25/25  2306 06/25/25  1636   PH, ARTERIAL 7.416 7.507*   PCO2, ARTERIAL 31.7* 24.6*   PO2 ART 58.3* 66.4*   FIO2 32 28   HCO3 ART 20.4 19.5*   BASE EXCESS ART -3.4* -2.1*   CARBOXYHEMOGLOBIN 1.1 1.8     Brief Urine Lab Results  (Last result in the past 365 days)        Color   Clarity   Blood   Leuk Est   Nitrite   Protein   CREAT   Urine HCG        06/25/25 2230 Dark Yellow   Clear   Negative   Small (1+)   Negative   30 mg/dL (1+)                   Microbiology Results Abnormal       Procedure Component  Value - Date/Time    Legionella Antigen, Urine - Urine, Urine, Clean Catch [[id number]]  (Abnormal) Collected: 06/26/25 1753    Lab Status: Final result Specimen: Urine, Clean Catch Updated: 06/27/25 0628     LEGIONELLA ANTIGEN, URINE Positive            SLP FEES - Fiberoptic Endo Eval Swallow  Result Date: 7/1/2025  This procedure was auto-finalized with no dictation required.    XR Chest 1 View  Result Date: 7/1/2025  XR CHEST 1 VW Date of Exam: 7/1/2025 9:50 AM EDT Indication: Check for worsening PNA Comparison: 6/30/2025 Findings: Cardiomediastinal silhouette is within normal limits. Patchy opacity within the right lower lung. This appears slightly increased from prior examination. No pleural effusion or pneumothorax. No evidence of acute osseous abnormalities. Visualized upper abdomen is unremarkable.     Impression: Impression: Patchy opacity within the right lower lung appears slightly increased from prior examination. This may reflect infection or atelectasis. Electronically Signed: Gurmeet Livingston MD  7/1/2025 10:27 AM EDT  Workstation ID: [id number]      Results for orders placed during the hospital encounter of 06/25/25    Adult Transthoracic Echo Complete W/ Cont if Necessary Per Protocol 06/26/2025  4:44 PM    Interpretation Summary    Technically difficult study.    Left ventricular ejection fraction appears to be 51 - 55%.    Normal right ventricular wall thickness and systolic function noted. The right ventricular cavity is mildly dilated.    No hemodynamically significant valvular stenosis or regurgitation    The left atrial cavity is moderately dilated.      Current medications:  Scheduled Meds:acetylcysteine, 4 mL, Nebulization, TID - RT  atorvastatin, 40 mg, Oral, Nightly  azithromycin, 500 mg, Oral, Q24H  baclofen, 5 mg, Oral, TID  budesonide-formoterol, 2 puff, Inhalation, BID - RT  DULoxetine, 60 mg, Oral, Daily  fluticasone, 2 spray, Each Nare, Daily  heparin (porcine), 5,000 Units,  Subcutaneous, Q8H  ipratropium-albuterol, 3 mL, Nebulization, Q4H - RT  lisinopril, 10 mg, Oral, Daily  metoprolol succinate XL, 25 mg, Oral, Daily  nicotine, 1 patch, Transdermal, Q24H  pantoprazole, 40 mg, Oral, Q AM  prazosin, 2 mg, Oral, Nightly  sodium chloride, 10 mL, Intravenous, Q12H      Continuous Infusions:       PRN Meds:.  acetaminophen    ipratropium-albuterol    sodium chloride    [COMPLETED] Insert Peripheral IV **AND** sodium chloride    sodium chloride    sodium chloride    Assessment & Plan   Assessment & Plan     Active Hospital Problems    Diagnosis  POA    **Acute hypoxic respiratory failure [J96.01]  Yes    Ileus [K56.7]  Yes    Sepsis [A41.9]  Yes    Pneumonia [J18.9]  Yes    Hyponatremia [E87.1]  Yes    Abdominal pain [R10.9]  Yes    Bronchitis [J40]  Yes    COPD (chronic obstructive pulmonary disease) [J44.9]  Yes    Tobacco abuse [Z72.0]  Yes    GERD without esophagitis [K21.9]  Yes    Primary hypertension [I10]  Yes    Hyperlipidemia [E78.5]  Yes      Resolved Hospital Problems   No resolved problems to display.        Brief Hospital Course to date:  Oscar Avila is a 64 y.o. male w/ a hx of COPD, ongoing tobacco use, HTN, HLD who presented to the ED w/ c/o shortness of breath and coughing.      Acute hypoxic respiratory failure   Sepsis 2/2 Pneumonia   Bronchitis/Hx of COPD   Tobacco use  Leukocytosis  COPD exacerbation  Legionella pneumonia  Legionnaires disease   met sepsis criteria based on HR 112bpm, temp 102.3, 87% on RA, WBC 17.53, CRP 34.44. procal 0.43, lactic acid 2.1 and + source (PNA per CTA Chest)   Symptomatic from RLL pneumonia and COPD exacerbation evident by increasing oxygen requirements, increased phlegm production and shortness of breath  ABG c/w hypoxia (pH 7.5, CO2 24, O2 66.4) on admission  CTA Chest: Densely consolidating RLL pneumonia, bronchitis, and nonspecific perirenal fat stranding negative for PE  bedside dysphagia screen   Blood cultures NGTD, urine  cultures negative, MRSA swab negative, streptococcal urine antigen negative  Legionella urine antigen positive  respiratory panel negative   S/p doxycycline and Rocephin, given the patient is requiring high flow nasal cannula oxygen would categorize this as moderate to severe disease, QTc is 413, will transition patient to azithromycin as first-line agent  S/p 4 days of IV azithromycin, transitioned to p.o.  Consult infectious disease 7/2/2025  Source is unclear at this time, have asked family to check his HVAC unit at home  s/p NS 2,430ml given in ED  prn and scheduled nebs   continue Flonase, Advair/Wixela (sub Symbicort)  pt/ot and case mgt consult, recommending inpatient rehab  Echo TTE showed EF of 51 to 55% with no diastolic dysfunction noted  Now on high flow nasal cannula oxygen, mucolytic's, flutter valve, chest x-ray concerning for patchy opacity within the RLL appears slightly increased from prior examination  Inflammatory markers are improving overall, this is either atelectasis versus worsening aspiration  Speech therapy consulted, found to have moderate pharyngeal dysphagia, soft to chew texture, chopped, nectar thickened liquids no mixed consistencies  chest physiotherapy, hypertonic saline and Mucomyst, encourage aggressive I-S  Aggressive PT/OT    Hyponatremia - Legionella related - resolved  sodium 127 initially), due to legionnaires disease   no evidence of cirrhosis on CT abdomen with liver enzymes WNL  Echo TTE without heart failure and normal EF  Hyponatremia likely in the setting of Legionella pneumonia, improving with antibiotic coverage     Abdominal pain  Hiccups  likely musculoskeletal in nature; abdominal exam benign, pt c/o pain w/ movement, coughing and w/ hiccups   pt also reported falling on Friday and again Tuesday night- evaluated in ED Tuesday afternoon   CT abdomen pelvis showed densely consolidated RLL pneumonia, nonspecific perirenal fat stranding with probable bland edema,  colonic diverticulosis, prominent small bowel loops in lower abdomen and multiple gas-filled small bowel loops suggesting a generalized ileus rather than bowel obstruction and a small hiatal hernia  Patient on steroids for COPD exacerbation, have significantly helped, will add on baclofen as well  S/p evaluation by SLP, s/p FEES, found to have moderate pharyngeal dysphagia     HTN  HLD     GERD  continue PPI    Expected Discharge Location and Transportation: 6/30/2025  Expected Discharge home  Expected Discharge Date: 7/4/2025; Expected Discharge Time:      VTE Prophylaxis:  Pharmacologic & mechanical VTE prophylaxis orders are present.        AM-PAC 6 Clicks Score (PT): 20 (07/02/25 0943)    CODE STATUS:   Code Status and Medical Interventions: CPR (Attempt to Resuscitate); Full Support   Ordered at: 06/25/25 2015     Code Status (Patient has no pulse and is not breathing):    CPR (Attempt to Resuscitate)     Medical Interventions (Patient has pulse or is breathing):    Full Support       Nina Guadalupe MD  07/02/25

## 2025-07-02 NOTE — CONSULTS
INFECTIOUS DISEASE CONSULT/INITIAL HOSPITAL VISIT    Oscar Avila  1960  [id number]    Date of Consult: 7/2/2025    Admission Date: 6/25/2025      Requesting Provider: No ref. provider found  Evaluating Physician: Perico Silva MD    Reason for Consultation: legionella pneumonia    History of present illness:    Patient is a 64 y.o. male admitted to [facility] 6/25/25 for chills, fever, cough, dyspnea, fever; CTA in ED revealed RLL pneumonia, urine legionella antigen positive.  Patient had tachycardia, fever, elevated procalcitonin and lactic acidosis  hyponatremia on admission; given ceftraixone, doxyyclien and has been transitioned to azithromycin.   We are being involved in care of this patient on hospital day 7.    Patient was on room air but now is on high flow nasal cannula; patient is asking me when he can go home;    According to I/o heena vazquez a cumulative net volume gain of ~8 L over last 3 days        Past Medical History:   Diagnosis Date    Alcohol use     Hyperlipidemia     Hypertension     Neuromuscular disorder        Past Surgical History:   Procedure Laterality Date    CARPAL TUNNEL RELEASE Left     CARPAL TUNNEL RELEASE Right 06/12/2023    Dr. Esa Acosta    FOOT FRACTURE SURGERY Right 01/01/2018    fracture to right foot - has a metal pin on great toe    WISDOM TOOTH EXTRACTION         Family History   Problem Relation Age of Onset    Cancer Mother         small cell cancer    Cancer Father         colon cancer       Social History     Socioeconomic History    Marital status:    Tobacco Use    Smoking status: Every Day     Current packs/day: 1.00     Average packs/day: 1 pack/day for 49.5 years (49.5 ttl pk-yrs)     Types: Cigarettes     Start date: 1976     Passive exposure: Past    Smokeless tobacco: Never    Tobacco comments:     Trying to quit smoking. Would like to go back on the nicotine patch from PCP   Vaping Use    Vaping status: Former    Substances:  "Nicotine    Devices: Disposable, Pre-filled or refillable cartridge   Substance and Sexual Activity    Alcohol use: Not Currently     Comment: Quit drinking. Started Revive Program    Drug use: Not Currently     Comment: alcohol    Sexual activity: Yes     Partners: Female       Allergies   Allergen Reactions    Penicillins GI Intolerance     \" I VOMIT\"          Medication:    Current Facility-Administered Medications:     acetaminophen (TYLENOL) tablet 650 mg, 650 mg, Oral, Q4H PRN, Esperanza Jurado APRN    acetylcysteine (MUCOMYST) 20 % nebulizer solution 4 mL, 4 mL, Nebulization, TID - RT, Shantell Martell MD, 4 mL at 07/02/25 0830    atorvastatin (LIPITOR) tablet 40 mg, 40 mg, Oral, Nightly, Esperanza Jurado APRN, 40 mg at 07/01/25 2020    azithromycin (ZITHROMAX) tablet 500 mg, 500 mg, Oral, Q24H, Shantell Martell MD, 500 mg at 07/02/25 0824    baclofen (LIORESAL) tablet 5 mg, 5 mg, Oral, TID, Shantell Martell MD, 5 mg at 07/02/25 0824    budesonide-formoterol (SYMBICORT) 160-4.5 MCG/ACT inhaler 2 puff, 2 puff, Inhalation, BID - RT, Esperanza Jurado APRN, 2 puff at 07/02/25 0845    DULoxetine (CYMBALTA) DR capsule 60 mg, 60 mg, Oral, Daily, Esperanza Jurado APRN, 60 mg at 07/02/25 0824    fluticasone (FLONASE) 50 MCG/ACT nasal spray 2 spray, 2 spray, Each Nare, Daily, Esperanza Jurado APRN, 2 spray at 07/01/25 1041    heparin (porcine) 5000 UNIT/ML injection 5,000 Units, 5,000 Units, Subcutaneous, Q8H, Nina Guadalupe MD    ipratropium-albuterol (DUO-NEB) nebulizer solution 3 mL, 3 mL, Nebulization, Q4H PRN, Esperanza Jurado APRN, 3 mL at 06/30/25 0202    ipratropium-albuterol (DUO-NEB) nebulizer solution 3 mL, 3 mL, Nebulization, Q4H - RT, Tang, Octavio-Javier, PA-C, 3 mL at 07/02/25 1124    lisinopril (PRINIVIL,ZESTRIL) tablet 10 mg, 10 mg, Oral, Daily, Esperanza Jurado APRN, 10 mg at 07/02/25 0824    metoprolol succinate XL (TOPROL-XL) 24 hr tablet 25 mg, 25 mg, Oral, Daily, Esperanza Jurado APRN, 25 mg " at 07/02/25 0824    nicotine (NICODERM CQ) 21 MG/24HR patch 1 patch, 1 patch, Transdermal, Q24H, Esperanza Jurado APRN, 1 patch at 07/02/25 0825    pantoprazole (PROTONIX) EC tablet 40 mg, 40 mg, Oral, Q AM, Esperanza Jurado APRN, 40 mg at 07/02/25 0518    prazosin (MINIPRESS) capsule 2 mg, 2 mg, Oral, Nightly, Esperanza Jurado APRN, 2 mg at 07/01/25 2020    sodium chloride 0.9 % flush 10 mL, 10 mL, Intravenous, PRN, Mitchell Henriquez MD    [COMPLETED] Insert Peripheral IV, , , Once **AND** sodium chloride 0.9 % flush 10 mL, 10 mL, Intravenous, PRN, Diane Machado, APRYOSHI    sodium chloride 0.9 % flush 10 mL, 10 mL, Intravenous, Q12H, Espernaza Jurado APRN, 10 mL at 07/02/25 0825    sodium chloride 0.9 % flush 10 mL, 10 mL, Intravenous, PRN, Esperanza Jurado APRN    sodium chloride 0.9 % infusion 40 mL, 40 mL, Intravenous, PRN, Esperanza Jurado APRN    Antibiotics:  Anti-Infectives (From admission, onward)      Ordered     Dose/Rate Route Frequency Start Stop    06/29/25 1634  azithromycin (ZITHROMAX) tablet 500 mg        Ordering Provider: Shantell Martell MD    500 mg Oral Every 24 Hours Scheduled 06/30/25 0900 07/10/25 0859    06/27/25 1810  azithromycin (ZITHROMAX) 500 mg in sodium chloride 0.9 % 250 mL IVPB-VTB        Ordering Provider: Shantell Martell MD    500 mg  over 60 Minutes Intravenous Every 24 Hours 06/27/25 1900 06/29/25 1749    06/26/25 1127  doxycycline (MONODOX) capsule 100 mg  Status:  Discontinued        Ordering Provider: Collins Yang, Julián    100 mg Oral Every 12 Hours Scheduled 06/26/25 2100 06/27/25 1810    06/25/25 2145  cefTRIAXone (ROCEPHIN) 1,000 mg in sodium chloride 0.9 % 100 mL MBP        Ordering Provider: Esperanza Jurado APRN    1,000 mg  200 mL/hr over 30 Minutes Intravenous Every 24 Hours 06/26/25 1600 06/29/25 1718    06/25/25 3954  doxycycline (VIBRAMYCIN) 100 mg in sodium chloride 0.9 % 100 mL MBP  Status:  Discontinued        Ordering Provider: Esperanza Jurado APRN    100  mg  over 60 Minutes Intravenous Every 12 Hours 25 0600 25 1127    25 1623  cefTRIAXone (ROCEPHIN) 1 g in sodium chloride 0.9 % 100 mL MBP        Ordering Provider: Diane Machado APRN    1 g  200 mL/hr over 30 Minutes Intravenous Once 25 1633 25 1728    25 1623  doxycycline (VIBRAMYCIN) 100 mg in sodium chloride 0.9 % 100 mL MBP        Ordering Provider: Diane Machado APRN    100 mg  over 60 Minutes Intravenous Once 25 1633 25 1802              Review of Systems:  On admission patient had fatigue, fever, chills, cough, dyspnea, abdominal pain, dizziness.      Physical Exam:   Vital Signs  Temp (24hrs), Av.9 °F (36.6 °C), Min:97.6 °F (36.4 °C), Max:98.2 °F (36.8 °C)    Temp  Min: 97.6 °F (36.4 °C)  Max: 98.2 °F (36.8 °C)  BP  Min: 125/78  Max: 164/82  Pulse  Min: 65  Max: 85  Resp  Min: 16  Max: 21  SpO2  Min: 90 %  Max: 97 %    GENERAL: Awake and alert, in no acute distress.   HEENT: Normocephalic, atraumatic.  PERRL. EOMI. No conjunctival injection. No icterus. Oropharynx clear without evidence of thrush or exudate. No evidence of periodontal disease.      HEART: RRR; No murmur, rubs, gallops.   LUNGS: Clear to auscultation bilaterally without wheezing, rales, rhonchi. Normal respiratory effort. Nonlabored. No dullness.  ABDOMEN: Soft, nontender, nondistended. Positive bowel sounds. No rebound or guarding. NO mass or HSM.  EXT:  No cyanosis, clubbing or edema. No cord.  :  Without Landeros catheter.  MSK: No joint effusions or erythema  SKIN: Warm and dry without cutaneous eruptions on Inspection/palpation.    NEURO: Oriented to PPT.  Motor 5/5 strength  PSYCHIATRIC: Normal insight and judgment. Cooperative with PE    Laboratory Data    Results from last 7 days   Lab Units 25  0707 25  0637 25  0920   WBC 10*3/mm3 13.93* 19.37* 15.61*   HEMOGLOBIN g/dL 12.1* 12.4* 11.5*   HEMATOCRIT % 35.8* 37.3* 34.3*   PLATELETS 10*3/mm3 456* 436 389  "    Results from last 7 days   Lab Units 07/02/25  0707   SODIUM mmol/L 138   POTASSIUM mmol/L 4.0   CHLORIDE mmol/L 104   CO2 mmol/L 28.0   BUN mg/dL 10.2   CREATININE mg/dL 0.67*   GLUCOSE mg/dL 86   CALCIUM mg/dL 8.3*     Results from last 7 days   Lab Units 06/25/25  1539   ALK PHOS U/L 94   BILIRUBIN mg/dL 0.4   ALT (SGPT) U/L 9   AST (SGOT) U/L 21         Results from last 7 days   Lab Units 07/01/25  0637   CRP mg/dL 0.85*     Results from last 7 days   Lab Units 06/25/25  1924   LACTATE mmol/L 0.9             Estimated Creatinine Clearance: 128.4 mL/min (A) (by C-G formula based on SCr of 0.67 mg/dL (L)).      Microbiology:  Blood Culture   Date Value Ref Range Status   06/25/2025 No growth at 5 days  Final   06/25/2025 No growth at 5 days  Final     No results found for: \"BCIDPCR\", \"CXREFLEX\", \"CSFCX\", \"CULTURETIS\"  No results found for: \"CULTURES\", \"HSVCX\", \"URCX\"  No results found for: \"EYECULTURE\", \"GCCX\", \"HSVCULTURE\", \"LABHSV\"  No results found for: \"LEGIONELLA\", \"MRSACX\", \"MUMPSCX\", \"MYCOPLASCX\"  No results found for: \"NOCARDIACX\", \"STOOLCX\"  Urine Culture   Date Value Ref Range Status   06/25/2025 No growth  Final     No results found for: \"VIRALCULTU\", \"WOUNDCX\"        Radiology:  Imaging Results (Last 72 Hours)       Procedure Component Value Units Date/Time    SLP FEES - Fiberoptic Endo Eval Swallow [[id number]] Resulted: 07/01/25 1638     Updated: 07/01/25 1638    Narrative:      This procedure was auto-finalized with no dictation required.    XR Chest 1 View [[id number]] Collected: 07/01/25 1026     Updated: 07/01/25 1030    Narrative:      XR CHEST 1 VW    Date of Exam: 7/1/2025 9:50 AM EDT    Indication: Check for worsening PNA    Comparison: 6/30/2025    Findings:  Cardiomediastinal silhouette is within normal limits. Patchy opacity within the right lower lung. This appears slightly increased from prior examination. No pleural effusion or pneumothorax. No evidence of acute osseous " abnormalities. Visualized upper   abdomen is unremarkable.      Impression:      Impression:  Patchy opacity within the right lower lung appears slightly increased from prior examination. This may reflect infection or atelectasis.        Electronically Signed: Gurmeet Livingston MD    7/1/2025 10:27 AM EDT    Workstation ID: [id number]    XR Chest 1 View [[id number]] Collected: 06/30/25 0432     Updated: 06/30/25 0435    Narrative:      XR CHEST 1 VW    Date of Exam: 6/30/2025 2:57 AM EDT    Indication: hypoxia    Comparison: 6/25/2025.    Findings:  There is no pneumothorax, pleural effusion or focal airspace consolidation. Heart size and pulmonary vasculature appear within normal limits. Regional bones appear intact.      Impression:      Impression:  No acute cardiopulmonary abnormality.          Electronically Signed: Felton Jaime MD    6/30/2025 4:32 AM EDT    Workstation ID: [id number]              Impression:   RLL pneumonia  Urine legionella ag positive  Leukocytosis with neutrophilia  Tobacco use disorder  Acute hypoxic resp failure      PLAN/RECOMMENDATIONS:   Thank you for asking us to see Oscar Avila, I recommend the following:    Azithromycin for 14 days is appropriate    Most common reason for hypoxia at this point would be pulmonary edema; given markedly positive fluid balance would recommend diuresis.    Follow I/o.    Upon worsening may need PE protocol CT, TTE    If COPD exacerbation suspected patien tmay need steroids.    No role for islation    This visit included the following complex service elements:  Complex medical decision-making associated with antimicrobial prescribing.  Managed infection control protocol.         Perico Silva MD  7/2/2025  13:45 EDT

## 2025-07-02 NOTE — PLAN OF CARE
Problem: Adult Inpatient Plan of Care  Goal: Plan of Care Review  Outcome: Progressing  Flowsheets (Taken 7/2/2025 0434)  Progress: no change     Problem: Adult Inpatient Plan of Care  Goal: Absence of Hospital-Acquired Illness or Injury  Intervention: Identify and Manage Fall Risk  Recent Flowsheet Documentation  Taken 7/2/2025 0405 by Joy Reynoso RN  Safety Promotion/Fall Prevention:   activity supervised   assistive device/personal items within reach   safety round/check completed  Taken 7/2/2025 0215 by Joy Reynoso RN  Safety Promotion/Fall Prevention:   activity supervised   assistive device/personal items within reach   safety round/check completed  Taken 7/2/2025 0015 by Joy Reynoso RN  Safety Promotion/Fall Prevention:   activity supervised   assistive device/personal items within reach   safety round/check completed  Taken 7/1/2025 2215 by Joy Reynoso RN  Safety Promotion/Fall Prevention:   activity supervised   assistive device/personal items within reach   safety round/check completed  Taken 7/1/2025 2015 by Joy Reynoso RN  Safety Promotion/Fall Prevention:   activity supervised   assistive device/personal items within reach   safety round/check completed     Problem: Adult Inpatient Plan of Care  Goal: Absence of Hospital-Acquired Illness or Injury  Intervention: Prevent Skin Injury  Recent Flowsheet Documentation  Taken 7/2/2025 0405 by Joy Reynoso RN  Body Position: position changed independently  Skin Protection: incontinence pads utilized  Taken 7/2/2025 0215 by Joy Reynoso RN  Body Position: position changed independently  Skin Protection: incontinence pads utilized  Taken 7/2/2025 0015 by Joy Reynoso RN  Body Position: position changed independently  Skin Protection: incontinence pads utilized  Taken 7/1/2025 2215 by Joy Reynoso RN  Body Position: position changed independently  Skin Protection: incontinence pads utilized  Taken 7/1/2025 2015 by Joy Reynoso RN  Body Position:  position changed independently  Skin Protection: incontinence pads utilized     Problem: Sepsis/Septic Shock  Goal: Absence of Infection Signs and Symptoms  Intervention: Promote Recovery  Recent Flowsheet Documentation  Taken 7/2/2025 0405 by Joy Reynoso RN  Activity Management: activity encouraged  Taken 7/2/2025 0215 by Joy Reynoso RN  Activity Management: activity encouraged  Taken 7/2/2025 0015 by Joy Reynoso RN  Activity Management: activity encouraged  Taken 7/1/2025 2215 by Joy Reynoso RN  Activity Management: activity encouraged  Taken 7/1/2025 2015 by Joy Reynoso RN  Activity Management: activity encouraged     Problem: Fall Injury Risk  Goal: Absence of Fall and Fall-Related Injury  Intervention: Promote Injury-Free Environment  Recent Flowsheet Documentation  Taken 7/2/2025 0405 by Joy Reynoso RN  Safety Promotion/Fall Prevention:   activity supervised   assistive device/personal items within reach   safety round/check completed  Taken 7/2/2025 0215 by Joy Reynoso RN  Safety Promotion/Fall Prevention:   activity supervised   assistive device/personal items within reach   safety round/check completed  Taken 7/2/2025 0015 by Joy Reynoso RN  Safety Promotion/Fall Prevention:   activity supervised   assistive device/personal items within reach   safety round/check completed  Taken 7/1/2025 2215 by Joy Reynoso RN  Safety Promotion/Fall Prevention:   activity supervised   assistive device/personal items within reach   safety round/check completed  Taken 7/1/2025 2015 by Joy Reynoso RN  Safety Promotion/Fall Prevention:   activity supervised   assistive device/personal items within reach   safety round/check completed   Goal Outcome Evaluation:           Progress: no change

## 2025-07-03 ENCOUNTER — APPOINTMENT (OUTPATIENT)
Dept: GENERAL RADIOLOGY | Facility: HOSPITAL | Age: 65
End: 2025-07-03
Payer: MEDICARE

## 2025-07-03 ENCOUNTER — READMISSION MANAGEMENT (OUTPATIENT)
Dept: CALL CENTER | Facility: HOSPITAL | Age: 65
End: 2025-07-03
Payer: MEDICARE

## 2025-07-03 VITALS
HEIGHT: 71 IN | TEMPERATURE: 98.2 F | RESPIRATION RATE: 18 BRPM | WEIGHT: 182 LBS | DIASTOLIC BLOOD PRESSURE: 71 MMHG | OXYGEN SATURATION: 86 % | HEART RATE: 118 BPM | SYSTOLIC BLOOD PRESSURE: 124 MMHG | BODY MASS INDEX: 25.48 KG/M2

## 2025-07-03 PROBLEM — A48.1 LEGIONELLA PNEUMONIA: Status: ACTIVE | Noted: 2025-07-03

## 2025-07-03 LAB
ANION GAP SERPL CALCULATED.3IONS-SCNC: 6 MMOL/L (ref 5–15)
BUN SERPL-MCNC: 11.4 MG/DL (ref 8–23)
BUN/CREAT SERPL: 17.3 (ref 7–25)
CALCIUM SPEC-SCNC: 8.3 MG/DL (ref 8.6–10.5)
CHLORIDE SERPL-SCNC: 107 MMOL/L (ref 98–107)
CO2 SERPL-SCNC: 27 MMOL/L (ref 22–29)
CREAT SERPL-MCNC: 0.66 MG/DL (ref 0.76–1.27)
DEPRECATED RDW RBC AUTO: 46.4 FL (ref 37–54)
EGFRCR SERPLBLD CKD-EPI 2021: 104.1 ML/MIN/1.73
ERYTHROCYTE [DISTWIDTH] IN BLOOD BY AUTOMATED COUNT: 12.9 % (ref 12.3–15.4)
GLUCOSE SERPL-MCNC: 88 MG/DL (ref 65–99)
HCT VFR BLD AUTO: 35.1 % (ref 37.5–51)
HGB BLD-MCNC: 11.9 G/DL (ref 13–17.7)
MAGNESIUM SERPL-MCNC: 2.1 MG/DL (ref 1.6–2.4)
MCH RBC QN AUTO: 33.7 PG (ref 26.6–33)
MCHC RBC AUTO-ENTMCNC: 33.9 G/DL (ref 31.5–35.7)
MCV RBC AUTO: 99.4 FL (ref 79–97)
PLATELET # BLD AUTO: 480 10*3/MM3 (ref 140–450)
PMV BLD AUTO: 9.4 FL (ref 6–12)
POTASSIUM SERPL-SCNC: 4 MMOL/L (ref 3.5–5.2)
RBC # BLD AUTO: 3.53 10*6/MM3 (ref 4.14–5.8)
SODIUM SERPL-SCNC: 140 MMOL/L (ref 136–145)
WBC NRBC COR # BLD AUTO: 16.57 10*3/MM3 (ref 3.4–10.8)

## 2025-07-03 PROCEDURE — 97530 THERAPEUTIC ACTIVITIES: CPT

## 2025-07-03 PROCEDURE — 94669 MECHANICAL CHEST WALL OSCILL: CPT

## 2025-07-03 PROCEDURE — 85027 COMPLETE CBC AUTOMATED: CPT

## 2025-07-03 PROCEDURE — 71045 X-RAY EXAM CHEST 1 VIEW: CPT

## 2025-07-03 PROCEDURE — 92526 ORAL FUNCTION THERAPY: CPT

## 2025-07-03 PROCEDURE — 80048 BASIC METABOLIC PNL TOTAL CA: CPT

## 2025-07-03 PROCEDURE — 97535 SELF CARE MNGMENT TRAINING: CPT

## 2025-07-03 PROCEDURE — 94799 UNLISTED PULMONARY SVC/PX: CPT

## 2025-07-03 PROCEDURE — 83735 ASSAY OF MAGNESIUM: CPT

## 2025-07-03 PROCEDURE — 25010000002 HEPARIN (PORCINE) PER 1000 UNITS: Performed by: FAMILY MEDICINE

## 2025-07-03 PROCEDURE — 94761 N-INVAS EAR/PLS OXIMETRY MLT: CPT

## 2025-07-03 PROCEDURE — 25010000002 FUROSEMIDE PER 20 MG: Performed by: FAMILY MEDICINE

## 2025-07-03 PROCEDURE — 99239 HOSP IP/OBS DSCHRG MGMT >30: CPT | Performed by: FAMILY MEDICINE

## 2025-07-03 RX ORDER — FUROSEMIDE 10 MG/ML
20 INJECTION INTRAMUSCULAR; INTRAVENOUS EVERY 12 HOURS
Status: DISCONTINUED | OUTPATIENT
Start: 2025-07-03 | End: 2025-07-03 | Stop reason: HOSPADM

## 2025-07-03 RX ORDER — AZITHROMYCIN 500 MG/1
500 TABLET, FILM COATED ORAL
Qty: 6 TABLET | Refills: 0 | Status: SHIPPED | OUTPATIENT
Start: 2025-07-04 | End: 2025-07-10

## 2025-07-03 RX ADMIN — DULOXETINE 60 MG: 60 CAPSULE, DELAYED RELEASE ORAL at 10:06

## 2025-07-03 RX ADMIN — HEPARIN SODIUM 5000 UNITS: 5000 INJECTION INTRAVENOUS; SUBCUTANEOUS at 05:23

## 2025-07-03 RX ADMIN — METOPROLOL SUCCINATE 25 MG: 25 TABLET, EXTENDED RELEASE ORAL at 10:10

## 2025-07-03 RX ADMIN — IPRATROPIUM BROMIDE AND ALBUTEROL SULFATE 3 ML: 2.5; .5 SOLUTION RESPIRATORY (INHALATION) at 02:54

## 2025-07-03 RX ADMIN — BACLOFEN 5 MG: 10 TABLET ORAL at 10:06

## 2025-07-03 RX ADMIN — ACETYLCYSTEINE 4 ML: 200 SOLUTION ORAL; RESPIRATORY (INHALATION) at 11:47

## 2025-07-03 RX ADMIN — BUDESONIDE AND FORMOTEROL FUMARATE DIHYDRATE 2 PUFF: 160; 4.5 AEROSOL RESPIRATORY (INHALATION) at 07:26

## 2025-07-03 RX ADMIN — AZITHROMYCIN DIHYDRATE 500 MG: 250 TABLET ORAL at 10:06

## 2025-07-03 RX ADMIN — IPRATROPIUM BROMIDE AND ALBUTEROL SULFATE 3 ML: 2.5; .5 SOLUTION RESPIRATORY (INHALATION) at 07:25

## 2025-07-03 RX ADMIN — FUROSEMIDE 20 MG: 10 INJECTION, SOLUTION INTRAMUSCULAR; INTRAVENOUS at 10:06

## 2025-07-03 RX ADMIN — IPRATROPIUM BROMIDE AND ALBUTEROL SULFATE 3 ML: 2.5; .5 SOLUTION RESPIRATORY (INHALATION) at 11:47

## 2025-07-03 RX ADMIN — IPRATROPIUM BROMIDE AND ALBUTEROL SULFATE 3 ML: 2.5; .5 SOLUTION RESPIRATORY (INHALATION) at 16:09

## 2025-07-03 RX ADMIN — NICOTINE 1 PATCH: 21 PATCH, EXTENDED RELEASE TRANSDERMAL at 10:07

## 2025-07-03 RX ADMIN — PANTOPRAZOLE SODIUM 40 MG: 40 TABLET, DELAYED RELEASE ORAL at 05:24

## 2025-07-03 RX ADMIN — ACETYLCYSTEINE 4 ML: 200 SOLUTION ORAL; RESPIRATORY (INHALATION) at 07:25

## 2025-07-03 RX ADMIN — FLUTICASONE PROPIONATE 2 SPRAY: 50 SPRAY, METERED NASAL at 10:07

## 2025-07-03 NOTE — PROGRESS NOTES
INFECTIOUS DISEASE CONSULT/INITIAL HOSPITAL VISIT    Oscar Avila  1960  [id number]    Date of Consult: 7/3/2025    Admission Date: 6/25/2025      Requesting Provider: No ref. provider found  Evaluating Physician: Perico Silva MD    Reason for Consultation: legionella pneumonia    History of present illness:    Patient is a 64 y.o. male admitted to [facility] 6/25/25 for chills, fever, cough, dyspnea, fever; CTA in ED revealed RLL pneumonia, urine legionella antigen positive.  Patient had tachycardia, fever, elevated procalcitonin and lactic acidosis  hyponatremia on admission; given ceftraixone, doxyycline and has been transitioned to azithromycin.   We are being involved in care of this patient on hospital day 7.    Patient was on room air but now is on high flow nasal cannula; patient is asking me when he can go home;    According to I/o heena vazquez a cumulative net volume gain of ~8 L over last 3 days    7/3/25; o2 2 L 02 with o2 sats ~ 91% given lasix this morning reports breathing better;  afebrile, wants to go home    Past Medical History:   Diagnosis Date    Alcohol use     Hyperlipidemia     Hypertension     Neuromuscular disorder        Past Surgical History:   Procedure Laterality Date    CARPAL TUNNEL RELEASE Left     CARPAL TUNNEL RELEASE Right 06/12/2023    Dr. Esa Acosta    FOOT FRACTURE SURGERY Right 01/01/2018    fracture to right foot - has a metal pin on great toe    WISDOM TOOTH EXTRACTION         Family History   Problem Relation Age of Onset    Cancer Mother         small cell cancer    Cancer Father         colon cancer       Social History     Socioeconomic History    Marital status:    Tobacco Use    Smoking status: Every Day     Current packs/day: 1.00     Average packs/day: 1 pack/day for 49.5 years (49.5 ttl pk-yrs)     Types: Cigarettes     Start date: 1976     Passive exposure: Past    Smokeless tobacco: Never    Tobacco comments:     Trying to quit  "smoking. Would like to go back on the nicotine patch from PCP   Vaping Use    Vaping status: Former    Substances: Nicotine    Devices: Disposable, Pre-filled or refillable cartridge   Substance and Sexual Activity    Alcohol use: Not Currently     Comment: Quit drinking. Started Revive Program    Drug use: Not Currently     Comment: alcohol    Sexual activity: Yes     Partners: Female       Allergies   Allergen Reactions    Penicillins GI Intolerance     \" I VOMIT\"          Medication:    Current Facility-Administered Medications:     acetaminophen (TYLENOL) tablet 650 mg, 650 mg, Oral, Q4H PRN, Esperanza Jurado APRN    acetylcysteine (MUCOMYST) 20 % nebulizer solution 4 mL, 4 mL, Nebulization, TID - RT, Shantell Martell MD, 4 mL at 07/03/25 1147    atorvastatin (LIPITOR) tablet 40 mg, 40 mg, Oral, Nightly, Esperanza Jurado APRN, 80 mg at 07/02/25 2036    azithromycin (ZITHROMAX) tablet 500 mg, 500 mg, Oral, Q24H, Shantell Martell MD, 500 mg at 07/03/25 1006    baclofen (LIORESAL) tablet 5 mg, 5 mg, Oral, TID, Shantell Martell MD, 5 mg at 07/03/25 1006    budesonide-formoterol (SYMBICORT) 160-4.5 MCG/ACT inhaler 2 puff, 2 puff, Inhalation, BID - RT, Esperanza Jurado APRN, 2 puff at 07/03/25 0726    DULoxetine (CYMBALTA) DR capsule 60 mg, 60 mg, Oral, Daily, Esperanza Jurado APRN, 60 mg at 07/03/25 1006    fluticasone (FLONASE) 50 MCG/ACT nasal spray 2 spray, 2 spray, Each Nare, Daily, Esperanza Jurado APRN, 2 spray at 07/03/25 1007    furosemide (LASIX) injection 20 mg, 20 mg, Intravenous, Q12H, Nina Guadalupe MD, 20 mg at 07/03/25 1006    heparin (porcine) 5000 UNIT/ML injection 5,000 Units, 5,000 Units, Subcutaneous, Q8H, Nina Guadalupe MD, 5,000 Units at 07/03/25 0523    ipratropium-albuterol (DUO-NEB) nebulizer solution 3 mL, 3 mL, Nebulization, Q4H PRN, Esperanza Jurado APRN, 3 mL at 06/30/25 0202    ipratropium-albuterol (DUO-NEB) nebulizer solution 3 mL, 3 mL, Nebulization, Q4H - RT, Clyde, " DAVID Gayle, 3 mL at 07/03/25 1147    metoprolol succinate XL (TOPROL-XL) 24 hr tablet 25 mg, 25 mg, Oral, Daily, Esperanza Jurado APRN, 25 mg at 07/03/25 1010    nicotine (NICODERM CQ) 21 MG/24HR patch 1 patch, 1 patch, Transdermal, Q24H, Esperanza Jurado, APRN, 1 patch at 07/03/25 1007    pantoprazole (PROTONIX) EC tablet 40 mg, 40 mg, Oral, Q AM, Esperanza Jurado, APRN, 40 mg at 07/03/25 0524    prazosin (MINIPRESS) capsule 2 mg, 2 mg, Oral, Nightly, Esperanza Jurado APRN, 2 mg at 07/02/25 2035    sodium chloride 0.9 % flush 10 mL, 10 mL, Intravenous, PRN, Mitchell Henriquez MD    [COMPLETED] Insert Peripheral IV, , , Once **AND** sodium chloride 0.9 % flush 10 mL, 10 mL, Intravenous, PRN, Yony Machadoa V, APRN    sodium chloride 0.9 % flush 10 mL, 10 mL, Intravenous, Q12H, Phill Juradoa, APRN, 10 mL at 07/02/25 2035    sodium chloride 0.9 % flush 10 mL, 10 mL, Intravenous, PRN, Esperanza Jurado, APRN    sodium chloride 0.9 % infusion 40 mL, 40 mL, Intravenous, PRN, Phill Juradoa, APRN    Antibiotics:  Anti-Infectives (From admission, onward)      Ordered     Dose/Rate Route Frequency Start Stop    06/29/25 1634  azithromycin (ZITHROMAX) tablet 500 mg        Ordering Provider: Shantell Martell MD    500 mg Oral Every 24 Hours Scheduled 06/30/25 0900 07/10/25 0859    06/27/25 1810  azithromycin (ZITHROMAX) 500 mg in sodium chloride 0.9 % 250 mL IVPB-VTB        Ordering Provider: Shantell Martell MD    500 mg  over 60 Minutes Intravenous Every 24 Hours 06/27/25 1900 06/29/25 1749    06/26/25 1127  doxycycline (MONODOX) capsule 100 mg  Status:  Discontinued        Ordering Provider: Collins Yang, PharmD    100 mg Oral Every 12 Hours Scheduled 06/26/25 2100 06/27/25 1810    06/25/25 2145  cefTRIAXone (ROCEPHIN) 1,000 mg in sodium chloride 0.9 % 100 mL MBP        Ordering Provider: Esperanza Jurado APRN    1,000 mg  200 mL/hr over 30 Minutes Intravenous Every 24 Hours 06/26/25 1600 06/29/25 1718    06/25/25  2145  doxycycline (VIBRAMYCIN) 100 mg in sodium chloride 0.9 % 100 mL MBP  Status:  Discontinued        Ordering Provider: Esperanza Jurado APRN    100 mg  over 60 Minutes Intravenous Every 12 Hours 25 0600 25 1127    25 1623  cefTRIAXone (ROCEPHIN) 1 g in sodium chloride 0.9 % 100 mL MBP        Ordering Provider: Diane Machado APRN    1 g  200 mL/hr over 30 Minutes Intravenous Once 25 1633 25 1728    25 1623  doxycycline (VIBRAMYCIN) 100 mg in sodium chloride 0.9 % 100 mL MBP        Ordering Provider: Diane Machado APRN    100 mg  over 60 Minutes Intravenous Once 25 1633 25 1802              Review of Systems:  On admission patient had fatigue, fever, chills, cough, dyspnea, abdominal pain, dizziness.      Physical Exam:   Vital Signs  Temp (24hrs), Av.8 °F (36.6 °C), Min:97.3 °F (36.3 °C), Max:98.2 °F (36.8 °C)    Temp  Min: 97.3 °F (36.3 °C)  Max: 98.2 °F (36.8 °C)  BP  Min: 122/75  Max: 130/76  Pulse  Min: 69  Max: 118  Resp  Min: 14  Max: 19  SpO2  Min: 86 %  Max: 96 %    GENERAL: Awake and alert, in no acute distress.   HEENT: Normocephalic, atraumatic.  PERRL. EOMI. No conjunctival injection. No icterus. Oropharynx clear without evidence of thrush or exudate. No evidence of periodontal disease.      HEART: RRR; No murmu  LUNGS: Clear to auscultation bilaterally   ABDOMEN: Soft, nontender, nondistended. Positive bowel sounds. No rebound or guarding. NO mass or HSM.  EXT:  No cyanosis, clubbing or edema. No cord.  :  Without Landeros catheter.  MSK: No joint effusions or erythema  SKIN: Warm and dry without cutaneous eruptions on Inspection/palpation.    NEURO: Oriented to PPT.  Motor 5/5 strength  PSYCHIATRIC: Normal insight and judgment. Cooperative with PE    Laboratory Data    Results from last 7 days   Lab Units 25  0635 25  0707 25  0637   WBC 10*3/mm3 16.57* 13.93* 19.37*   HEMOGLOBIN g/dL 11.9* 12.1* 12.4*   HEMATOCRIT % 35.1* 35.8*  "37.3*   PLATELETS 10*3/mm3 480* 456* 436     Results from last 7 days   Lab Units 07/03/25  0635   SODIUM mmol/L 140   POTASSIUM mmol/L 4.0   CHLORIDE mmol/L 107   CO2 mmol/L 27.0   BUN mg/dL 11.4   CREATININE mg/dL 0.66*   GLUCOSE mg/dL 88   CALCIUM mg/dL 8.3*               Results from last 7 days   Lab Units 07/01/25  0637   CRP mg/dL 0.85*                   Estimated Creatinine Clearance: 130.4 mL/min (A) (by C-G formula based on SCr of 0.66 mg/dL (L)).      Microbiology:  Blood Culture   Date Value Ref Range Status   06/25/2025 No growth at 5 days  Final   06/25/2025 No growth at 5 days  Final     No results found for: \"BCIDPCR\", \"CXREFLEX\", \"CSFCX\", \"CULTURETIS\"  No results found for: \"CULTURES\", \"HSVCX\", \"URCX\"  No results found for: \"EYECULTURE\", \"GCCX\", \"HSVCULTURE\", \"LABHSV\"  No results found for: \"LEGIONELLA\", \"MRSACX\", \"MUMPSCX\", \"MYCOPLASCX\"  No results found for: \"NOCARDIACX\", \"STOOLCX\"  Urine Culture   Date Value Ref Range Status   06/25/2025 No growth  Final     No results found for: \"VIRALCULTU\", \"WOUNDCX\"        Radiology:  Imaging Results (Last 72 Hours)       Procedure Component Value Units Date/Time    XR Chest 1 View [[id number]] Collected: 07/03/25 0928     Updated: 07/03/25 0933    Narrative:      XR CHEST 1 VW    Date of Exam: 7/3/2025 8:41 AM EDT    Indication: pna and hypoxia    Comparison: 7/1/2025    Findings:  Right basilar pneumonia appears mildly improved. Left basilar atelectasis or pneumonia has resolved. Upper lungs appear clear. No pneumothorax or edema is seen. The heart and vasculature appear normal in size.      Impression:      Impression:  Improving right basilar pneumonia with moderate remaining disease. Interval clearing of the left lung base. No new chest pathology is seen.      Electronically Signed: Cosmo West MD    7/3/2025 9:30 AM EDT    Workstation ID: [id number]    SLP FEES - Fiberoptic Endo Eval Swallow [[id number]] Resulted: 07/01/25 1638     Updated: 07/01/25 1638    " Narrative:      This procedure was auto-finalized with no dictation required.    XR Chest 1 View [[id number]] Collected: 07/01/25 1026     Updated: 07/01/25 1030    Narrative:      XR CHEST 1 VW    Date of Exam: 7/1/2025 9:50 AM EDT    Indication: Check for worsening PNA    Comparison: 6/30/2025    Findings:  Cardiomediastinal silhouette is within normal limits. Patchy opacity within the right lower lung. This appears slightly increased from prior examination. No pleural effusion or pneumothorax. No evidence of acute osseous abnormalities. Visualized upper   abdomen is unremarkable.      Impression:      Impression:  Patchy opacity within the right lower lung appears slightly increased from prior examination. This may reflect infection or atelectasis.        Electronically Signed: Gurmeet Livingston MD    7/1/2025 10:27 AM EDT    Workstation ID: [id number]              Impression:   RLL pneumonia  Urine legionella ag positive  Leukocytosis with neutrophilia  Tobacco use disorder  Acute hypoxic resp failure      PLAN/RECOMMENDATIONS:   Thank you for asking us to see Oscar Avila, I recommend the following:    Azithromycin for 14 days is appropriate    I have independently reviewed CXR from 7/1, 7/3    Agree with diuresis to euvolemia    Can f/u with me in 1-2 weeks  Upon worsening may need PE protocol CT, TTE    If COPD exacerbation suspected patien tmay need steroids.    No role for islation    This visit included the following complex service elements:  Complex medical decision-making associated with antimicrobial prescribing.  Managed infection control protocol.       D/w hospitalist  Dc/cm time 30 minutes  Perico Silva MD  7/3/2025  14:52 EDT

## 2025-07-03 NOTE — PLAN OF CARE
Goal Outcome Evaluation:  Plan of Care Reviewed With: patient        Progress: improving  Outcome Evaluation: Pt with improvements this session in ADL performance and functional mobility. Ambulated >200 ft with no AD. No SOA reported on 2L O2. UBD/LBD complete with mod I for D/C home. Recommend home with assist at D/C.    Anticipated Discharge Disposition (OT): home, home with assist

## 2025-07-03 NOTE — OUTREACH NOTE
Prep Survey      Flowsheet Row Responses   [facility] patient discharged from? [place]   Is LACE score < 7 ? No   Eligibility [facility]   Date of Admission 06/25/25   Date of Discharge 07/03/25   Discharge Disposition Home-Health Care [facility]   Discharge diagnosis *Acute hypoxic respiratory failure (   Does the patient have one of the following disease processes/diagnoses(primary or secondary)? Other   Does the patient have Home health ordered? No   Is there a DME ordered? Yes   Prep survey completed? Yes            TORITO WRIGHT - Registered Nurse

## 2025-07-03 NOTE — DISCHARGE SUMMARY
[facility] Medicine Services  DISCHARGE SUMMARY    Patient Name: Oscar Avila  : 1960  MRN: [id number]    Date of Admission: 2025  3:43 PM  Date of Discharge:  25    Primary Care Physician: Concepción Quigley DO    Consults       Date and Time Order Name Status Description    2025 10:32 AM Inpatient Infectious Diseases Consult Completed             Hospital Course     Presenting Problem: PNA    Active Hospital Problems    Diagnosis  POA    **Acute hypoxic respiratory failure [J96.01]  Yes    Legionella pneumonia [A48.1]  Yes    Ileus [K56.7]  Yes    Sepsis [A41.9]  Yes    Pneumonia [J18.9]  Yes    Hyponatremia [E87.1]  Yes    Abdominal pain [R10.9]  Yes    Bronchitis [J40]  Yes    COPD (chronic obstructive pulmonary disease) [J44.9]  Yes    Tobacco abuse [Z72.0]  Yes    GERD without esophagitis [K21.9]  Yes    Primary hypertension [I10]  Yes    Hyperlipidemia [E78.5]  Yes      Resolved Hospital Problems   No resolved problems to display.          Hospital Course:  Oscar Avila is a 64 y.o. male  w/ a hx of COPD, ongoing tobacco use, HTN, HLD who presented to the ED w/ c/o shortness of breath and coughing. He was found to have lesion pneumonia.  He required high flow nasal cannula for an extended period but improved after diuresis.     Acute hypoxic respiratory failure   Sepsis 2/2 Pneumonia   Bronchitis/Hx of COPD   Tobacco use  Leukocytosis  COPD exacerbation  Legionella pneumonia  Legionnaires disease   Dysphagia  met sepsis criteria based on HR 112bpm, temp 102.3, 87% on RA, WBC 17.53, CRP 34.44. procal 0.43, lactic acid 2.1 and + source (PNA per CTA Chest)   Symptomatic from RLL pneumonia and COPD exacerbation evident by increasing oxygen requirements, increased phlegm production and shortness of breath  ABG c/w hypoxia (pH 7.5, CO2 24, O2 66.4) on admission  CTA Chest: Densely consolidating RLL pneumonia, bronchitis, and nonspecific perirenal fat  stranding negative for PE  bedside dysphagia screen   Blood cultures NGTD, urine cultures negative, MRSA swab negative, streptococcal urine antigen negative  Legionella urine antigen positive  respiratory panel negative   S/p doxycycline and Rocephin, given the patient is requiring high flow nasal cannula oxygen would categorize this as moderate to severe disease, QTc is 413, will transition patient to azithromycin as first-line agent  S/p 4 days of IV azithromycin, transitioned to p.o.  Consult infectious disease 7/2/2025 appreciate their recommendations  Source is unclear at this time, have asked family to check his HVAC unit at home  s/p NS 2,430ml given in ED  prn and scheduled nebs   continue Flonase, Advair/Wixela (sub Symbicort)  pt/ot and case mgt consult, recommending inpatient rehab  Echo TTE showed EF of 51 to 55% with no diastolic dysfunction noted  Required high flow nasal cannula oxygen, mucolytic's, flutter valve, chest x-ray concerning for patchy opacity within the RLL appears slightly increased from prior examination  Inflammatory markers are improving overall, this is either atelectasis versus worsening aspiration  Speech therapy consulted, found to have moderate pharyngeal dysphagia, soft to chew texture, chopped, nectar thickened liquids no mixed consistencies  chest physiotherapy, hypertonic saline and Mucomyst, encourage aggressive I-S  Will need outpatient speech therapy for dysphagia; diet modification with nectar thickened liquids, mechanical soft and no mixed consistencies pending reassessment by speech therapy as an outpatient  Sending home with O2 via nasal cannula at 2 L at all times     Hyponatremia - Legionella related - resolved  sodium 127 initially), due to legionnaires disease   no evidence of cirrhosis on CT abdomen with liver enzymes WNL  Echo TTE without heart failure and normal EF  Hyponatremia likely in the setting of Legionella pneumonia, improving with antibiotic coverage      Abdominal pain  Hiccups  likely musculoskeletal in nature; abdominal exam benign, pt c/o pain w/ movement, coughing and w/ hiccups   pt also reported falling on Friday and again Tuesday night- evaluated in ED Tuesday afternoon   CT abdomen pelvis showed densely consolidated RLL pneumonia, nonspecific perirenal fat stranding with probable bland edema, colonic diverticulosis, prominent small bowel loops in lower abdomen and multiple gas-filled small bowel loops suggesting a generalized ileus rather than bowel obstruction and a small hiatal hernia  Patient on steroids for COPD exacerbation, have significantly helped, will add on baclofen as well  S/p evaluation by SLP, s/p FEES, found to have moderate pharyngeal dysphagia     HTN  HLD     GERD  continue PPI      Discharge Follow Up Recommendations for outpatient labs/diagnostics:  Follow-up with PCP in 1 week to review medications  Follow-up with speech therapy for continued outpatient therapy    Day of Discharge     HPI:   Patient doing better has had significant urine output after IV Lasix.  Now on 2 L nasal cannula and feels comfortable going home today.    Review of Systems  Tolerating p.o. on modified diet.    Vital Signs:   Temp:  [97.3 °F (36.3 °C)-98.2 °F (36.8 °C)] 98.2 °F (36.8 °C)  Heart Rate:  [] 118  Resp:  [14-19] 18  BP: (122-130)/(71-76) 124/71  Flow (L/min) (Oxygen Therapy):  [2-6] 2      Physical Exam:  Constitutional: No acute distress, awake, alert  HENT: NCAT, mucous membranes moist  Respiratory: Decreased breath sounds bilaterally, respiratory effort normal   Cardiovascular: RRR  Gastrointestinal: Positive bowel sounds, soft, nontender, nondistended  Musculoskeletal: No bilateral ankle edema  Psychiatric: Appropriate affect, cooperative  Neurologic: Oriented x 3, strength symmetric in all extremities, Cranial Nerves grossly intact to confrontation, speech clear  Skin: No rashes      Pertinent  and/or Most Recent Results     LAB RESULTS:       Lab 07/03/25  0635 07/02/25  0707 07/01/25  0637 06/30/25  0920 06/29/25  0859   WBC 16.57* 13.93* 19.37* 15.61* 15.42*   HEMOGLOBIN 11.9* 12.1* 12.4* 11.5* 12.2*   HEMATOCRIT 35.1* 35.8* 37.3* 34.3* 36.0*   PLATELETS 480* 456* 436 389 351   MCV 99.4* 98.6* 99.5* 97.4* 99.4*   CRP  --   --  0.85*  --   --    PROCALCITONIN  --   --  0.11  --   --          Lab 07/03/25  0635 07/02/25  0707 07/01/25  0637 06/30/25  1600 06/30/25  0920 06/29/25  1650 06/29/25  0859   SODIUM 140 138 139 139 142   < > 141   POTASSIUM 4.0 4.0 4.0  --  3.5  --  3.9   CHLORIDE 107 104 104  --  109*  --  108*   CO2 27.0 28.0 26.0  --  21.2*  --  21.7*   ANION GAP 6.0 6.0 9.0  --  11.8  --  11.3   BUN 11.4 10.2 11.3  --  11.0  --  11.5   CREATININE 0.66* 0.67* 0.72*  --  0.64*  --  0.67*   EGFR 104.1 103.6 101.4  --  105.1  --  103.6   GLUCOSE 88 86 80  --  111*  --  100*   CALCIUM 8.3* 8.3* 8.7  --  8.2*  --  8.4*   MAGNESIUM 2.1 1.9 2.2  --  1.9  --  2.0    < > = values in this interval not displayed.             Lab 07/01/25  0637   PROBNP 306.4             Lab 06/27/25  1003   FOLATE 6.52   VITAMIN B 12 568         Brief Urine Lab Results  (Last result in the past 365 days)        Color   Clarity   Blood   Leuk Est   Nitrite   Protein   CREAT   Urine HCG        06/25/25 2230 Dark Yellow   Clear   Negative   Small (1+)   Negative   30 mg/dL (1+)                 Microbiology Results (last 10 days)       Procedure Component Value - Date/Time    S. Pneumo Ag Urine or CSF - Urine, Urine, Clean Catch [[id number]]  (Normal) Collected: 06/26/25 1753    Lab Status: Final result Specimen: Urine, Clean Catch Updated: 06/27/25 0628     Strep Pneumo Ag Negative    Legionella Antigen, Urine - Urine, Urine, Clean Catch [[id number]]  (Abnormal) Collected: 06/26/25 1753    Lab Status: Final result Specimen: Urine, Clean Catch Updated: 06/27/25 0628     LEGIONELLA ANTIGEN, URINE Positive    MRSA Screen, PCR (Inpatient) - Swab, Nares [[id number]]  (Normal)  Collected: 06/26/25 1531    Lab Status: Final result Specimen: Swab from Nares Updated: 06/26/25 1654     MRSA PCR Negative    Narrative:      The negative predictive value of this diagnostic test is high and should only be used to consider de-escalating anti-MRSA therapy. A positive result may indicate colonization with MRSA and must be correlated clinically.  MRSA Negative    Urine Culture - Urine, Urine, Clean Catch [[id number]]  (Normal) Collected: 06/25/25 2230    Lab Status: Final result Specimen: Urine, Clean Catch Updated: 06/27/25 1042     Urine Culture No growth    Blood Culture - Blood, Arm, Left [[id number]]  (Normal) Collected: 06/25/25 1650    Lab Status: Final result Specimen: Blood from Arm, Left Updated: 06/30/25 1900     Blood Culture No growth at 5 days    Blood Culture - Blood, Arm, Left [[id number]]  (Normal) Collected: 06/25/25 1650    Lab Status: Final result Specimen: Blood from Arm, Left Updated: 06/30/25 1900     Blood Culture No growth at 5 days    Narrative:      Less than seven (7) mL's of blood was collected.  Insufficient quantity may yield false negative results.    Respiratory Panel PCR w/COVID-19(SARS-CoV-2) PATY/JACKIE/HALLIE/PAD/COR/FLORINA In-House, NP Swab in UTM/VTM, 2 HR TAT - Swab, Nasopharynx [[id number]]  (Normal) Collected: 06/25/25 1647    Lab Status: Final result Specimen: Swab from Nasopharynx Updated: 06/25/25 1755     ADENOVIRUS, PCR Not Detected     Coronavirus 229E Not Detected     Coronavirus HKU1 Not Detected     Coronavirus NL63 Not Detected     Coronavirus OC43 Not Detected     COVID19 Not Detected     Human Metapneumovirus Not Detected     Human Rhinovirus/Enterovirus Not Detected     Influenza A PCR Not Detected     Influenza B PCR Not Detected     Parainfluenza Virus 1 Not Detected     Parainfluenza Virus 2 Not Detected     Parainfluenza Virus 3 Not Detected     Parainfluenza Virus 4 Not Detected     RSV, PCR Not Detected     Bordetella pertussis pcr Not Detected      Bordetella parapertussis PCR Not Detected     Chlamydophila pneumoniae PCR Not Detected     Mycoplasma pneumo by PCR Not Detected    Narrative:      In the setting of a positive respiratory panel with a viral infection PLUS a negative procalcitonin without other underlying concern for bacterial infection, consider observing off antibiotics or discontinuation of antibiotics and continue supportive care. If the respiratory panel is positive for atypical bacterial infection (Bordetella pertussis, Chlamydophila pneumoniae, or Mycoplasma pneumoniae), consider antibiotic de-escalation to target atypical bacterial infection.            XR Chest 1 View  Result Date: 7/3/2025  XR CHEST 1 VW Date of Exam: 7/3/2025 8:41 AM EDT Indication: pna and hypoxia Comparison: 7/1/2025 Findings: Right basilar pneumonia appears mildly improved. Left basilar atelectasis or pneumonia has resolved. Upper lungs appear clear. No pneumothorax or edema is seen. The heart and vasculature appear normal in size.     Impression: Improving right basilar pneumonia with moderate remaining disease. Interval clearing of the left lung base. No new chest pathology is seen. Electronically Signed: Cosmo West MD  7/3/2025 9:30 AM EDT  Workstation ID: [id number]    SLP FEES - Fiberoptic Endo Eval Swallow  Result Date: 7/1/2025  This procedure was auto-finalized with no dictation required.    XR Chest 1 View  Result Date: 7/1/2025  XR CHEST 1 VW Date of Exam: 7/1/2025 9:50 AM EDT Indication: Check for worsening PNA Comparison: 6/30/2025 Findings: Cardiomediastinal silhouette is within normal limits. Patchy opacity within the right lower lung. This appears slightly increased from prior examination. No pleural effusion or pneumothorax. No evidence of acute osseous abnormalities. Visualized upper abdomen is unremarkable.     Impression: Patchy opacity within the right lower lung appears slightly increased from prior examination. This may reflect infection or  atelectasis. Electronically Signed: Gurmeet Livingston MD  7/1/2025 10:27 AM EDT  Workstation ID: [id number]    XR Chest 1 View  Result Date: 6/30/2025  XR CHEST 1 VW Date of Exam: 6/30/2025 2:57 AM EDT Indication: hypoxia Comparison: 6/25/2025. Findings: There is no pneumothorax, pleural effusion or focal airspace consolidation. Heart size and pulmonary vasculature appear within normal limits. Regional bones appear intact.     Impression: No acute cardiopulmonary abnormality. Electronically Signed: Felton Jaime MD  6/30/2025 4:32 AM EDT  Workstation ID: [id number]    SLP FEES - Fiberoptic Endo Eval Swallow  Result Date: 6/27/2025  This procedure was auto-finalized with no dictation required.    CT Abdomen Pelvis Without Contrast  Result Date: 6/26/2025  CT ABDOMEN PELVIS WO CONTRAST Date of Exam: 6/25/2025 11:07 PM EDT Indication: left sided abdominal pain. Comparison: Chest CT 6/25/2025 Technique: Axial CT images were obtained of the abdomen and pelvis without the administration of contrast. Reconstructed coronal and sagittal images were also obtained. Automated exposure control and iterative construction methods were used. Findings: Emphysema noted in the lung bases. There is densely consolidating right lower lobe pneumonia with air bronchograms. Opacities in the dependent left lower lobe likely reflect some atelectasis. There is coronary artery disease and atherosclerotic disease. No abdominal aortic aneurysm is identified. Gallbladder is distended but otherwise normal. No biliary obstruction is seen. There is excreted contrast in the kidneys, ureters, and bladder from the prior chest CT. There is nonspecific perirenal fat stranding this is probably some bland edema although pyelonephritis should be excluded clinically. The kidneys are nonobstructed and otherwise grossly normal in appearance. There is some atrophy of the pancreas. Solid abdominal organs are otherwise normal. Urinary bladder and prostate gland  appear normal. The appendix is normal. There is colonic diverticulosis. No focal diverticulitis is identified, and there is no definite evidence of acute colitis. There are some prominent small bowel loops in the lower abdomen, and there are multiple gas-filled small bowel loops as well suggesting a generalized ileus rather than a bowel obstruction. Stomach is normal except for a small hiatal hernia. No adenopathy is seen. There is a retroaortic left renal vein. No free fluid. There are chronic compression fractures at L5 and L1.     1.Densely consolidating right lower lobe pneumonia. 2.Nonspecific perirenal fat stranding. This is probably bland edema although pyelonephritis should be excluded clinically. No hydronephrosis. 3.Colonic diverticulosis without evidence of diverticulitis. 4.There are some prominent small bowel loops in the lower abdomen, and there are multiple gas-filled small bowel loops as well suggesting a generalized ileus rather than a bowel obstruction. 5.Small hiatal hernia. 6.Additional findings as given above. Electronically Signed: Derrell Denton MD  6/26/2025 12:33 AM EDT  Workstation ID: [id number]    CT Angiogram Chest Pulmonary Embolism  Result Date: 6/25/2025  CT ANGIOGRAM CHEST PULMONARY EMBOLISM Date of Exam: 6/25/2025 6:29 PM EDT Indication: Pulmonary Embolism. Comparison: 5/23/2024 Technique: Axial CT images were obtained of the chest after the uneventful intravenous administration of 80 cc Isovue-370 IV contrast utilizing pulmonary embolism protocol.  In addition, a 3-D volume rendered image was created for interpretation.  Reconstructed coronal and sagittal images were also obtained. Automated exposure control and iterative construction methods were used. Findings: The thyroid gland is normal. Emphysema. The airway is clear. Atheromatous disease of the coronary vessels. No pulmonary embolus. Right lower lobe posterior consolidation with bronchial wall thickening concerning for  bronchitis. Dependent atelectatic changes noted on the left. Mild chronic anterior wedging of L1. Mild chronic anterior wedging of T7-T9. Exaggerated kyphosis of the thoracic spine. The sternum is intact. No acute rib fractures.     No pulmonary embolus. Right lower lobe consolidation with bronchial wall thickening concerning for bronchitis. Electronically Signed: Javier Mohan MD  6/25/2025 7:13 PM EDT  Workstation ID: [id number]    XR Chest 1 View  Result Date: 6/25/2025  XR CHEST 1 VW Date of Exam: 6/25/2025 3:44 PM EDT Indication: SOA triage protocol Comparison: Chest CT 5/23/2024, chest radiograph 11/23/2020. Findings: Cardiomediastinal silhouette is unchanged. Emphysematous changes of the lungs. Mild interstitial changes in the right and left lung bases. No pleural effusion or pneumothorax. Osseous structures are unchanged.     Impression: Mild interstitial changes in the right greater than left lung bases, which may reflect an infectious/inflammatory process. Background of emphysema. Electronically Signed: Iron Calderón MD  6/25/2025 4:07 PM EDT  Workstation ID: [id number]    CT Head Without Contrast  Result Date: 6/24/2025  CT HEAD WO CONTRAST, CT CERVICAL SPINE WO CONTRAST, CT THORACIC SPINE WO CONTRAST Date of Exam: 6/24/2025 10:06 PM EDT Indication: fall, trauma. Comparison: Head CT 11/23/2020. Chest CT 5/23/2024. Technique: Axial CT images were obtained of the head, cervical spine, and thoracic spine without contrast administration.  Automated exposure control and iterative construction methods were used. Findings: Head CT: No acute intracranial hemorrhage.Intact appearing gray-white differentiation.No extra-axial fluid collection.No significant mass effect. No hydrocephalus. Mild generalized parenchymal volume loss. Scattered areas of periventricular and subcortical white matter hypoattenuation, nonspecific, perhaps from small vessel ischemic/hypertensive changes in a patient of this age.There are  intracranial atherosclerotic calcifications. Mild scattered mucosal thickening in the paranasal sinuses.Mastoid air cells are essentially clear.Included globes and orbits appear unremarkable by CT. Left frontal scalp edema/contusion. No acute calvarial fracture seen. Cervical spine CT: There are 7 cervical type vertebral bodies. No acute fracture or traumatic malalignment. Straightening of the normal cervical lordosis.. Grade 1 anterolisthesis of C5 on C6. Multilevel degenerative endplate changes. Vertebral body heights otherwise appear maintained. There are multilevel degenerative changes of the cervical spine including disc osteophytes, uncovertebral hypertrophy, and facet arthropathy. Mild multifocal spinal canal stenosis. Multilevel moderate to severe neural foraminal narrowing. There is no significant prevertebral soft tissue edema.. Vascular calcifications. Scattered atelectasis and/or scarring in the imaged lungs. Thoracic spine CT: There are 12 rib-bearing vertebral bodies. No acute fracture or traumatic malalignment.. There is a chronic appearing compression fracture at L1. There is also multilevel chronic appearing anterior wedging in the mid thoracic vertebrae. There is exaggerated thoracic kyphosis. Multilevel degenerative endplate changes. Vertebral body heights are otherwise maintained. Multilevel degenerative changes including disc height loss, osteophytes, and facet arthropathy. Mild multilevel spinal canal stenosis. Mild to moderate multilevel foraminal narrowing. Consolidative opacities in the right lower lobe. Vascular calcifications.     Impression: HEAD CT: No acute intracranial findings. CERVICAL SPINE CT: No acute fracture or traumatic malalignment. THORACIC SPINE CT: No acute fracture or traumatic malalignment. Chronic appearing compression fracture at L1 and multilevel chronic anterior wedging in the midthoracic vertebrae. Consolidative opacities in the right lower lobe, suspicious for  aspiration or pneumonia. Electronically Signed: Richy Monsivais MD  6/24/2025 10:38 PM EDT  Workstation ID: [id number]    CT Cervical Spine Without Contrast  Result Date: 6/24/2025  CT HEAD WO CONTRAST, CT CERVICAL SPINE WO CONTRAST, CT THORACIC SPINE WO CONTRAST Date of Exam: 6/24/2025 10:06 PM EDT Indication: fall, trauma. Comparison: Head CT 11/23/2020. Chest CT 5/23/2024. Technique: Axial CT images were obtained of the head, cervical spine, and thoracic spine without contrast administration.  Automated exposure control and iterative construction methods were used. Findings: Head CT: No acute intracranial hemorrhage.Intact appearing gray-white differentiation.No extra-axial fluid collection.No significant mass effect. No hydrocephalus. Mild generalized parenchymal volume loss. Scattered areas of periventricular and subcortical white matter hypoattenuation, nonspecific, perhaps from small vessel ischemic/hypertensive changes in a patient of this age.There are intracranial atherosclerotic calcifications. Mild scattered mucosal thickening in the paranasal sinuses.Mastoid air cells are essentially clear.Included globes and orbits appear unremarkable by CT. Left frontal scalp edema/contusion. No acute calvarial fracture seen. Cervical spine CT: There are 7 cervical type vertebral bodies. No acute fracture or traumatic malalignment. Straightening of the normal cervical lordosis.. Grade 1 anterolisthesis of C5 on C6. Multilevel degenerative endplate changes. Vertebral body heights otherwise appear maintained. There are multilevel degenerative changes of the cervical spine including disc osteophytes, uncovertebral hypertrophy, and facet arthropathy. Mild multifocal spinal canal stenosis. Multilevel moderate to severe neural foraminal narrowing. There is no significant prevertebral soft tissue edema.. Vascular calcifications. Scattered atelectasis and/or scarring in the imaged lungs. Thoracic spine CT: There are 12  rib-bearing vertebral bodies. No acute fracture or traumatic malalignment.. There is a chronic appearing compression fracture at L1. There is also multilevel chronic appearing anterior wedging in the mid thoracic vertebrae. There is exaggerated thoracic kyphosis. Multilevel degenerative endplate changes. Vertebral body heights are otherwise maintained. Multilevel degenerative changes including disc height loss, osteophytes, and facet arthropathy. Mild multilevel spinal canal stenosis. Mild to moderate multilevel foraminal narrowing. Consolidative opacities in the right lower lobe. Vascular calcifications.     Impression: HEAD CT: No acute intracranial findings. CERVICAL SPINE CT: No acute fracture or traumatic malalignment. THORACIC SPINE CT: No acute fracture or traumatic malalignment. Chronic appearing compression fracture at L1 and multilevel chronic anterior wedging in the midthoracic vertebrae. Consolidative opacities in the right lower lobe, suspicious for aspiration or pneumonia. Electronically Signed: Richy Monsivais MD  6/24/2025 10:38 PM EDT  Workstation ID: [id number]    CT Thoracic Spine Without Contrast  Result Date: 6/24/2025  CT HEAD WO CONTRAST, CT CERVICAL SPINE WO CONTRAST, CT THORACIC SPINE WO CONTRAST Date of Exam: 6/24/2025 10:06 PM EDT Indication: fall, trauma. Comparison: Head CT 11/23/2020. Chest CT 5/23/2024. Technique: Axial CT images were obtained of the head, cervical spine, and thoracic spine without contrast administration.  Automated exposure control and iterative construction methods were used. Findings: Head CT: No acute intracranial hemorrhage.Intact appearing gray-white differentiation.No extra-axial fluid collection.No significant mass effect. No hydrocephalus. Mild generalized parenchymal volume loss. Scattered areas of periventricular and subcortical white matter hypoattenuation, nonspecific, perhaps from small vessel ischemic/hypertensive changes in a patient of this age.There  are intracranial atherosclerotic calcifications. Mild scattered mucosal thickening in the paranasal sinuses.Mastoid air cells are essentially clear.Included globes and orbits appear unremarkable by CT. Left frontal scalp edema/contusion. No acute calvarial fracture seen. Cervical spine CT: There are 7 cervical type vertebral bodies. No acute fracture or traumatic malalignment. Straightening of the normal cervical lordosis.. Grade 1 anterolisthesis of C5 on C6. Multilevel degenerative endplate changes. Vertebral body heights otherwise appear maintained. There are multilevel degenerative changes of the cervical spine including disc osteophytes, uncovertebral hypertrophy, and facet arthropathy. Mild multifocal spinal canal stenosis. Multilevel moderate to severe neural foraminal narrowing. There is no significant prevertebral soft tissue edema.. Vascular calcifications. Scattered atelectasis and/or scarring in the imaged lungs. Thoracic spine CT: There are 12 rib-bearing vertebral bodies. No acute fracture or traumatic malalignment.. There is a chronic appearing compression fracture at L1. There is also multilevel chronic appearing anterior wedging in the mid thoracic vertebrae. There is exaggerated thoracic kyphosis. Multilevel degenerative endplate changes. Vertebral body heights are otherwise maintained. Multilevel degenerative changes including disc height loss, osteophytes, and facet arthropathy. Mild multilevel spinal canal stenosis. Mild to moderate multilevel foraminal narrowing. Consolidative opacities in the right lower lobe. Vascular calcifications.     Impression: HEAD CT: No acute intracranial findings. CERVICAL SPINE CT: No acute fracture or traumatic malalignment. THORACIC SPINE CT: No acute fracture or traumatic malalignment. Chronic appearing compression fracture at L1 and multilevel chronic anterior wedging in the midthoracic vertebrae. Consolidative opacities in the right lower lobe, suspicious for  aspiration or pneumonia. Electronically Signed: Richy Monsivais MD  6/24/2025 10:38 PM EDT  Workstation ID: [id number]              Results for orders placed during the hospital encounter of 06/25/25    Adult Transthoracic Echo Complete W/ Cont if Necessary Per Protocol 06/26/2025  4:44 PM    Interpretation Summary    Technically difficult study.    Left ventricular ejection fraction appears to be 51 - 55%.    Normal right ventricular wall thickness and systolic function noted. The right ventricular cavity is mildly dilated.    No hemodynamically significant valvular stenosis or regurgitation    The left atrial cavity is moderately dilated.      Plan for Follow-up of Pending Labs/Results: none    Discharge Details        Discharge Medications        New Medications        Instructions Start Date   azithromycin 500 MG tablet  Commonly known as: ZITHROMAX   500 mg, Oral, Every 24 Hours Scheduled   Start Date: July 4, 2025            Changes to Medications        Instructions Start Date   aspirin 81 MG EC tablet  Commonly known as: Aspirin Low Dose  What changed: additional instructions   81 mg, Oral, Daily      DULoxetine 60 MG capsule  Commonly known as: CYMBALTA  What changed: when to take this   60 mg, Oral, Daily             Continue These Medications        Instructions Start Date   albuterol sulfate  (90 Base) MCG/ACT inhaler  Commonly known as: PROVENTIL HFA;VENTOLIN HFA;PROAIR HFA   2 puffs, Inhalation, Every 4 Hours PRN      atorvastatin 40 MG tablet  Commonly known as: LIPITOR   40 mg, Oral, Nightly      azelastine 0.1 % nasal spray  Commonly known as: ASTELIN   2 sprays, Nasal, As Needed, OTC      fluticasone 50 MCG/ACT nasal spray  Commonly known as: FLONASE   Spray 2 spray into both nostrils once a day      Fluticasone-Salmeterol 250-50 MCG/ACT DISKUS  Commonly known as: ADVAIR/WIXELA   1 puff, Inhalation, 2 Times Daily - RT      lisinopril 10 MG tablet  Commonly known as: PRINIVIL,ZESTRIL   10 mg,  "Oral, Daily      metoprolol succinate XL 25 MG 24 hr tablet  Commonly known as: TOPROL-XL   25 mg, Oral, Daily      omeprazole 20 MG capsule  Commonly known as: priLOSEC   20 mg, Oral, Daily      prazosin 2 MG capsule  Commonly known as: MINIPRESS   2 mg, Oral, Every Night at Bedtime             Stop These Medications      acetaminophen 500 MG tablet  Commonly known as: TYLENOL              Allergies   Allergen Reactions    Penicillins GI Intolerance     \" I VOMIT\"          Discharge Disposition:  [place] [facility] [place]    Diet:  Hospital:  Diet Order   Procedures    Diet: Regular/House; No Mixed Consistencies, No Straw; Texture: Soft to Chew (NDD 3); Soft to Chew: Chopped Meat; Fluid Consistency: Nectar Thick       Diet Instructions       Diet: Regular/House Diet; Soft to Chew (NDD 3); Chopped Meat; Nectar Thick; No Mixed Consistencies      Discharge Diet: Regular/House Diet    Texture: Soft to Chew (NDD 3)    Soft to Chew: Chopped Meat    Fluid Consistency: Nectar Thick    Diet Modifiers / Additional Diets: No Mixed Consistencies             Activity:  Activity Instructions       Activity as Tolerated              Restrictions or Other Recommendations:  No smoking       CODE STATUS:    Code Status and Medical Interventions: CPR (Attempt to Resuscitate); Full Support   Ordered at: 06/25/25 2015     Code Status (Patient has no pulse and is not breathing):    CPR (Attempt to Resuscitate)     Medical Interventions (Patient has pulse or is breathing):    Full Support       Future Appointments   Date Time Provider Department Center   8/18/2026  9:15 AM Reuben Shine MD [facility]       Additional Instructions for the Follow-ups that You Need to Schedule       Ambulatory Referral to Speech Therapy   As directed      Follow-up needed: Yes        Discharge Follow-up with PCP   As directed       Currently Documented PCP:    Concepción Quigley DO    PCP Phone Number:    [phone]     Follow Up Details: 1 week to " review meds and check oxygen level        Discharge Follow-up with Specified Provider: speech therapy for swallowing; 1 Week   As directed      To: speech therapy for swallowing   Follow Up: 1 Week                      Nina Guadalupe MD  07/03/25      Time Spent on Discharge:  I spent  42  minutes on this discharge activity which included: face-to-face encounter with the patient, reviewing the data in the system, coordination of the care with the nursing staff as well as consultants, documentation, and entering orders.

## 2025-07-03 NOTE — PROGRESS NOTES
"          Clinical Nutrition Assessment     Patient Name: Oscar Avila  YOB: 1960  MRN: [id number]  Date of Encounter: 07/03/25 13:27 EDT  Admission date: 6/25/2025  Reason for Visit: Identified at risk by screening criteria, LOS    Assessment   Nutrition Assessment   Admission Diagnosis:  Acute hypoxic respiratory failure [J96.01]    Problem List:    Acute hypoxic respiratory failure    Hyperlipidemia    Primary hypertension    Sepsis    Pneumonia    Hyponatremia    Abdominal pain    Bronchitis    COPD (chronic obstructive pulmonary disease)    Tobacco abuse    GERD without esophagitis    Ileus      PMH:   He  has a past medical history of Alcohol use, Hyperlipidemia, Hypertension, and Neuromuscular disorder.    PSH:  He  has a past surgical history that includes Carpal tunnel release (Left); Foot fracture surgery (Right, 01/01/2018); Carpal tunnel release (Right, 06/12/2023); and [place] tooth extraction.    Applicable Nutrition History:     Anthropometrics     Height: Height: 180.3 cm (70.98\")  Last Filed Weight: Weight: 82.6 kg (182 lb) (06/26/25 1614)  Method: Weight Method: Standing scale  BMI: BMI (Calculated): 25.4    UBW:  POLLO  Weight change: No significant changes     Weight       Weight (kg) Weight (lbs) Weight Method Visit Report   1/16/2024 81.33 kg  179 lb 4.8 oz   --    1/17/2024 83.371 kg  183 lb 12.8 oz   --    3/5/2024 83.008 kg  183 lb      4/30/2024 82.192 kg  181 lb 3.2 oz   --    7/30/2024 86.388 kg  190 lb 7.2 oz   --    5/27/2025 82.192 kg  181 lb 3.2 oz   --    6/24/2025 80.74 kg  178 lb  Stated     6/25/2025 81 kg  178 lb 9.2 oz  Standing scale      82.963 kg  182 lb 14.4 oz      6/26/2025 82.555 kg  182 lb        Nutrition Focused Physical Exam    Date:  7/3       Pt does not meet criteria for malnutrition diagnosis, at this time.      Subjective   Reported/Observed/Food/Nutrition Related History:     Patient screened per nutrition protocol for length of stay. " Presented for SOB and coughing for the past week. Noted to have bronchitis with hx of COPD. Patient sitting up in bed at time of visit, pleasant to speak with. Endorsed good appetite and PO intake. Denied any recent weight loss. Declined any chewing or swallowing difficulties. Declined all ONS options at this time. NKFA.    Current Nutrition Prescription   PO: Diet: Regular/House; No Mixed Consistencies, No Straw; Texture: Soft to Chew (NDD 3); Soft to Chew: Chopped Meat; Fluid Consistency: [place] Thick  Oral Nutrition Supplement: N/A  Intake: 4 Days: 94% x 8 meals    Assessment & Plan   Nutrition Diagnosis   Date:  7/3            Updated:    Problem No nutrition diagnosis at this time   Etiology    Signs/Symptoms    Status:      Goal:   Nutrition to support treatment and Maintain intake      Nutrition Intervention      Follow treatment progress, Care plan reviewed, Advise alternate selection, Advised available snacks, Interview for preferences, Menu provided, Encourage intake    Encourage continued adequate PO intakes  Consider ONS if intakes <50%    Monitoring/Evaluation:   Per protocol, PO intake, Pertinent labs, Weight, Symptoms, POC/GOC    Kathleen Ochoa RD  Time Spent: 35m

## 2025-07-03 NOTE — THERAPY TREATMENT NOTE
Patient Name: Oscar Avila  : 1960    MRN: [id number]                              Today's Date: 7/3/2025       Admit Date: 2025    Visit Dx:     ICD-10-CM ICD-9-CM   1. SOB (shortness of breath)  R06.02 786.05   2. Hypoxia  R09.02 799.02   3. Pneumonia of both lower lobes due to infectious organism  J18.9 486   4. Leukocytosis, unspecified type  D72.829 288.60   5. Hyponatremia  E87.1 276.1   6. Pharyngeal dysphagia  R13.13 787.23   7. Chronic obstructive pulmonary disease with acute lower respiratory infection  J44.0 496     519.8   8. ANGELA (obstructive sleep apnea)  G47.33 327.23   9. Acute hypoxic respiratory failure  J96.01 518.81   10. Other emphysema  J43.8 492.8   11. Dysphagia, unspecified type  R13.10 787.20     Patient Active Problem List   Diagnosis    COPD (chronic obstructive pulmonary disease)    Hyperlipidemia    Coronary artery calcification    Emphysema, unspecified    Current smoker    Alcoholism in recovery    Primary hypertension    Polyp of colon    ANGELA (obstructive sleep apnea)    Acute hypoxic respiratory failure    Sepsis    Pneumonia    Hyponatremia    Abdominal pain    Bronchitis    COPD (chronic obstructive pulmonary disease)    Tobacco abuse    GERD without esophagitis    Ileus    Legionella pneumonia     Past Medical History:   Diagnosis Date    Alcohol use     Hyperlipidemia     Hypertension     Neuromuscular disorder      Past Surgical History:   Procedure Laterality Date    CARPAL TUNNEL RELEASE Left     CARPAL TUNNEL RELEASE Right 2023    Dr. Esa Acosta    FOOT FRACTURE SURGERY Right 2018    fracture to right foot - has a metal pin on great toe    WISDOM TOOTH EXTRACTION        General Information       Row Name 25 1520          OT Time and Intention    Document Type therapy note (daily note)  -LR     Mode of Treatment occupational therapy  -LR       Row Name 25 1520          General Information    Patient Profile Reviewed yes  -LR      Existing Precautions/Restrictions fall;oxygen therapy device and L/min  -LR     Barriers to Rehab medically complex;previous functional deficit  -LR       Row Name 07/03/25 1520          Cognition    Orientation Status (Cognition) oriented x 3  -LR       Row Name 07/03/25 1520          Safety Issues/Impairments Affecting Functional Mobility    Safety Issues Affecting Function (Mobility) awareness of need for assistance  -LR     Impairments Affecting Function (Mobility) shortness of breath;endurance/activity tolerance  -LR               User Key  (r) = Recorded By, (t) = Taken By, (c) = Cosigned By      Initials Name Provider Type    LR Delisa Pollard, OT Occupational Therapist                     Mobility/ADL's       Row Name 07/03/25 1520          Bed Mobility    Bed Mobility supine-sit  -LR     Supine-Sit [place] (Bed Mobility) modified independence  -LR     Assistive Device (Bed Mobility) head of bed elevated  -LR       Row Name 07/03/25 1520          Transfers    Transfers sit-stand transfer  -LR     Comment, (Transfers) STSx3 for getting ready for D/C  -LR       Row Name 07/03/25 1520          Sit-Stand Transfer    Sit-Stand [place] (Transfers) independent  -LR     Assistive Device (Sit-Stand Transfers) other (see comments)  none  -LR       Row Name 07/03/25 1520          Functional Mobility    Functional Mobility- Ind. Level conditional independence  -LR     Functional Mobility- Device other (see comments)  none  -LR     Functional Mobility-Distance (Feet) --  >HH distance  -LR     Patient was able to Ambulate yes  -LR       Row Name 07/03/25 1520          Activities of Daily Living    BADL Assessment/Intervention upper body dressing;lower body dressing;grooming  -LR       Row Name 07/03/25 1520          Lower Body Dressing Assessment/Training    [place] Level (Lower Body Dressing) doff;don;pants/bottoms;socks;shoes/slippers;set up  -LR     Position (Lower Body Dressing) edge of bed  sitting;unsupported standing  -LR       Row Name 07/03/25 1520          Grooming Assessment/Training    [place] Level (Grooming) hair care, combing/brushing;other (see comments);modified independence  deodorant  -LR     Position (Grooming) unsupported standing  -LR       Row Name 07/03/25 1520          Upper Body Dressing Assessment/Training    [place] Level (Upper Body Dressing) doff;pajama/robe;don;front opening garment;modified independence  -LR     Position (Upper Body Dressing) unsupported standing;edge of bed sitting  -LR               User Key  (r) = Recorded By, (t) = Taken By, (c) = Cosigned By      Initials Name Provider Type    Delisa Lam OT Occupational Therapist                   Obj/Interventions       Row Name 07/03/25 1522          Balance    Balance Assessment sitting static balance;sitting dynamic balance;standing static balance;standing dynamic balance  -LR     Static Sitting Balance independent  -LR     Dynamic Sitting Balance independent  -LR     Position, Sitting Balance unsupported;sitting edge of bed  -LR     Static Standing Balance independent  -LR     Dynamic Standing Balance modified independence  -LR     Position/Device Used, Standing Balance unsupported  -LR     Balance Interventions sitting;standing;sit to stand;supported;static;dynamic;occupation based/functional task  -LR               User Key  (r) = Recorded By, (t) = Taken By, (c) = Cosigned By      Initials Name Provider Type    LR Delisa Pollard, MICHAEL Occupational Therapist                   Goals/Plan    No documentation.                  Clinical Impression       Row Name 07/03/25 1523          Pain Assessment    Pretreatment Pain Rating 0/10 - no pain  -LR     Posttreatment Pain Rating 0/10 - no pain  -LR       Row Name 07/03/25 1523          Plan of Care Review    Plan of Care Reviewed With patient  -LR     Progress improving  -LR     Outcome Evaluation Pt with improvements this session in ADL  performance and functional mobility. Ambulated >200 ft with no AD. No SOA reported on 2L O2. UBD/LBD complete with mod I for D/C home. Recommend home with assist at D/C.  -LR       Row Name 07/03/25 1523          Therapy Plan Review/Discharge Plan (OT)    Anticipated Discharge Disposition (OT) home;home with assist  -LR       Row Name 07/03/25 1523          Vital Signs    Pre SpO2 (%) 91  -LR     O2 Delivery Pre Treatment nasal cannula  -LR     Intra SpO2 (%) 90  -LR     O2 Delivery Intra Treatment nasal cannula  -LR     Post SpO2 (%) 90  -LR     O2 Delivery Post Treatment nasal cannula  -LR     Pre Patient Position Supine  -LR     Intra Patient Position Standing  -LR     Post Patient Position Sitting  -LR       Row Name 07/03/25 1523          Positioning and Restraints    Pre-Treatment Position in bed  -LR     Post Treatment Position bed  -LR     In Bed notified nsg;sitting;call light within reach;encouraged to call for assist;with family/caregiver  -LR               User Key  (r) = Recorded By, (t) = Taken By, (c) = Cosigned By      Initials Name Provider Type    LR Delisa Pollard, OT Occupational Therapist                   Outcome Measures       Row Name 07/03/25 1525          How much help from another is currently needed...    Putting on and taking off regular lower body clothing? 4  -LR     Bathing (including washing, rinsing, and drying) 4  -LR     Toileting (which includes using toilet bed pan or urinal) 4  -LR     Putting on and taking off regular upper body clothing 4  -LR     Taking care of personal grooming (such as brushing teeth) 4  -LR     Eating meals 4  -LR     AM-PAC 6 Clicks Score (OT) 24  -LR       Row Name 07/03/25 0800          How much help from another person do you currently need...    Turning from your back to your side while in flat bed without using bedrails? 4  -CG     Moving from lying on back to sitting on the side of a flat bed without bedrails? 3  -CG     Moving to and from a bed  to a chair (including a wheelchair)? 3  -CG     Standing up from a chair using your arms (e.g., wheelchair, bedside chair)? 4  -CG     Climbing 3-5 steps with a railing? 3  -CG     To walk in hospital room? 3  -CG     AM-PAC 6 Clicks Score (PT) 20  -CG     Highest Level of Mobility Goal Walk 10 Steps or More-6  -CG       Row Name 07/03/25 1525          Functional Assessment    Outcome Measure Options AM-PAC 6 Clicks Daily Activity (OT)  -LR               User Key  (r) = Recorded By, (t) = Taken By, (c) = Cosigned By      Initials Name Provider Type    CG Aaliyah Eli, RN Registered Nurse    LR Delisa Pollard, OT Occupational Therapist                    Occupational Therapy Education       Title: PT OT SLP Therapies (In Progress)       Topic: Occupational Therapy (In Progress)       Point: ADL training (In Progress)       Learning Progress Summary            Patient Acceptance, E, NR by LR at 7/3/2025 1525    Acceptance, E, NR by SA at 6/30/2025 1358   Family Acceptance, E, NR by LR at 7/3/2025 1525                      Point: Home exercise program (In Progress)       Learning Progress Summary            Patient Acceptance, E, NR by LR at 7/3/2025 1525   Family Acceptance, E, NR by LR at 7/3/2025 1525                      Point: Precautions (In Progress)       Learning Progress Summary            Patient Acceptance, E, NR by LR at 7/3/2025 1525   Family Acceptance, E, NR by LR at 7/3/2025 1525                      Point: Body mechanics (In Progress)       Learning Progress Summary            Patient Acceptance, E, NR by LR at 7/3/2025 1525    Acceptance, E, NR by SA at 6/30/2025 1358   Family Acceptance, E, NR by LR at 7/3/2025 1525                                      User Key       Initials Effective Dates Name Provider Type Discipline    LR 10/09/24 -  Delisa Pollard, OT Occupational Therapist OT    SA 04/16/25 -  Amanda Rees OT Occupational Therapist OT                  OT Recommendation and  Plan     Plan of Care Review  Plan of Care Reviewed With: patient  Progress: improving  Outcome Evaluation: Pt with improvements this session in ADL performance and functional mobility. Ambulated >200 ft with no AD. No SOA reported on 2L O2. UBD/LBD complete with mod I for D/C home. Recommend home with assist at D/C.     Time Calculation:         Time Calculation- OT       Row Name 07/03/25 1525             Time Calculation- OT    OT Start Time 1459  -LR      OT Received On 07/03/25  -LR      OT Goal Re-Cert Due Date 07/06/25  -LR         Timed Charges    63858 - OT Therapeutic Activity Minutes 10  -LR      23974 - OT Self Care/Mgmt Minutes 13  -LR         Total Minutes    Timed Charges Total Minutes 23  -LR       Total Minutes 23  -LR                User Key  (r) = Recorded By, (t) = Taken By, (c) = Cosigned By      Initials Name Provider Type    LR Delisa Pollard OT Occupational Therapist                  Therapy Charges for Today       Code Description Service Date Service Provider Modifiers Qty    77980492985 HC OT THERAPEUTIC ACT EA 15 MIN 7/3/2025 Delisa Pollard OT GO 1    13463892967 HC OT SELF CARE/MGMT/TRAIN EA 15 MIN 7/3/2025 Delisa Pollard OT GO 1                 Delisa Pollard OT  7/3/2025

## 2025-07-03 NOTE — CASE MANAGEMENT/SOCIAL WORK
Continued Stay Note  [facility]     Patient Name: Oscar Avila  MRN: [id number]  Today's Date: 7/3/2025    Admit Date: 6/25/2025    Plan: Home with spouse   Discharge Plan       Row Name 07/03/25 1241       Plan    Plan Home with spouse    Patient/Family in Agreement with Plan yes    Plan Comments Plan is home with spouse. CM spoke with Mitchell from [employer] and ordered home oxygen. Patient room air sat was 86. Mitchell will deliver a protable tank to the patient's room. CM will continue to follow.    Final Discharge Disposition Code 01 - home or self-care                   Discharge Codes    No documentation.                 Expected Discharge Date and Time       Expected Discharge Date Expected Discharge Time    Jul 4, 2025               Adrien Damon RN

## 2025-07-03 NOTE — CASE MANAGEMENT/SOCIAL WORK
Continued Stay Note  [facility]     Patient Name: Oscar Avila  MRN: [id number]  Today's Date: 7/3/2025    Admit Date: 6/25/2025    Plan: Home with spouse   Discharge Plan       Row Name 07/03/25 0842       Plan    Plan Home with spouse    Patient/Family in Agreement with Plan yes    Plan Comments Spoke with patient at bedside. Plan is home with spouse. Patient is on 4L NC. Patient denies any CM discharge needs. CM will continue to follow.    Final Discharge Disposition Code 01 - home or self-care                   Discharge Codes    No documentation.                 Expected Discharge Date and Time       Expected Discharge Date Expected Discharge Time    Jul 4, 2025               Adrien Damon RN

## 2025-07-03 NOTE — THERAPY TREATMENT NOTE
Acute Care - Speech Language Pathology   Swallow Treatment Note [facility]     Patient Name: Oscar Avila  : 1960  MRN: [id number]  Today's Date: 7/3/2025               Admit Date: 2025    Visit Dx:     ICD-10-CM ICD-9-CM   1. SOB (shortness of breath)  R06.02 786.05   2. Hypoxia  R09.02 799.02   3. Pneumonia of both lower lobes due to infectious organism  J18.9 486   4. Leukocytosis, unspecified type  D72.829 288.60   5. Hyponatremia  E87.1 276.1   6. Pharyngeal dysphagia  R13.13 787.23   7. Chronic obstructive pulmonary disease with acute lower respiratory infection  J44.0 496     519.8   8. ANGELA (obstructive sleep apnea)  G47.33 327.23   9. Acute hypoxic respiratory failure  J96.01 518.81   10. Other emphysema  J43.8 492.8   11. Oropharyngeal dysphagia  R13.12 787.22     Patient Active Problem List   Diagnosis    COPD (chronic obstructive pulmonary disease)    Hyperlipidemia    Coronary artery calcification    Emphysema, unspecified    Current smoker    Alcoholism in recovery    Primary hypertension    Polyp of colon    ANGELA (obstructive sleep apnea)    Acute hypoxic respiratory failure    Sepsis    Pneumonia    Hyponatremia    Abdominal pain    Bronchitis    COPD (chronic obstructive pulmonary disease)    Tobacco abuse    GERD without esophagitis    Ileus    Legionella pneumonia     Past Medical History:   Diagnosis Date    Alcohol use     Hyperlipidemia     Hypertension     Neuromuscular disorder      Past Surgical History:   Procedure Laterality Date    CARPAL TUNNEL RELEASE Left     CARPAL TUNNEL RELEASE Right 2023    Dr. Esa Acosta    FOOT FRACTURE SURGERY Right 2018    fracture to right foot - has a metal pin on great toe    WISDOM TOOTH EXTRACTION         SLP Recommendation and Plan     SLP Diet Recommendation: soft to chew textures, chopped, nectar thick liquids, no mixed consistencies (25 1640)  Recommended Precautions and Strategies: general aspiration  precautions, no straw, small bites of food and sips of liquid (07/03/25 1640)  SLP Rec. for Method of Medication Administration: meds whole, with puree (07/03/25 1640)              Anticipated Discharge Disposition (SLP): home with OP services (07/03/25 1640)           Oral Care Recommendations: Oral Care BID/PRN, Toothbrush (07/03/25 1640)        Daily Summary of Progress (SLP): progress toward functional goals as expected, prepare for discharge (07/03/25 1640)               Treatment Assessment (SLP): continued, moderate, pharyngeal dysphagia, suspected (07/03/25 1640)  Treatment Assessment Comments (SLP): Pt discharging home. Provided extensive education re: FEES results, silent aspiration, risks of aspiration, recommendations, how to manage diet/liquid modifications @ home, and swallowing exericse/treatment program. Provided handouts and samples of thickener. Pt/spouse stated understanding and expressed appreciation. Pt would benefit from cont'd SLP services upon d/c and eventual outpatient study (MBS vs. FEES) to re-assess swallowing for possible liquid upgrade, when clinically appropriate. (07/03/25 1640)  Plan for Continued Treatment (SLP): continue treatment per plan of care (07/03/25 1640)         Progress: no change      SWALLOW EVALUATION (Last 72 Hours)       SLP Adult Swallow Evaluation       Row Name 07/03/25 1640 07/01/25 1410                Rehab Evaluation    Document Type therapy note (daily note)  - re-evaluation  -       Subjective Information no complaints  - no complaints  -       Patient Observations alert;cooperative  - alert;cooperative  -       Patient/Family/Caregiver Comments/Observations Spouse present.  - No family present  -       Patient Effort excellent  -AC good  -       Symptoms Noted During/After Treatment -- none  -          General Information    Patient Profile Reviewed yes  -AC yes  -       Pertinent History Of Current Problem -- See initial eval, pt  referred for FEES  -       Current Method of Nutrition -- soft to chew textures;chopped;honey-thick liquids  -       Precautions/Limitations, Vision -- WFL with corrective lenses;for purposes of eval  -       Precautions/Limitations, Hearing -- WFL;for purposes of eval  -       Prior Level of Function-Communication -- unknown  -       Prior Level of Function-Swallowing -- safe, efficient swallowing in all situations  -       Plans/Goals Discussed with -- patient;agreed upon  [facility]       Barriers to Rehab -- none identified  -       Patient's Goals for Discharge -- patient did not state  -          Pain    Additional Documentation -- Pain Scale: FACES Pre/Post-Treatment (Group)  -          Pain Scale: FACES Pre/Post-Treatment    Pain: FACES Scale, Pretreatment 0-->no hurt  -AC 0-->no hurt  -       Posttreatment Pain Rating 0-->no hurt  -AC 0-->no hurt  [facility]          Fiberoptic Endoscopic Evaluation of Swallowing (FEES)    Risks/Benefits Reviewed -- risks/benefits explained;patient;agreed to eval  -       Nasal Entry -- right:  -       Scope serial number/identification -- 338  -          Anatomy and Physiology    Anatomic Considerations -- no anatomic structural deviation  -       Velopharyngeal -- CNA  -       Base of Tongue -- symmetrical  -       Laryngeal Function Breathing -- symmetrical  -       Laryngeal Function Phonation -- symmetrical  -       Laryngeal Function to Breath Hold -- TVF/FVF/Arytenoid;sustained closure  -       Secretion Rating Scale (Jose Francisco et al. 1996) -- 0- normal, no visible secretions  -       Ice Chips -- DNA  -       Spontaneous Swallow -- frequency adequate  -       Sensory -- sensed scope  -       Utensils Used -- Spoon;Cup;Straw  -       Consistencies Trialed -- thin liquids;nectar-thick liquids;honey-thick liquids;pudding/puree;regular textures  -          FEES Interpretation    Oral Phase -- prespill of liquids into pharynx  -           Initiation of Pharyngeal Swallow    Initiation of Pharyngeal Swallow -- bolus in pyriform sinuses  -       Pharyngeal Phase -- impaired pharyngeal phase of swallowing  -       Penetration Before the Swallow -- thin liquids;secondary to delayed swallow initiation or mistiming;secondary to reduced back of tongue control  -       Aspiration During the Swallow -- thin liquids;secondary to reduced laryngeal elevation;secondary to delayed swallow initiation or mistiming  -       Penetration After the Swallow -- nectar-thick liquids;secondary to residue;in valleculae;in pyriform sinuses  -       Depth of Penetration -- deep  -       Response to Penetration -- No  -MH       No spontaneous response to penetration and -- non-effective laryngeal clearance with cue (see comments);other (see comments)  cued cough  -       Response to Aspiration -- No  -       No spontaneous response to aspiration with -- non-effective subglottic clearance with cue (see comments);other (see comments)  cued cough  -       Rosenbek's Scale -- thin:;8-->Level 8;nectar:;3-->Level 3;honey:;pudding/puree:;1-->Level 1  NTL level 3 via straw only  -       Residue -- no significant pharyngeal residue noted  -       Attempted Compensatory Maneuvers -- bolus size;bolus presentation style;additional subsequent swallow;effortful (hard swallow)  -       Response to Attempted Compensatory Maneuvers -- did not prevent penetration;did not prevent aspiration  -       Successful Compensatory Maneuver Competency -- patient able to;demonstrate compensations  -       FEES Summary -- Moderate pharyngeal dysphagia. Prespill w/ thins & NTL to the pyriforms. Deep penetration w/ thins before the swallow w/ subsequent silent aspiration during the swallow. Cued cough ineffective in clearing. Penetration w/ NTL via straw after the swallow, aspiration deemed inevitable given depth of penetration & inability to clear. No penetration/aspiration w/  NTL via cup, HTL, pudding, or regular solid consistenices. Recommend soft/chopped solid diet w/ NTL, no mixed consistenices, no straw, small/single bites & sips  -          Swallowing Quality of Life Assessment    Education and counseling provided Silent aspiration;Risks of aspiration;Safest diet options;Oral care recommendations and rationale;Aspiration precautions;Compensatory strategy recommendations and rationale  -AC --          SLP Evaluation Clinical Impression    SLP Swallowing Diagnosis -- moderate;pharyngeal dysphagia  -       Functional Impact -- risk of aspiration/pneumonia  -       Rehab Potential/Prognosis, Swallowing -- good, to achieve stated therapy goals  -       Swallow Criteria for Skilled Therapeutic Interventions Met -- demonstrates skilled criteria  -          SLP Treatment Clinical Impressions    Treatment Assessment (SLP) continued;moderate;pharyngeal dysphagia;suspected  -AC --       Treatment Assessment Comments (SLP) Pt discharging home. Provided extensive education re: FEES results, silent aspiration, risks of aspiration, recommendations, how to manage diet/liquid modifications @ home, and swallowing exericse/treatment program. Provided handouts and samples of thickener. Pt/spouse stated understanding and expressed appreciation. Pt would benefit from cont'd SLP services upon d/c and eventual outpatient study (MBS vs. FEES) to re-assess swallowing for possible liquid upgrade, when clinically appropriate.  -AC --       Daily Summary of Progress (SLP) progress toward functional goals as expected;prepare for discharge  -AC --       Plan for Continued Treatment (SLP) continue treatment per plan of care  -AC --       Care Plan Review care plan/treatment goals reviewed;evaluation/treatment results reviewed;risks/benefits reviewed;current/potential barriers reviewed;patient/other agree to care plan  -AC --       Care Plan Review, Other Participant(s) spouse  -AC --           Recommendations    Therapy Frequency (Swallow) -- 5 days per week  -       Predicted Duration Therapy Intervention (Days) -- 1 week  -       SLP Diet Recommendation soft to chew textures;chopped;nectar thick liquids;no mixed consistencies  - soft to chew textures;chopped;nectar thick liquids;no mixed consistencies  -       Recommended Precautions and Strategies general aspiration precautions;no straw;small bites of food and sips of liquid  - general aspiration precautions;no straw;other (see comments)  small/single bites & sips  -       Oral Care Recommendations Oral Care BID/PRN;Toothbrush  - Oral Care BID/PRN;Toothbrush  -       SLP Rec. for Method of Medication Administration meds whole;with puree  - meds whole;with puree  -       Monitor for Signs of Aspiration -- yes;notify SLP if any concerns  -       Anticipated Discharge Disposition (SLP) home with OP services  - inpatient rehabilitation facility  -                 User Key  (r) = Recorded By, (t) = Taken By, (c) = Cosigned By      Initials Name Effective Dates     Ladan Cruz MS CCC-SLP 02/03/23 -      Latha Giles MS CCC-SLP 05/12/23 -                     EDUCATION  The patient has been educated in the following areas:   Dysphagia (Swallowing Impairment) Oral Care/Hydration Modified Diet Instruction.        SLP GOALS       Row Name 07/03/25 1640 07/01/25 1410          (LTG) Patient will demonstrate functional swallow for    Diet Texture (Demonstrate functional swallow) regular textures  - regular textures  -     Liquid viscosity (Demonstrate functional swallow) thin liquids  - thin liquids  -     Grace (Demonstrate functional swallow) with minimal cues (75-90% accuracy)  - with minimal cues (75-90% accuracy)  -     Time Frame (Demonstrate functional swallow) 1 week  - 1 week  -     Progress/Outcomes (Demonstrate functional swallow) good progress toward goal  - goal ongoing  -     Comment  (Demonstrate functional swallow) Provided education re: Hanks water protocol. Encouraged pt to discuss further w/ PCP.  -AC --        (STG) Patient will tolerate trials of    Consistencies Trialed (Tolerate trials) soft to chew (chopped) textures;nectar/ mildly thick liquids  -AC soft to chew (chopped) textures;nectar/ mildly thick liquids  -     Desired Outcome (Tolerate trials) without signs/symptoms of aspiration  -AC without signs/symptoms of aspiration  -     [place] (Tolerate trials) with minimal cues (75-90% accuracy)  -AC with minimal cues (75-90% accuracy)  -     Time Frame (Tolerate trials) 1 week  -AC 1 week  -     Progress/Outcomes (Tolerate trials) goal ongoing  - goal revised this date  -        (STG) Patient will tolerate therapeutic trials of    Consistencies Trialed (Tolerate therapeutic trials) -- regular textures;thin liquids  -     Desired Outcome (Tolerate therapeutic trials) -- without signs/symptoms of aspiration  -     Thida (Tolerate therapeutic trials) -- with 1:1 assist/ supervision  -     Time Frame (Tolerate therapeutic trials) -- 1 week  -     Progress/Outcomes (Tolerate therapeutic trials) -- goal no longer appropriate  -        ([facility]) Pharyngeal Strengthening Exercise Goal 1 (SLP)    Activity (Pharyngeal Strengthening Goal 1, SLP) -- increase timing  -     Increase Timing prepping - 3 second prep or suck swallow or 3-step swallow;Mendelsohn;super-supraglottic swallow  - prepping - 3 second prep or suck swallow or 3-step swallow;Mendelsohn;super-supraglottic swallow  -     [place]/Accuracy (Pharyngeal Strengthening Goal 1, SLP) with minimal cues (75-90% accuracy)  -AC with minimal cues (75-90% accuracy)  -     Time Frame (Pharyngeal Strengthening Goal 1, SLP) 1 week  -AC 1 week  -     Progress/Outcomes (Pharyngeal Strengthening Goal 1, SLP) goal ongoing  -AC goal ongoing  -        ([facility]) Swallow Management Recall Goal 1 (SLP)     Activity (Swallow Management Recall Goal 1, SLP) independent recall of;safe diet/liquid level;compensatory swallow strategies/techniques;other (see comments)  -AC independent recall of;safe diet/liquid level;compensatory swallow strategies/techniques;other (see comments)  -     [place]/Accuracy (Swallow Management Recall Goal 1, SLP) independently (over 90% accuracy)  -AC independently (over 90% accuracy)  -     Time Frame (Swallow Management Recall Goal 1, SLP) 1 week  -AC 1 week  -MH     Progress/Outcomes (Swallow Management Recall Goal 1, SLP) good progress toward goal  -AC goal no longer appropriate  -        (STG) Swallow Compensatory Strategies Goal 1 (SLP)    Activity (Swallow Compensatory Strategies/Techniques Goal 1, SLP) -- small straw sips  -     [place]/Accuracy (Swallow Compensatory Strategies/Techniques Goal 1, SLP) -- independently (over 90% accuracy)  -     Time Frame (Swallow Compensatory Strategies/Techniques Goal 1, SLP) -- 1 week  -     Progress/Outcomes (Swallow Compensatory Strategies/Techniques Goal 1, SLP) -- goal no longer appropriate  -               User Key  (r) = Recorded By, (t) = Taken By, (c) = Cosigned By      Initials Name Provider Type    Ladan Amezquita MS CCC-SLP Speech and Language Pathologist     Latha Giles MS CCC-SLP Speech and Language Pathologist                         Time Calculation:    Time Calculation- SLP       Row Name 07/03/25 1715             Time Calculation- SLP    SLP Start Time 1640  -AC      SLP Received On 07/03/25  -AC         Untimed Charges    [id number] Treatment Swallow Minutes 30  -AC         Total Minutes    Untimed Charges Total Minutes 30  -AC       Total Minutes 30  -AC                User Key  (r) = Recorded By, (t) = Taken By, (c) = Cosigned By      Initials Name Provider Type    Ladan Amezquita MS CCC-SLP Speech and Language Pathologist                    Therapy Charges for Today       Code Description  Service Date Service Provider Modifiers Qty    [id number]  ST TREATMENT SWALLOW 2 7/3/2025 Ladan Cruz, MS CCC-SLP GN 1                 Ladan Cruz, MS CCC-SLP  7/3/2025

## 2025-07-03 NOTE — PLAN OF CARE
Goal Outcome Evaluation:  Plan of Care Reviewed With: patient, spouse        Progress: no change       Anticipated Discharge Disposition (SLP): home with OP services             Treatment Assessment (SLP): continued, moderate, pharyngeal dysphagia, suspected (07/03/25 1640)  Treatment Assessment Comments (SLP): Pt discharging home. Provided extensive education re: FEES results, silent aspiration, risks of aspiration, recommendations, how to manage diet/liquid modifications @ home, and swallowing exericse/treatment program. Provided handouts and samples of thickener. Pt/spouse stated understanding and expressed appreciation. Pt would benefit from cont'd SLP services upon d/c and eventual outpatient study (MBS vs. FEES) to re-assess swallowing for possible liquid upgrade, when clinically appropriate. (07/03/25 1640)  Plan for Continued Treatment (SLP): continue treatment per plan of care (07/03/25 1640)

## 2025-07-07 ENCOUNTER — TRANSITIONAL CARE MANAGEMENT TELEPHONE ENCOUNTER (OUTPATIENT)
Dept: CALL CENTER | Facility: HOSPITAL | Age: 65
End: 2025-07-07
Payer: MEDICARE

## 2025-07-07 NOTE — OUTREACH NOTE
Call Center TCM Note      Flowsheet Row Responses   [facility] patient discharged from? [place]   Does the patient have one of the following disease processes/diagnoses(primary or secondary)? Other   TCM attempt successful? No   Unsuccessful attempts Attempt 1   Call Status Left message            Roxie Barnes Registered Nurse    7/7/2025, 09:04 EDT

## 2025-07-07 NOTE — OUTREACH NOTE
Call Center TCM Note      Flowsheet Row Responses   [facility] patient discharged from? [place]   Does the patient have one of the following disease processes/diagnoses(primary or secondary)? Other   TCM attempt successful? No   Unsuccessful attempts Attempt 2   Call Status Left message            Roxie Barnes Registered Nurse    7/7/2025, 15:10 EDT

## 2025-07-08 ENCOUNTER — TRANSITIONAL CARE MANAGEMENT TELEPHONE ENCOUNTER (OUTPATIENT)
Dept: CALL CENTER | Facility: HOSPITAL | Age: 65
End: 2025-07-08
Payer: MEDICARE

## 2025-07-08 NOTE — OUTREACH NOTE
Call Center TCM Note      Flowsheet Row Responses   [facility] patient discharged from? [place]   Does the patient have one of the following disease processes/diagnoses(primary or secondary)? Other   TCM attempt successful? Yes   Call start time 1400   Call end time 1405   Discharge diagnosis *Acute hypoxic respiratory failure (   Meds reviewed with patient/caregiver? Yes   Is the patient having any side effects they believe may be caused by any medication additions or changes? No   Does the patient have all medications ordered at discharge? Yes   Is the patient taking all medications as directed (includes completed medication regime)? Yes   Comments PCP  appt on 7/10 @ 2:30   Does the patient have an appointment with their PCP within 7-14 days of discharge? Yes   What DME was ordered? Oxygen   Has all DME been delivered? Yes   Psychosocial issues? No   Did the patient receive a copy of their discharge instructions? Yes   Nursing interventions Reviewed instructions with patient   What is the patient's perception of their health status since discharge? Improving   Is the patient/caregiver able to teach back signs and symptoms related to disease process for when to call PCP? Yes   Is the patient/caregiver able to teach back signs and symptoms related to disease process for when to call 911? Yes   Is the patient/caregiver able to teach back the hierarchy of who to call/visit for symptoms/problems? PCP, Specialist, Home health nurse, Urgent Care, ED, 911 Yes   If the patient is a current smoker, are they able to teach back resources for cessation? Not a smoker   Additional teach back comments States he is doing well.  Using 2L of oxygen with sats at 93%.  Has one antibiotic pill left.   TCM call completed? Yes   Wrap up additional comments Denies questions or needs at this time.   Call end time 1405            Charleen ROMERO - Licensed Nurse    7/8/2025, 14:07 EDT

## 2025-07-10 ENCOUNTER — LAB (OUTPATIENT)
Dept: LAB | Facility: HOSPITAL | Age: 65
End: 2025-07-10
Payer: MEDICARE

## 2025-07-10 ENCOUNTER — OFFICE VISIT (OUTPATIENT)
Dept: FAMILY MEDICINE CLINIC | Facility: CLINIC | Age: 65
End: 2025-07-10
Payer: MEDICARE

## 2025-07-10 VITALS
HEART RATE: 91 BPM | OXYGEN SATURATION: 95 % | TEMPERATURE: 98.9 F | BODY MASS INDEX: 24.39 KG/M2 | DIASTOLIC BLOOD PRESSURE: 82 MMHG | WEIGHT: 174.2 LBS | HEIGHT: 71 IN | SYSTOLIC BLOOD PRESSURE: 144 MMHG

## 2025-07-10 DIAGNOSIS — R93.89 ABNORMAL FINDINGS ON DIAGNOSTIC IMAGING OF OTHER SPECIFIED BODY STRUCTURES: ICD-10-CM

## 2025-07-10 DIAGNOSIS — J44.9 CHRONIC OBSTRUCTIVE PULMONARY DISEASE, UNSPECIFIED COPD TYPE: ICD-10-CM

## 2025-07-10 DIAGNOSIS — E87.1 HYPONATREMIA: ICD-10-CM

## 2025-07-10 DIAGNOSIS — Z12.11 SCREENING FOR COLON CANCER: Primary | ICD-10-CM

## 2025-07-10 PROBLEM — J40 BRONCHITIS: Status: RESOLVED | Noted: 2025-06-25 | Resolved: 2025-07-10

## 2025-07-10 LAB
ALBUMIN SERPL-MCNC: 3.7 G/DL (ref 3.5–5.2)
ALBUMIN/GLOB SERPL: 1.2 G/DL
ALP SERPL-CCNC: 114 U/L (ref 39–117)
ALT SERPL W P-5'-P-CCNC: 25 U/L (ref 1–41)
ANION GAP SERPL CALCULATED.3IONS-SCNC: 9.4 MMOL/L (ref 5–15)
AST SERPL-CCNC: 27 U/L (ref 1–40)
BASOPHILS # BLD AUTO: 0.08 10*3/MM3 (ref 0–0.2)
BASOPHILS NFR BLD AUTO: 0.7 % (ref 0–1.5)
BILIRUB SERPL-MCNC: 0.4 MG/DL (ref 0–1.2)
BUN SERPL-MCNC: 10 MG/DL (ref 8–23)
BUN/CREAT SERPL: 11.2 (ref 7–25)
CALCIUM SPEC-SCNC: 9.7 MG/DL (ref 8.6–10.5)
CHLORIDE SERPL-SCNC: 106 MMOL/L (ref 98–107)
CO2 SERPL-SCNC: 23.6 MMOL/L (ref 22–29)
CREAT SERPL-MCNC: 0.89 MG/DL (ref 0.76–1.27)
DEPRECATED RDW RBC AUTO: 44.1 FL (ref 37–54)
EGFRCR SERPLBLD CKD-EPI 2021: 95.1 ML/MIN/1.73
EOSINOPHIL # BLD AUTO: 0.25 10*3/MM3 (ref 0–0.4)
EOSINOPHIL NFR BLD AUTO: 2 % (ref 0.3–6.2)
ERYTHROCYTE [DISTWIDTH] IN BLOOD BY AUTOMATED COUNT: 11.8 % (ref 12.3–15.4)
GLOBULIN UR ELPH-MCNC: 3.2 GM/DL
GLUCOSE SERPL-MCNC: 81 MG/DL (ref 65–99)
HCT VFR BLD AUTO: 41.7 % (ref 37.5–51)
HGB BLD-MCNC: 13.6 G/DL (ref 13–17.7)
IMM GRANULOCYTES # BLD AUTO: 0.07 10*3/MM3 (ref 0–0.05)
IMM GRANULOCYTES NFR BLD AUTO: 0.6 % (ref 0–0.5)
LYMPHOCYTES # BLD AUTO: 2.1 10*3/MM3 (ref 0.7–3.1)
LYMPHOCYTES NFR BLD AUTO: 17.1 % (ref 19.6–45.3)
MCH RBC QN AUTO: 32.9 PG (ref 26.6–33)
MCHC RBC AUTO-ENTMCNC: 32.6 G/DL (ref 31.5–35.7)
MCV RBC AUTO: 101 FL (ref 79–97)
MONOCYTES # BLD AUTO: 1.23 10*3/MM3 (ref 0.1–0.9)
MONOCYTES NFR BLD AUTO: 10 % (ref 5–12)
NEUTROPHILS NFR BLD AUTO: 69.6 % (ref 42.7–76)
NEUTROPHILS NFR BLD AUTO: 8.56 10*3/MM3 (ref 1.7–7)
NRBC BLD AUTO-RTO: 0 /100 WBC (ref 0–0.2)
PLATELET # BLD AUTO: 423 10*3/MM3 (ref 140–450)
PMV BLD AUTO: 10 FL (ref 6–12)
POTASSIUM SERPL-SCNC: 4.9 MMOL/L (ref 3.5–5.2)
PROT SERPL-MCNC: 6.9 G/DL (ref 6–8.5)
RBC # BLD AUTO: 4.13 10*6/MM3 (ref 4.14–5.8)
SODIUM SERPL-SCNC: 139 MMOL/L (ref 136–145)
TSH SERPL DL<=0.05 MIU/L-ACNC: 0.66 UIU/ML (ref 0.27–4.2)
WBC NRBC COR # BLD AUTO: 12.29 10*3/MM3 (ref 3.4–10.8)

## 2025-07-10 PROCEDURE — 84443 ASSAY THYROID STIM HORMONE: CPT

## 2025-07-10 PROCEDURE — 36415 COLL VENOUS BLD VENIPUNCTURE: CPT

## 2025-07-10 PROCEDURE — 80053 COMPREHEN METABOLIC PANEL: CPT

## 2025-07-10 PROCEDURE — 85025 COMPLETE CBC W/AUTO DIFF WBC: CPT

## 2025-07-10 NOTE — PROGRESS NOTES
"Chief Complaint  Hospital Follow Up Visit (Per patient he was in [facility] due to having breathing problems x 2)    History of Present Illness            The patient was d/c on 7/3 after being treated for pneumonia , respiratory failure. He was diuresed while in hospital. Treated with antibiotic and steroid. Hospital course complicated by low sodium, improved. He was discharged on z holly. He has been tracking his o2 at home and he reports o2 has been always above 90 no lower. He reports feeling a whole lot better. He has stopped smoking and now using nicotine patch. He has been eating meatloaf for dinner . He says he hasn't gotten choked once. No belly pain. No other complaints.           The following portions of the patient's history were reviewed and updated as appropriate: allergies, current medications, past family history, past medical history, past social history, past surgical history, and problem list.    OBJECTIVE:  /82   Pulse 91   Temp 98.9 °F (37.2 °C) (Infrared)   Ht 180.3 cm (70.98\")   Wt 79 kg (174 lb 3.2 oz)   SpO2 95%   BMI 24.31 kg/m²       Physical Exam  Constitutional:       General: He is not in acute distress.     Appearance: Normal appearance.   HENT:      Head: Normocephalic and atraumatic.   Eyes:      Extraocular Movements: Extraocular movements intact.   Cardiovascular:      Rate and Rhythm: Normal rate and regular rhythm.      Heart sounds: No murmur heard.  Pulmonary:      Effort: Pulmonary effort is normal. No respiratory distress.      Breath sounds: Normal breath sounds. No stridor. No wheezing, rhonchi or rales.   Skin:     Findings: No rash.   Neurological:      General: No focal deficit present.      Mental Status: He is alert.   Psychiatric:         Mood and Affect: Mood normal.                    Assessment and Plan   Diagnoses and all orders for this visit:    1. Screening for colon cancer (Primary)  -     Ambulatory Referral For Screening " Colonoscopy    2. Hyponatremia  -     Comprehensive metabolic panel; Future    3. Chronic obstructive pulmonary disease, unspecified COPD type  -     Ambulatory Referral to Pulmonology  -     CBC & Differential; Future  -     Comprehensive metabolic panel; Future  -     TSH Rfx On Abnormal To Free T4; Future    4. Abnormal findings on diagnostic imaging of other specified body structures  -     TSH Rfx On Abnormal To Free T4; Future  -     XR Chest PA & Lateral (In Office)      Speech referral pending. He has been trying to comply with diet.   Check blood work and xray.   He will seek care if his symptoms return.   Continue advair. He finished antibiotic.   Congratulated him on smoking cessation.     Return in about 3 months (around 10/10/2025) for Medicare Wellness.       Concepción Quigley D.O.  [facility] [place]

## 2025-07-15 ENCOUNTER — READMISSION MANAGEMENT (OUTPATIENT)
Dept: CALL CENTER | Facility: HOSPITAL | Age: 65
End: 2025-07-15
Payer: MEDICARE

## 2025-07-15 NOTE — OUTREACH NOTE
Medical Week 2 Survey      Flowsheet Row Responses   [facility] patient discharged from? [place]   Does the patient have one of the following disease processes/diagnoses(primary or secondary)? Other   Week 2 attempt successful? No   Unsuccessful attempts Attempt 1   Revoke Sheila FIELDS - Registered Nurse

## 2025-07-27 DIAGNOSIS — I25.10 CORONARY ARTERY DISEASE INVOLVING NATIVE CORONARY ARTERY OF NATIVE HEART, UNSPECIFIED WHETHER ANGINA PRESENT: ICD-10-CM

## 2025-07-28 RX ORDER — ASPIRIN 81 MG/1
81 TABLET, COATED ORAL DAILY
Qty: 90 TABLET | Refills: 3 | Status: SHIPPED | OUTPATIENT
Start: 2025-07-28